# Patient Record
Sex: MALE | Race: WHITE | Employment: OTHER | URBAN - NONMETROPOLITAN AREA
[De-identification: names, ages, dates, MRNs, and addresses within clinical notes are randomized per-mention and may not be internally consistent; named-entity substitution may affect disease eponyms.]

---

## 2018-02-04 ENCOUNTER — HOSPITAL ENCOUNTER (EMERGENCY)
Age: 73
Discharge: HOME | End: 2018-02-04
Attending: EMERGENCY MEDICINE
Payer: COMMERCIAL

## 2018-02-04 VITALS
DIASTOLIC BLOOD PRESSURE: 86 MMHG | HEART RATE: 85 BPM | OXYGEN SATURATION: 95 % | TEMPERATURE: 97.9 F | SYSTOLIC BLOOD PRESSURE: 177 MMHG | RESPIRATION RATE: 18 BRPM

## 2018-02-04 DIAGNOSIS — G89.29 CHRONIC BILATERAL LOW BACK PAIN WITHOUT SCIATICA: Primary | ICD-10-CM

## 2018-02-04 DIAGNOSIS — M54.50 CHRONIC BILATERAL LOW BACK PAIN WITHOUT SCIATICA: Primary | ICD-10-CM

## 2018-02-04 PROCEDURE — 99282 EMERGENCY DEPT VISIT SF MDM: CPT

## 2018-02-04 RX ORDER — NAPROXEN 500 MG/1
500 TABLET ORAL 2 TIMES DAILY
Qty: 60 TABLET | Refills: 0 | Status: SHIPPED | OUTPATIENT
Start: 2018-02-04 | End: 2019-04-23

## 2018-02-04 ASSESSMENT — ENCOUNTER SYMPTOMS
SHORTNESS OF BREATH: 0
VOMITING: 0
ABDOMINAL PAIN: 0
FACIAL SWELLING: 0
BACK PAIN: 1
WHEEZING: 0
NAUSEA: 0
VOICE CHANGE: 0
DIARRHEA: 0
COUGH: 0

## 2018-02-04 ASSESSMENT — PAIN SCALES - GENERAL: PAINLEVEL_OUTOF10: 10

## 2018-02-04 ASSESSMENT — PAIN DESCRIPTION - LOCATION: LOCATION: BACK;LEG

## 2019-01-01 ENCOUNTER — OFFICE VISIT (OUTPATIENT)
Dept: UROLOGY | Age: 74
End: 2019-01-01
Payer: COMMERCIAL

## 2019-01-01 ENCOUNTER — HOSPITAL ENCOUNTER (OUTPATIENT)
Age: 74
Setting detail: OUTPATIENT SURGERY
Discharge: OTHER INSTITUTION | End: 2019-10-15
Attending: ANESTHESIOLOGY | Admitting: ANESTHESIOLOGY
Payer: COMMERCIAL

## 2019-01-01 ENCOUNTER — HOSPITAL ENCOUNTER (OUTPATIENT)
Age: 74
Setting detail: SPECIMEN
Discharge: HOME | End: 2019-10-25
Payer: COMMERCIAL

## 2019-01-01 ENCOUNTER — HOSPITAL ENCOUNTER (OUTPATIENT)
Dept: MRI IMAGING | Age: 74
Discharge: HOME | End: 2019-11-23
Payer: MEDICARE

## 2019-01-01 ENCOUNTER — HOSPITAL ENCOUNTER (OUTPATIENT)
Age: 74
Setting detail: SPECIMEN
Discharge: HOME | End: 2019-12-23
Payer: COMMERCIAL

## 2019-01-01 ENCOUNTER — HOSPITAL ENCOUNTER (OUTPATIENT)
Age: 74
Setting detail: SPECIMEN
Discharge: HOME | End: 2019-10-23
Payer: COMMERCIAL

## 2019-01-01 ENCOUNTER — APPOINTMENT (OUTPATIENT)
Dept: GENERAL RADIOLOGY | Age: 74
End: 2019-01-01
Attending: ANESTHESIOLOGY
Payer: COMMERCIAL

## 2019-01-01 VITALS
BODY MASS INDEX: 28.89 KG/M2 | SYSTOLIC BLOOD PRESSURE: 100 MMHG | WEIGHT: 218 LBS | HEIGHT: 73 IN | DIASTOLIC BLOOD PRESSURE: 52 MMHG

## 2019-01-01 VITALS
TEMPERATURE: 97 F | DIASTOLIC BLOOD PRESSURE: 78 MMHG | SYSTOLIC BLOOD PRESSURE: 102 MMHG | HEART RATE: 102 BPM | OXYGEN SATURATION: 98 %

## 2019-01-01 VITALS
HEIGHT: 73 IN | BODY MASS INDEX: 29.42 KG/M2 | SYSTOLIC BLOOD PRESSURE: 102 MMHG | WEIGHT: 222 LBS | DIASTOLIC BLOOD PRESSURE: 58 MMHG | TEMPERATURE: 98.6 F

## 2019-01-01 DIAGNOSIS — R29.898 BILATERAL LEG WEAKNESS: ICD-10-CM

## 2019-01-01 DIAGNOSIS — K59.00 CONSTIPATION, UNSPECIFIED CONSTIPATION TYPE: ICD-10-CM

## 2019-01-01 DIAGNOSIS — N40.1 BPH WITH OBSTRUCTION/LOWER URINARY TRACT SYMPTOMS: Primary | ICD-10-CM

## 2019-01-01 DIAGNOSIS — N31.9 NEUROGENIC BLADDER: ICD-10-CM

## 2019-01-01 DIAGNOSIS — N13.8 BPH WITH OBSTRUCTION/LOWER URINARY TRACT SYMPTOMS: Primary | ICD-10-CM

## 2019-01-01 DIAGNOSIS — R33.9 URINARY RETENTION: Primary | ICD-10-CM

## 2019-01-01 DIAGNOSIS — N13.8 BPH WITH OBSTRUCTION/LOWER URINARY TRACT SYMPTOMS: ICD-10-CM

## 2019-01-01 DIAGNOSIS — N40.1 BPH WITH OBSTRUCTION/LOWER URINARY TRACT SYMPTOMS: ICD-10-CM

## 2019-01-01 DIAGNOSIS — R41.0 CHRONIC CONFUSION: ICD-10-CM

## 2019-01-01 DIAGNOSIS — R33.9 URINARY RETENTION: ICD-10-CM

## 2019-01-01 LAB
ALBUMIN SERPL-MCNC: 2.6 G/DL (ref 3.5–5.2)
ALBUMIN/GLOBULIN RATIO: 0.7 (ref 1–2.5)
ALP BLD-CCNC: 71 U/L (ref 40–129)
ALT SERPL-CCNC: 5 U/L (ref 5–41)
ANION GAP SERPL CALCULATED.3IONS-SCNC: 11 MMOL/L (ref 9–17)
ANTI-NUCLEAR ANTIBODY (ANA): NEGATIVE
AST SERPL-CCNC: 11 U/L
BILIRUB SERPL-MCNC: 0.22 MG/DL (ref 0.3–1.2)
BUN BLDV-MCNC: 13 MG/DL (ref 8–23)
BUN/CREAT BLD: 13 (ref 9–20)
C DIFF AG + TOXIN: ABNORMAL
C DIFFICILE TOXINS, PCR: NORMAL
CALCIUM SERPL-MCNC: 8.7 MG/DL (ref 8.6–10.4)
CHLORIDE BLD-SCNC: 101 MMOL/L (ref 98–107)
CHOLESTEROL/HDL RATIO: 4
CHOLESTEROL: 127 MG/DL
CO2: 27 MMOL/L (ref 20–31)
CREAT SERPL-MCNC: 1.01 MG/DL (ref 0.7–1.2)
ESTIMATED AVERAGE GLUCOSE: 166 MG/DL
FOLATE: 10.2 NG/ML
GFR AFRICAN AMERICAN: >60 ML/MIN
GFR NON-AFRICAN AMERICAN: >60 ML/MIN
GFR SERPL CREATININE-BSD FRML MDRD: ABNORMAL ML/MIN/{1.73_M2}
GFR SERPL CREATININE-BSD FRML MDRD: ABNORMAL ML/MIN/{1.73_M2}
GLUCOSE BLD-MCNC: 81 MG/DL (ref 70–99)
HBA1C MFR BLD: 7.4 % (ref 4.8–5.9)
HCT VFR BLD CALC: 28.7 % (ref 40.7–50.3)
HDLC SERPL-MCNC: 32 MG/DL
HEMOGLOBIN: 9.1 G/DL (ref 13–17)
LDL CHOLESTEROL: 70 MG/DL (ref 0–130)
MCH RBC QN AUTO: 29.7 PG (ref 25.2–33.5)
MCHC RBC AUTO-ENTMCNC: 31.7 G/DL (ref 28.4–34.8)
MCV RBC AUTO: 93.8 FL (ref 82.6–102.9)
NRBC AUTOMATED: 0 PER 100 WBC
PDW BLD-RTO: 13.9 % (ref 11.8–14.4)
PLATELET # BLD: 415 K/UL (ref 138–453)
PMV BLD AUTO: 9.8 FL (ref 8.1–13.5)
POTASSIUM SERPL-SCNC: 3.6 MMOL/L (ref 3.7–5.3)
PROSTATE SPECIFIC ANTIGEN: 4.22 UG/L
RBC # BLD: 3.06 M/UL (ref 4.21–5.77)
SEDIMENTATION RATE, ERYTHROCYTE: 98 MM (ref 0–20)
SODIUM BLD-SCNC: 139 MMOL/L (ref 135–144)
SPECIMEN DESCRIPTION: ABNORMAL
SPECIMEN DESCRIPTION: NORMAL
TOTAL CK: 45 U/L (ref 39–308)
TOTAL PROTEIN: 6.1 G/DL (ref 6.4–8.3)
TRIGL SERPL-MCNC: 124 MG/DL
TSH SERPL DL<=0.05 MIU/L-ACNC: 1.45 MIU/L (ref 0.3–5)
VITAMIN B-12: 699 PG/ML (ref 232–1245)
VLDLC SERPL CALC-MCNC: ABNORMAL MG/DL (ref 1–30)
WBC # BLD: 6.2 K/UL (ref 3.5–11.3)

## 2019-01-01 PROCEDURE — 36415 COLL VENOUS BLD VENIPUNCTURE: CPT

## 2019-01-01 PROCEDURE — 87493 C DIFF AMPLIFIED PROBE: CPT

## 2019-01-01 PROCEDURE — 3600000002 HC SURGERY LEVEL 2 BASE: Performed by: ANESTHESIOLOGY

## 2019-01-01 PROCEDURE — 82607 VITAMIN B-12: CPT

## 2019-01-01 PROCEDURE — 2709999900 HC NON-CHARGEABLE SUPPLY: Performed by: ANESTHESIOLOGY

## 2019-01-01 PROCEDURE — 3600000012 HC SURGERY LEVEL 2 ADDTL 15MIN: Performed by: ANESTHESIOLOGY

## 2019-01-01 PROCEDURE — G0103 PSA SCREENING: HCPCS

## 2019-01-01 PROCEDURE — 83036 HEMOGLOBIN GLYCOSYLATED A1C: CPT

## 2019-01-01 PROCEDURE — 86038 ANTINUCLEAR ANTIBODIES: CPT

## 2019-01-01 PROCEDURE — 51798 US URINE CAPACITY MEASURE: CPT | Performed by: NURSE PRACTITIONER

## 2019-01-01 PROCEDURE — 85027 COMPLETE CBC AUTOMATED: CPT

## 2019-01-01 PROCEDURE — 6360000002 HC RX W HCPCS: Performed by: ANESTHESIOLOGY

## 2019-01-01 PROCEDURE — 82746 ASSAY OF FOLIC ACID SERUM: CPT

## 2019-01-01 PROCEDURE — 80053 COMPREHEN METABOLIC PANEL: CPT

## 2019-01-01 PROCEDURE — 2500000003 HC RX 250 WO HCPCS: Performed by: ANESTHESIOLOGY

## 2019-01-01 PROCEDURE — 3209999900 FLUORO FOR SURGICAL PROCEDURES

## 2019-01-01 PROCEDURE — 87449 NOS EACH ORGANISM AG IA: CPT

## 2019-01-01 PROCEDURE — 84443 ASSAY THYROID STIM HORMONE: CPT

## 2019-01-01 PROCEDURE — P9603 ONE-WAY ALLOW PRORATED MILES: HCPCS

## 2019-01-01 PROCEDURE — 72146 MRI CHEST SPINE W/O DYE: CPT

## 2019-01-01 PROCEDURE — 80061 LIPID PANEL: CPT

## 2019-01-01 PROCEDURE — 99308 SBSQ NF CARE LOW MDM 20: CPT | Performed by: NURSE PRACTITIONER

## 2019-01-01 PROCEDURE — 72141 MRI NECK SPINE W/O DYE: CPT

## 2019-01-01 PROCEDURE — P9604 ONE-WAY ALLOW PRORATED TRIP: HCPCS

## 2019-01-01 PROCEDURE — 87324 CLOSTRIDIUM AG IA: CPT

## 2019-01-01 PROCEDURE — 99204 OFFICE O/P NEW MOD 45 MIN: CPT | Performed by: NURSE PRACTITIONER

## 2019-01-01 PROCEDURE — 85651 RBC SED RATE NONAUTOMATED: CPT

## 2019-01-01 PROCEDURE — 82550 ASSAY OF CK (CPK): CPT

## 2019-01-01 RX ORDER — OXYCODONE HYDROCHLORIDE AND ACETAMINOPHEN 5; 325 MG/1; MG/1
1 TABLET ORAL
COMMUNITY
End: 2020-01-01 | Stop reason: CLARIF

## 2019-01-01 RX ORDER — BUPIVACAINE HYDROCHLORIDE 5 MG/ML
INJECTION, SOLUTION EPIDURAL; INTRACAUDAL PRN
Status: DISCONTINUED | OUTPATIENT
Start: 2019-01-01 | End: 2019-01-01 | Stop reason: ALTCHOICE

## 2019-01-01 RX ORDER — LISINOPRIL 20 MG/1
TABLET ORAL
COMMUNITY
End: 2019-01-01

## 2019-01-01 RX ORDER — SODIUM CHLORIDE 0.9 % (FLUSH) 0.9 %
10 SYRINGE (ML) INJECTION EVERY 12 HOURS SCHEDULED
Status: DISCONTINUED | OUTPATIENT
Start: 2019-01-01 | End: 2019-01-01 | Stop reason: HOSPADM

## 2019-01-01 RX ORDER — VANCOMYCIN HYDROCHLORIDE 5 G/100ML
5 INJECTION, POWDER, LYOPHILIZED, FOR SOLUTION INTRAVENOUS
COMMUNITY
Start: 2019-10-04 | End: 2020-01-01 | Stop reason: ALTCHOICE

## 2019-01-01 RX ORDER — LIDOCAINE HYDROCHLORIDE 10 MG/ML
INJECTION, SOLUTION EPIDURAL; INFILTRATION; INTRACAUDAL; PERINEURAL PRN
Status: DISCONTINUED | OUTPATIENT
Start: 2019-01-01 | End: 2019-01-01 | Stop reason: ALTCHOICE

## 2019-01-01 RX ORDER — ALBUTEROL SULFATE 90 UG/1
AEROSOL, METERED RESPIRATORY (INHALATION)
COMMUNITY
Start: 2019-04-24 | End: 2020-01-01 | Stop reason: CLARIF

## 2019-01-01 RX ORDER — POLYETHYLENE GLYCOL 3350 17 G/17G
17 POWDER, FOR SOLUTION ORAL
COMMUNITY
Start: 2019-09-07

## 2019-01-01 RX ORDER — SODIUM CHLORIDE 0.9 % (FLUSH) 0.9 %
10 SYRINGE (ML) INJECTION PRN
Status: DISCONTINUED | OUTPATIENT
Start: 2019-01-01 | End: 2019-01-01 | Stop reason: HOSPADM

## 2019-01-01 RX ORDER — ATORVASTATIN CALCIUM 20 MG/1
20 TABLET, FILM COATED ORAL
COMMUNITY
Start: 2019-04-25 | End: 2020-01-01 | Stop reason: CLARIF

## 2019-01-01 RX ORDER — LORAZEPAM 1 MG/1
1 TABLET ORAL EVERY 6 HOURS PRN
COMMUNITY
End: 2020-01-01 | Stop reason: CLARIF

## 2019-01-01 RX ORDER — SERTRALINE HYDROCHLORIDE 100 MG/1
200 TABLET, FILM COATED ORAL DAILY
COMMUNITY
Start: 2019-10-03

## 2019-01-01 RX ORDER — MAGNESIUM SULFATE 1 G/100ML
1 INJECTION INTRAVENOUS
COMMUNITY
Start: 2019-09-06 | End: 2020-01-01 | Stop reason: CLARIF

## 2019-01-01 RX ORDER — MELOXICAM 15 MG/1
TABLET ORAL
Refills: 1 | COMMUNITY
Start: 2019-08-23 | End: 2020-01-01 | Stop reason: CLARIF

## 2019-01-01 RX ORDER — POTASSIUM CHLORIDE 750 MG/1
40 CAPSULE, EXTENDED RELEASE ORAL
COMMUNITY
Start: 2019-09-06 | End: 2020-01-01 | Stop reason: CLARIF

## 2019-01-01 RX ORDER — SODIUM CHLORIDE, SODIUM LACTATE, POTASSIUM CHLORIDE, CALCIUM CHLORIDE 600; 310; 30; 20 MG/100ML; MG/100ML; MG/100ML; MG/100ML
INJECTION, SOLUTION INTRAVENOUS CONTINUOUS
Status: DISCONTINUED | OUTPATIENT
Start: 2019-01-01 | End: 2019-01-01 | Stop reason: HOSPADM

## 2019-01-01 RX ORDER — SODIUM PHOSPHATE, DIBASIC AND SODIUM PHOSPHATE, MONOBASIC 7; 19 G/133ML; G/133ML
1 ENEMA RECTAL
COMMUNITY

## 2019-01-01 RX ORDER — NICOTINE POLACRILEX 4 MG
15 LOZENGE BUCCAL PRN
COMMUNITY
Start: 2019-09-04

## 2019-01-01 RX ORDER — LACTULOSE 10 G/15ML
30 SOLUTION ORAL
COMMUNITY
Start: 2019-09-07 | End: 2020-01-01 | Stop reason: CLARIF

## 2019-01-01 RX ORDER — NICOTINE 21 MG/24HR
1 PATCH, TRANSDERMAL 24 HOURS TRANSDERMAL
COMMUNITY
Start: 2019-09-06 | End: 2020-01-01 | Stop reason: CLARIF

## 2019-01-01 RX ORDER — ONDANSETRON 2 MG/ML
4 INJECTION INTRAMUSCULAR; INTRAVENOUS
COMMUNITY
Start: 2019-09-04 | End: 2020-01-01 | Stop reason: CLARIF

## 2019-01-01 RX ORDER — INSULIN GLARGINE 100 [IU]/ML
INJECTION, SOLUTION SUBCUTANEOUS
COMMUNITY
Start: 2019-04-25 | End: 2019-01-01

## 2019-01-01 RX ORDER — METHYLPREDNISOLONE ACETATE 40 MG/ML
INJECTION, SUSPENSION INTRA-ARTICULAR; INTRALESIONAL; INTRAMUSCULAR; SOFT TISSUE PRN
Status: DISCONTINUED | OUTPATIENT
Start: 2019-01-01 | End: 2019-01-01 | Stop reason: ALTCHOICE

## 2019-01-01 ASSESSMENT — ENCOUNTER SYMPTOMS
VOMITING: 0
COUGH: 0
SHORTNESS OF BREATH: 0
ABDOMINAL PAIN: 0
EYE PAIN: 0
NAUSEA: 0
CONSTIPATION: 1
EYE REDNESS: 0
NAUSEA: 0
COLOR CHANGE: 0
EYE REDNESS: 0
BACK PAIN: 0
WHEEZING: 0
SHORTNESS OF BREATH: 0
VOMITING: 0
COLOR CHANGE: 0
EYE PAIN: 0
WHEEZING: 0
ABDOMINAL PAIN: 0
BACK PAIN: 0
CONSTIPATION: 1
COUGH: 0

## 2019-01-01 ASSESSMENT — PAIN - FUNCTIONAL ASSESSMENT: PAIN_FUNCTIONAL_ASSESSMENT: 0-10

## 2019-04-23 ENCOUNTER — HOSPITAL ENCOUNTER (EMERGENCY)
Age: 74
Discharge: ANOTHER HOSPITAL | End: 2019-04-24
Attending: EMERGENCY MEDICINE
Payer: COMMERCIAL

## 2019-04-23 DIAGNOSIS — K92.2 GASTROINTESTINAL HEMORRHAGE, UNSPECIFIED GASTROINTESTINAL HEMORRHAGE TYPE: ICD-10-CM

## 2019-04-23 DIAGNOSIS — R73.9 HYPERGLYCEMIA: ICD-10-CM

## 2019-04-23 DIAGNOSIS — I16.0 HYPERTENSIVE URGENCY: Primary | ICD-10-CM

## 2019-04-23 LAB
-: ABNORMAL
ALLEN TEST: ABNORMAL
AMORPHOUS: ABNORMAL
ANION GAP SERPL CALCULATED.3IONS-SCNC: 12 MMOL/L (ref 9–17)
BACTERIA: ABNORMAL
BETA-HYDROXYBUTYRATE: 0.15 MMOL/L (ref 0.02–0.27)
BILIRUBIN URINE: NEGATIVE
BUN BLDV-MCNC: 37 MG/DL (ref 8–23)
BUN/CREAT BLD: 30 (ref 9–20)
CALCIUM SERPL-MCNC: 8.5 MG/DL (ref 8.6–10.4)
CARBOXYHEMOGLOBIN: ABNORMAL % (ref 0–5)
CASTS UA: ABNORMAL /LPF
CHLORIDE BLD-SCNC: 96 MMOL/L (ref 98–107)
CHP ED QC CHECK: YES
CO2: 23 MMOL/L (ref 20–31)
COLOR: YELLOW
COMMENT UA: ABNORMAL
CREAT SERPL-MCNC: 1.22 MG/DL (ref 0.7–1.2)
CRYSTALS, UA: ABNORMAL /HPF
EPITHELIAL CELLS UA: ABNORMAL /HPF (ref 0–5)
FIO2: ABNORMAL
GFR AFRICAN AMERICAN: >60 ML/MIN
GFR NON-AFRICAN AMERICAN: 59 ML/MIN
GFR SERPL CREATININE-BSD FRML MDRD: ABNORMAL ML/MIN/{1.73_M2}
GFR SERPL CREATININE-BSD FRML MDRD: ABNORMAL ML/MIN/{1.73_M2}
GLUCOSE BLD-MCNC: 349 MG/DL (ref 74–100)
GLUCOSE BLD-MCNC: 379 MG/DL
GLUCOSE BLD-MCNC: 385 MG/DL (ref 70–99)
GLUCOSE URINE: ABNORMAL
HCO3 VENOUS: 22.6 MMOL/L (ref 24–30)
HCT VFR BLD CALC: 34.1 % (ref 40.7–50.3)
HEMOGLOBIN: 11.5 G/DL (ref 13–17)
KETONES, URINE: NEGATIVE
LACTIC ACID, WHOLE BLOOD: ABNORMAL MMOL/L (ref 0.7–2.1)
LACTIC ACID: 2.5 MMOL/L (ref 0.5–2.2)
LEUKOCYTE ESTERASE, URINE: NEGATIVE
MAGNESIUM: 1.9 MG/DL (ref 1.6–2.6)
MCH RBC QN AUTO: 31.5 PG (ref 25.2–33.5)
MCHC RBC AUTO-ENTMCNC: 33.7 G/DL (ref 28.4–34.8)
MCV RBC AUTO: 93.4 FL (ref 82.6–102.9)
METHEMOGLOBIN: ABNORMAL % (ref 0–1.9)
MODE: ABNORMAL
MUCUS: ABNORMAL
NEGATIVE BASE EXCESS, VEN: 2 MMOL/L (ref 0–2)
NITRITE, URINE: NEGATIVE
NOTIFICATION TIME: ABNORMAL
NOTIFICATION: ABNORMAL
NRBC AUTOMATED: 0 PER 100 WBC
O2 DEVICE/FLOW/%: ABNORMAL
O2 SAT, VEN: 81.9 % (ref 60–85)
OTHER OBSERVATIONS UA: ABNORMAL
OXYHEMOGLOBIN: ABNORMAL % (ref 95–98)
PATIENT TEMP: ABNORMAL
PCO2, VEN, TEMP ADJ: ABNORMAL MMHG (ref 39–55)
PCO2, VEN: 38.3 (ref 39–55)
PDW BLD-RTO: 13.1 % (ref 11.8–14.4)
PEEP/CPAP: ABNORMAL
PH UA: 5.5 (ref 5–9)
PH VENOUS: 7.39 (ref 7.32–7.42)
PH, VEN, TEMP ADJ: ABNORMAL (ref 7.32–7.42)
PLATELET # BLD: 218 K/UL (ref 138–453)
PMV BLD AUTO: 10.1 FL (ref 8.1–13.5)
PO2, VEN, TEMP ADJ: ABNORMAL MMHG (ref 30–50)
PO2, VEN: 46.1 (ref 30–50)
POSITIVE BASE EXCESS, VEN: ABNORMAL MMOL/L (ref 0–2)
POTASSIUM SERPL-SCNC: 4.8 MMOL/L (ref 3.7–5.3)
PROTEIN UA: ABNORMAL
PSV: ABNORMAL
PT. POSITION: ABNORMAL
RBC # BLD: 3.65 M/UL (ref 4.21–5.77)
RBC UA: ABNORMAL /HPF (ref 0–2)
RENAL EPITHELIAL, UA: ABNORMAL /HPF
RESPIRATORY RATE: ABNORMAL
SAMPLE SITE: ABNORMAL
SET RATE: ABNORMAL
SODIUM BLD-SCNC: 131 MMOL/L (ref 135–144)
SPECIFIC GRAVITY UA: 1.02 (ref 1.01–1.02)
TEXT FOR RESPIRATORY: ABNORMAL
TOTAL HB: ABNORMAL G/DL (ref 12–16)
TOTAL RATE: ABNORMAL
TRICHOMONAS: ABNORMAL
TURBIDITY: CLEAR
URINE HGB: ABNORMAL
UROBILINOGEN, URINE: NORMAL
VT: ABNORMAL
WBC # BLD: 7.9 K/UL (ref 3.5–11.3)
WBC UA: ABNORMAL /HPF (ref 0–5)
YEAST: ABNORMAL

## 2019-04-23 PROCEDURE — 96375 TX/PRO/DX INJ NEW DRUG ADDON: CPT

## 2019-04-23 PROCEDURE — 6360000002 HC RX W HCPCS: Performed by: EMERGENCY MEDICINE

## 2019-04-23 PROCEDURE — 99285 EMERGENCY DEPT VISIT HI MDM: CPT

## 2019-04-23 PROCEDURE — 82947 ASSAY GLUCOSE BLOOD QUANT: CPT

## 2019-04-23 PROCEDURE — 96361 HYDRATE IV INFUSION ADD-ON: CPT

## 2019-04-23 PROCEDURE — 36415 COLL VENOUS BLD VENIPUNCTURE: CPT

## 2019-04-23 PROCEDURE — 80048 BASIC METABOLIC PNL TOTAL CA: CPT

## 2019-04-23 PROCEDURE — 85027 COMPLETE CBC AUTOMATED: CPT

## 2019-04-23 PROCEDURE — 83735 ASSAY OF MAGNESIUM: CPT

## 2019-04-23 PROCEDURE — 81001 URINALYSIS AUTO W/SCOPE: CPT

## 2019-04-23 PROCEDURE — 2580000003 HC RX 258: Performed by: PHYSICIAN ASSISTANT

## 2019-04-23 PROCEDURE — 82010 KETONE BODYS QUAN: CPT

## 2019-04-23 PROCEDURE — 96374 THER/PROPH/DIAG INJ IV PUSH: CPT

## 2019-04-23 PROCEDURE — 83605 ASSAY OF LACTIC ACID: CPT

## 2019-04-23 PROCEDURE — 82805 BLOOD GASES W/O2 SATURATION: CPT

## 2019-04-23 PROCEDURE — 2500000003 HC RX 250 WO HCPCS: Performed by: PHYSICIAN ASSISTANT

## 2019-04-23 RX ORDER — LABETALOL HYDROCHLORIDE 5 MG/ML
10 INJECTION, SOLUTION INTRAVENOUS ONCE
Status: COMPLETED | OUTPATIENT
Start: 2019-04-23 | End: 2019-04-23

## 2019-04-23 RX ORDER — 0.9 % SODIUM CHLORIDE 0.9 %
1000 INTRAVENOUS SOLUTION INTRAVENOUS ONCE
Status: COMPLETED | OUTPATIENT
Start: 2019-04-23 | End: 2019-04-24

## 2019-04-23 RX ORDER — HYDRALAZINE HYDROCHLORIDE 20 MG/ML
5 INJECTION INTRAMUSCULAR; INTRAVENOUS EVERY 6 HOURS PRN
Status: DISCONTINUED | OUTPATIENT
Start: 2019-04-23 | End: 2019-04-24 | Stop reason: HOSPADM

## 2019-04-23 RX ADMIN — SODIUM CHLORIDE 1000 ML: 9 INJECTION, SOLUTION INTRAVENOUS at 21:52

## 2019-04-23 RX ADMIN — HYDRALAZINE HYDROCHLORIDE 5 MG: 20 INJECTION INTRAMUSCULAR; INTRAVENOUS at 23:38

## 2019-04-23 RX ADMIN — LABETALOL HYDROCHLORIDE 10 MG: 5 INJECTION, SOLUTION INTRAVENOUS at 23:04

## 2019-04-23 ASSESSMENT — ENCOUNTER SYMPTOMS
SORE THROAT: 0
DIARRHEA: 0
BLOOD IN STOOL: 0
CHEST TIGHTNESS: 0
RHINORRHEA: 0
EYE DISCHARGE: 0
BACK PAIN: 0
VOMITING: 0
EYE REDNESS: 0
SHORTNESS OF BREATH: 0
ABDOMINAL PAIN: 0
COUGH: 0
CONSTIPATION: 0
WHEEZING: 0
NAUSEA: 0

## 2019-04-24 VITALS
DIASTOLIC BLOOD PRESSURE: 82 MMHG | OXYGEN SATURATION: 96 % | HEART RATE: 90 BPM | TEMPERATURE: 98.6 F | RESPIRATION RATE: 15 BRPM | SYSTOLIC BLOOD PRESSURE: 194 MMHG

## 2019-04-24 PROCEDURE — 6370000000 HC RX 637 (ALT 250 FOR IP): Performed by: EMERGENCY MEDICINE

## 2019-04-24 RX ORDER — ACETAMINOPHEN 500 MG
1000 TABLET ORAL ONCE
Status: COMPLETED | OUTPATIENT
Start: 2019-04-24 | End: 2019-04-24

## 2019-04-24 RX ADMIN — ACETAMINOPHEN 1000 MG: 500 TABLET, FILM COATED ORAL at 00:22

## 2019-04-24 ASSESSMENT — PAIN SCALES - GENERAL: PAINLEVEL_OUTOF10: 9

## 2019-04-24 NOTE — ED NOTES
Called access, spoke to [place]. Need hospitalist at [facility] for transfer.  Waiting for call back     Layla Sanchez  04/23/19 4227

## 2019-04-24 NOTE — ED PROVIDER NOTES
[facility] ED  eMERGENCY dEPARTMENT eNCOUnter      Pt Name: Jonn Villarreal  MRN: [id number]  [place] 1945  Date of evaluation: 4/23/2019  Provider: Jacob Quijano [facility]     Chief Complaint   Patient presents with    Hyperglycemia     patient had episodes of hypoglycemia over the weekend since then his insulin was stopped except hs lantus shot.  today blood sugar has been elevated         HISTORY OF PRESENT ILLNESS   (Location/Symptom, Timing/Onset, Context/Setting,Quality, Duration, Modifying Factors, Severity)  Note limiting factors. Jonn Villarreal is a80 y.o. male who presents to the emergency department with spell secondary to hyperglycemia noted this evening. Family reports that patient has always had hyperglycemia and then an insulin-dependent diabetic. Reports that recently he's been having episodes of forgetfulness and so sometimes he takes his insulin and sometimes he does not. Reports of the weakening got as high as 600 and so his doctor was able to get it down however over the weekend had episodes of hypoglycemia so his doctor stopped his insulin. Family reports that today it was noted to be back up higher again so they came to the ER. They deny any recent fever chills denies any recent infection. Denies any new medication. They does report that they're told to come to the ER because they've had trouble managing the blood sugar. He denies any chest pain refers of breath denies any headache or dizziness. No other complaints at this time. HPI    Nursing Notes werereviewed. REVIEW OF SYSTEMS    (2-9 systems for level 4, 10 or more for level 5)     Review of Systems   Constitutional: Negative for chills, diaphoresis and fever. HENT: Negative for congestion, ear pain, rhinorrhea and sore throat. Eyes: Negative for discharge, redness and visual disturbance. Respiratory: Negative for cough, chest tightness, shortness of breath and wheezing. Cardiovascular: Negative for chest pain and palpitations. Gastrointestinal: Negative for abdominal pain, blood in stool, constipation, diarrhea, nausea and vomiting. Endocrine: Negative for polydipsia, polyphagia and polyuria. Genitourinary: Negative for decreased urine volume, difficulty urinating, dysuria, frequency and hematuria. Musculoskeletal: Negative for arthralgias, back pain and myalgias. Skin: Negative for pallor and rash. Allergic/Immunologic: Negative for food allergies and immunocompromised state. Neurological: Negative for dizziness, syncope, weakness and light-headedness. Hematological: Negative for adenopathy. Does not bruise/bleed easily. Psychiatric/Behavioral: Negative for behavioral problems and suicidal ideas. The patient is not nervous/anxious. Except as noted above the remainder of the review of systems was reviewed and negative. PAST MEDICAL HISTORY     Past Medical History:   Diagnosis Date    Diabetes mellitus ([facility] [address]     Hypertension          SURGICALHISTORY     History reviewed. No pertinent surgical history. CURRENT MEDICATIONS       Previous Medications    INSULIN ASPART PROTAMINE-INSULIN ASPART (NOVOLOG MIX 70/30 FLEXPEN) (70-30) 100 UNIT/ML INJECTION    Inject into the skin three times daily     INSULIN GLARGINE (LANTUS) 100 UNIT/ML INJECTION VIAL    Inject 60 Units into the skin nightly     LISINOPRIL (PRINIVIL;ZESTRIL) 20 MG TABLET    Take by mouth daily     METFORMIN (GLUCOPHAGE) 500 MG TABLET    Take 500 mg by mouth 2 times daily (with meals)    TRAMADOL (ULTRAM) 50 MG TABLET    Take 50 mg by mouth every 6 hours as needed for Pain    VERAPAMIL (CALAN) 40 MG TABLET    Take 40 mg by mouth 3 times daily Patient only taking at night       ALLERGIES     Patient has no known allergies. FAMILY HISTORY     History reviewed. No pertinent family history.        SOCIAL HISTORY       Social History     Socioeconomic History    Marital status:  Spouse name: None    Number of children: None    Years of education: None    Highest education level: None   Occupational History    None   Social Needs    Financial resource strain: None    Food insecurity:     Worry: None     Inability: None    Transportation needs:     Medical: None     Non-medical: None   Tobacco Use    Smoking status: Never Smoker    Smokeless tobacco: Never Used   Substance and Sexual Activity    Alcohol use: No    Drug use: No    Sexual activity: None   Lifestyle    Physical activity:     Days per week: None     Minutes per session: None    Stress: None   Relationships    Social connections:     Talks on phone: None     Gets together: None     Attends [employer] service: None     Active member of club or organization: None     Attends meetings of clubs or organizations: None     Relationship status: None    Intimate partner violence:     Fear of current or ex partner: None     Emotionally abused: None     Physically abused: None     Forced sexual activity: None   Other Topics Concern    None   Social History Narrative    None       SCREENINGS      @FLOW([id number])@      PHYSICAL EXAM    (up to 7 for level 4, 8 or more for level 5)     ED Triage Vitals   BP Temp Temp Source Pulse Resp SpO2 Height Weight   04/23/19 2146 04/23/19 2013 04/23/19 2013 04/23/19 2013 04/23/19 2013 04/23/19 2013 -- --   (!) 210/103 98.6 °F (37 °C) Tympanic 92 18 98 %         Physical Exam   Constitutional: He is oriented to person, place, and time. He appears well-developed and well-nourished. No distress. HENT:   Head: Normocephalic and atraumatic. Right Ear: External ear normal.   Left Ear: External ear normal.   Mouth/Throat: Oropharynx is clear and moist. No oropharyngeal exudate. Eyes: Pupils are equal, round, and reactive to light. Conjunctivae and EOM are normal. Right eye exhibits no discharge. Left eye exhibits no discharge. No scleral icterus. Neck: Normal range of motion.  Neck components within normal limits   BLOOD GAS, VENOUS - Abnormal; Notable for the following components:    pCO2, Tony 38.3 (*)     HCO3, Venous 22.6 (*)     All other components within normal limits   URINE RT REFLEX TO CULTURE - Abnormal; Notable for the following components:    Glucose, Ur 3+ (*)     Specific Gravity, UA 1.025 (*)     Urine Hgb 1+ (*)     Protein, UA 3+ (*)     All other components within normal limits   LACTIC ACID, PLASMA - Abnormal; Notable for the following components:    Lactic Acid 2.5 (*)     All other components within normal limits   GLUCOSE, WHOLE BLOOD - Abnormal; Notable for the following components:    POC Glucose 349 (*)     All other components within normal limits   MICROSCOPIC URINALYSIS - Abnormal; Notable for the following components:    Bacteria, UA 1+ (*)     Mucus, UA TRACE (*)     Amorphous, UA TRACE (*)     All other components within normal limits   POCT GLUCOSE - Normal   BETA-HYDROXYBUTYRATE   MAGNESIUM       All other labs were within normal range or not returned as of this dictation. EMERGENCY DEPARTMENT COURSE and DIFFERENTIAL DIAGNOSIS/MDM:   Vitals:    Vitals:    04/23/19 2152 04/23/19 2201 04/23/19 2218 04/23/19 2232   BP: (!) 202/98 (!) 225/94 (!) 200/92 (!) 223/102   Pulse:       Resp:       Temp:       TempSrc:       SpO2: 95% 97% 99% 99%         MDM  65-year-old male with history of diabetes is insulin-dependent who presents secondary to trouble with his blood sugar over the weekend. Here in the ER resident 300s. Given their he had episodes in the 600s just 48 hours ago I am concerned that he could have others have elected to light and bounces. He is noted to be hypertensive however he is asymptomatic from this. We'll gently give him some IV fluids. On reevaluation patient's resting currently is in no distress. Patient is noted to have a normal pH. However his lactate is slightly bun. Beta hydroxy. Is negative.   At this point he still is hypertensive I am going to give him some labetalol. 4/23/19  10:59 PM  Transferring care of this patient to my attending physician, , at this time for review of workup final disposition and treatment and care of this patient.      Procedures      (Please note that portions of this note were completed with a voice recognition program.  Efforts were made to edit the dictations but occasionally words are mis-transcribed.)           Cate Gaston PA-C  04/23/19 [id number]

## 2019-04-24 NOTE — ED PROVIDER NOTES
Sign out receiving  ADDENDUM  This patient was signed out to me at shift change  The pending studies are: reassesment  I have reviewed the ED record and personally evaluated this patient  BP (!) 223/102   Pulse 92   Temp 98.6 °F (37 °C) (Tympanic)   Resp 18   SpO2 99%   ED course: pt noted to have occult blood positive stool. He also has very high blood pressure and difficult to control sugars he will be xfered to [address]   1. Hypertensive urgency    2. Gastrointestinal hemorrhage, unspecified gastrointestinal hemorrhage type    3.  Hyperglycemia                    Gabino Georges MD  04/23/19 [id number]

## 2019-08-29 ENCOUNTER — HOSPITAL ENCOUNTER (EMERGENCY)
Age: 74
Discharge: HOME | End: 2019-08-29
Attending: EMERGENCY MEDICINE
Payer: COMMERCIAL

## 2019-08-29 ENCOUNTER — APPOINTMENT (OUTPATIENT)
Dept: CT IMAGING | Age: 74
End: 2019-08-29
Payer: COMMERCIAL

## 2019-08-29 VITALS
HEART RATE: 86 BPM | HEIGHT: 73 IN | SYSTOLIC BLOOD PRESSURE: 199 MMHG | RESPIRATION RATE: 18 BRPM | TEMPERATURE: 98.6 F | OXYGEN SATURATION: 99 % | DIASTOLIC BLOOD PRESSURE: 94 MMHG

## 2019-08-29 DIAGNOSIS — M54.50 ACUTE BILATERAL LOW BACK PAIN WITHOUT SCIATICA: Primary | ICD-10-CM

## 2019-08-29 LAB
-: ABNORMAL
ABSOLUTE EOS #: 0.03 K/UL (ref 0–0.44)
ABSOLUTE IMMATURE GRANULOCYTE: 0.03 K/UL (ref 0–0.3)
ABSOLUTE LYMPH #: 1.02 K/UL (ref 1.1–3.7)
ABSOLUTE MONO #: 0.66 K/UL (ref 0.1–1.2)
ALBUMIN SERPL-MCNC: 3.2 G/DL (ref 3.5–5.2)
ALBUMIN/GLOBULIN RATIO: 1.1 (ref 1–2.5)
ALP BLD-CCNC: 59 U/L (ref 40–129)
ALT SERPL-CCNC: 22 U/L (ref 5–41)
AMORPHOUS: ABNORMAL
ANION GAP SERPL CALCULATED.3IONS-SCNC: 14 MMOL/L (ref 9–17)
AST SERPL-CCNC: 19 U/L
BACTERIA: ABNORMAL
BASOPHILS # BLD: 0 % (ref 0–2)
BASOPHILS ABSOLUTE: <0.03 K/UL (ref 0–0.2)
BILIRUB SERPL-MCNC: 0.68 MG/DL (ref 0.3–1.2)
BILIRUBIN URINE: NEGATIVE
BUN BLDV-MCNC: 24 MG/DL (ref 8–23)
BUN/CREAT BLD: 21 (ref 9–20)
C-REACTIVE PROTEIN: 0.6 MG/L (ref 0–5)
CALCIUM SERPL-MCNC: 9.2 MG/DL (ref 8.6–10.4)
CASTS UA: ABNORMAL /LPF
CHLORIDE BLD-SCNC: 102 MMOL/L (ref 98–107)
CO2: 23 MMOL/L (ref 20–31)
COLOR: YELLOW
COMMENT UA: ABNORMAL
CREAT SERPL-MCNC: 1.13 MG/DL (ref 0.7–1.2)
CRYSTALS, UA: ABNORMAL /HPF
DIFFERENTIAL TYPE: ABNORMAL
EOSINOPHILS RELATIVE PERCENT: 0 % (ref 1–4)
EPITHELIAL CELLS UA: ABNORMAL /HPF (ref 0–5)
GFR AFRICAN AMERICAN: >60 ML/MIN
GFR NON-AFRICAN AMERICAN: >60 ML/MIN
GFR SERPL CREATININE-BSD FRML MDRD: ABNORMAL ML/MIN/{1.73_M2}
GFR SERPL CREATININE-BSD FRML MDRD: ABNORMAL ML/MIN/{1.73_M2}
GLUCOSE BLD-MCNC: 128 MG/DL (ref 70–99)
GLUCOSE URINE: NEGATIVE
HCT VFR BLD CALC: 35.9 % (ref 40.7–50.3)
HEMOGLOBIN: 12.6 G/DL (ref 13–17)
IMMATURE GRANULOCYTES: 0 %
KETONES, URINE: ABNORMAL
LEUKOCYTE ESTERASE, URINE: NEGATIVE
LYMPHOCYTES # BLD: 11 % (ref 24–43)
MCH RBC QN AUTO: 32.2 PG (ref 25.2–33.5)
MCHC RBC AUTO-ENTMCNC: 35.1 G/DL (ref 28.4–34.8)
MCV RBC AUTO: 91.8 FL (ref 82.6–102.9)
MONOCYTES # BLD: 7 % (ref 3–12)
MUCUS: ABNORMAL
NITRITE, URINE: NEGATIVE
NRBC AUTOMATED: 0 PER 100 WBC
OTHER OBSERVATIONS UA: ABNORMAL
PDW BLD-RTO: 12.7 % (ref 11.8–14.4)
PH UA: 7.5 (ref 5–9)
PLATELET # BLD: 273 K/UL (ref 138–453)
PLATELET ESTIMATE: ABNORMAL
PMV BLD AUTO: 9.3 FL (ref 8.1–13.5)
POTASSIUM SERPL-SCNC: 3.9 MMOL/L (ref 3.7–5.3)
PROTEIN UA: ABNORMAL
RBC # BLD: 3.91 M/UL (ref 4.21–5.77)
RBC # BLD: ABNORMAL 10*6/UL
RBC UA: ABNORMAL /HPF (ref 0–2)
RENAL EPITHELIAL, UA: ABNORMAL /HPF
SEDIMENTATION RATE, ERYTHROCYTE: 34 MM (ref 0–20)
SEG NEUTROPHILS: 82 % (ref 36–65)
SEGMENTED NEUTROPHILS ABSOLUTE COUNT: 7.17 K/UL (ref 1.5–8.1)
SODIUM BLD-SCNC: 139 MMOL/L (ref 135–144)
SPECIFIC GRAVITY UA: 1.02 (ref 1.01–1.02)
TOTAL PROTEIN: 6.2 G/DL (ref 6.4–8.3)
TRICHOMONAS: ABNORMAL
TURBIDITY: ABNORMAL
URINE HGB: ABNORMAL
UROBILINOGEN, URINE: NORMAL
WBC # BLD: 8.9 K/UL (ref 3.5–11.3)
WBC # BLD: ABNORMAL 10*3/UL
WBC UA: ABNORMAL /HPF (ref 0–5)
YEAST: ABNORMAL

## 2019-08-29 PROCEDURE — 74174 CTA ABD&PLVS W/CONTRAST: CPT

## 2019-08-29 PROCEDURE — 6360000004 HC RX CONTRAST MEDICATION: Performed by: EMERGENCY MEDICINE

## 2019-08-29 PROCEDURE — 99283 EMERGENCY DEPT VISIT LOW MDM: CPT

## 2019-08-29 PROCEDURE — 80053 COMPREHEN METABOLIC PANEL: CPT

## 2019-08-29 PROCEDURE — 85651 RBC SED RATE NONAUTOMATED: CPT

## 2019-08-29 PROCEDURE — 86140 C-REACTIVE PROTEIN: CPT

## 2019-08-29 PROCEDURE — 96374 THER/PROPH/DIAG INJ IV PUSH: CPT

## 2019-08-29 PROCEDURE — 85025 COMPLETE CBC W/AUTO DIFF WBC: CPT

## 2019-08-29 PROCEDURE — 6360000002 HC RX W HCPCS: Performed by: EMERGENCY MEDICINE

## 2019-08-29 PROCEDURE — 81001 URINALYSIS AUTO W/SCOPE: CPT

## 2019-08-29 PROCEDURE — 2580000003 HC RX 258: Performed by: EMERGENCY MEDICINE

## 2019-08-29 PROCEDURE — 36415 COLL VENOUS BLD VENIPUNCTURE: CPT

## 2019-08-29 RX ORDER — MORPHINE SULFATE 10 MG/ML
8 INJECTION, SOLUTION INTRAMUSCULAR; INTRAVENOUS ONCE
Status: COMPLETED | OUTPATIENT
Start: 2019-08-29 | End: 2019-08-29

## 2019-08-29 RX ORDER — 0.9 % SODIUM CHLORIDE 0.9 %
1000 INTRAVENOUS SOLUTION INTRAVENOUS ONCE
Status: COMPLETED | OUTPATIENT
Start: 2019-08-29 | End: 2019-08-29

## 2019-08-29 RX ORDER — HYDROCODONE BITARTRATE AND ACETAMINOPHEN 5; 325 MG/1; MG/1
1 TABLET ORAL EVERY 6 HOURS PRN
Qty: 12 TABLET | Refills: 0 | Status: SHIPPED | OUTPATIENT
Start: 2019-08-29 | End: 2019-09-01

## 2019-08-29 RX ADMIN — SODIUM CHLORIDE 1000 ML: 9 INJECTION, SOLUTION INTRAVENOUS at 15:28

## 2019-08-29 RX ADMIN — IOPAMIDOL 75 ML: 755 INJECTION, SOLUTION INTRAVENOUS at 16:23

## 2019-08-29 RX ADMIN — MORPHINE SULFATE 8 MG: 10 INJECTION INTRAVENOUS at 15:22

## 2019-08-29 ASSESSMENT — PAIN DESCRIPTION - FREQUENCY: FREQUENCY: CONTINUOUS

## 2019-08-29 ASSESSMENT — PAIN DESCRIPTION - PAIN TYPE
TYPE: ACUTE PAIN

## 2019-08-29 ASSESSMENT — PAIN DESCRIPTION - LOCATION
LOCATION: BACK

## 2019-08-29 ASSESSMENT — PAIN DESCRIPTION - ORIENTATION
ORIENTATION: LEFT;LOWER
ORIENTATION: LEFT;LOWER

## 2019-08-29 ASSESSMENT — PAIN SCALES - GENERAL
PAINLEVEL_OUTOF10: 7
PAINLEVEL_OUTOF10: 10
PAINLEVEL_OUTOF10: 7
PAINLEVEL_OUTOF10: 9

## 2019-08-29 NOTE — ED NOTES
Bed: 05  Expected date:   Expected time:   Means of arrival:   Comments:  Medic 1 back pain     Ethan Adan RN  08/29/19 7219

## 2019-09-04 ENCOUNTER — HOSPITAL ENCOUNTER (OUTPATIENT)
Age: 74
Setting detail: OBSERVATION
Discharge: HOME | End: 2019-09-05
Attending: INTERNAL MEDICINE | Admitting: INTERNAL MEDICINE
Payer: COMMERCIAL

## 2019-09-04 DIAGNOSIS — R26.2 INABILITY TO WALK: ICD-10-CM

## 2019-09-04 DIAGNOSIS — M54.9 INTRACTABLE BACK PAIN: Primary | ICD-10-CM

## 2019-09-04 LAB
-: ABNORMAL
ABSOLUTE EOS #: 0.22 K/UL (ref 0–0.44)
ABSOLUTE IMMATURE GRANULOCYTE: 0.03 K/UL (ref 0–0.3)
ABSOLUTE LYMPH #: 1 K/UL (ref 1.1–3.7)
ABSOLUTE MONO #: 0.8 K/UL (ref 0.1–1.2)
ALBUMIN SERPL-MCNC: 2.9 G/DL (ref 3.5–5.2)
ALBUMIN/GLOBULIN RATIO: 0.9 (ref 1–2.5)
ALP BLD-CCNC: 79 U/L (ref 40–129)
ALT SERPL-CCNC: 20 U/L (ref 5–41)
AMORPHOUS: ABNORMAL
ANION GAP SERPL CALCULATED.3IONS-SCNC: 12 MMOL/L (ref 9–17)
AST SERPL-CCNC: 16 U/L
BACTERIA: ABNORMAL
BASOPHILS # BLD: 0 % (ref 0–2)
BASOPHILS ABSOLUTE: <0.03 K/UL (ref 0–0.2)
BILIRUB SERPL-MCNC: 0.29 MG/DL (ref 0.3–1.2)
BILIRUBIN URINE: NEGATIVE
BUN BLDV-MCNC: 30 MG/DL (ref 8–23)
BUN/CREAT BLD: 25 (ref 9–20)
CALCIUM SERPL-MCNC: 8.8 MG/DL (ref 8.6–10.4)
CASTS UA: ABNORMAL /LPF
CHLORIDE BLD-SCNC: 102 MMOL/L (ref 98–107)
CO2: 28 MMOL/L (ref 20–31)
COLOR: YELLOW
COMMENT UA: ABNORMAL
CREAT SERPL-MCNC: 1.22 MG/DL (ref 0.7–1.2)
CRYSTALS, UA: ABNORMAL /HPF
DIFFERENTIAL TYPE: ABNORMAL
EOSINOPHILS RELATIVE PERCENT: 3 % (ref 1–4)
EPITHELIAL CELLS UA: ABNORMAL /HPF (ref 0–5)
GFR AFRICAN AMERICAN: >60 ML/MIN
GFR NON-AFRICAN AMERICAN: 58 ML/MIN
GFR SERPL CREATININE-BSD FRML MDRD: ABNORMAL ML/MIN/{1.73_M2}
GFR SERPL CREATININE-BSD FRML MDRD: ABNORMAL ML/MIN/{1.73_M2}
GLUCOSE BLD-MCNC: 242 MG/DL (ref 74–100)
GLUCOSE BLD-MCNC: 278 MG/DL (ref 70–99)
GLUCOSE URINE: ABNORMAL
HCT VFR BLD CALC: 33.8 % (ref 40.7–50.3)
HEMOGLOBIN: 11.1 G/DL (ref 13–17)
IMMATURE GRANULOCYTES: 0 %
KETONES, URINE: NEGATIVE
LEUKOCYTE ESTERASE, URINE: ABNORMAL
LIPASE: 27 U/L (ref 13–60)
LYMPHOCYTES # BLD: 11 % (ref 24–43)
MCH RBC QN AUTO: 31.2 PG (ref 25.2–33.5)
MCHC RBC AUTO-ENTMCNC: 32.8 G/DL (ref 28.4–34.8)
MCV RBC AUTO: 94.9 FL (ref 82.6–102.9)
MONOCYTES # BLD: 9 % (ref 3–12)
MUCUS: ABNORMAL
NITRITE, URINE: NEGATIVE
NRBC AUTOMATED: 0 PER 100 WBC
OTHER OBSERVATIONS UA: ABNORMAL
PDW BLD-RTO: 12.7 % (ref 11.8–14.4)
PH UA: 8 (ref 5–9)
PLATELET # BLD: 268 K/UL (ref 138–453)
PLATELET ESTIMATE: ABNORMAL
PMV BLD AUTO: 9.9 FL (ref 8.1–13.5)
POTASSIUM SERPL-SCNC: 3.9 MMOL/L (ref 3.7–5.3)
PROTEIN UA: ABNORMAL
RBC # BLD: 3.56 M/UL (ref 4.21–5.77)
RBC # BLD: ABNORMAL 10*6/UL
RBC UA: ABNORMAL /HPF (ref 0–2)
RENAL EPITHELIAL, UA: ABNORMAL /HPF
SEG NEUTROPHILS: 77 % (ref 36–65)
SEGMENTED NEUTROPHILS ABSOLUTE COUNT: 6.7 K/UL (ref 1.5–8.1)
SODIUM BLD-SCNC: 142 MMOL/L (ref 135–144)
SPECIFIC GRAVITY UA: 1.02 (ref 1.01–1.02)
TOTAL CK: 111 U/L (ref 39–308)
TOTAL PROTEIN: 6 G/DL (ref 6.4–8.3)
TRICHOMONAS: ABNORMAL
TURBIDITY: CLEAR
URINE HGB: ABNORMAL
UROBILINOGEN, URINE: NORMAL
WBC # BLD: 8.8 K/UL (ref 3.5–11.3)
WBC # BLD: ABNORMAL 10*3/UL
WBC UA: ABNORMAL /HPF (ref 0–5)
YEAST: ABNORMAL

## 2019-09-04 PROCEDURE — 2580000003 HC RX 258: Performed by: INTERNAL MEDICINE

## 2019-09-04 PROCEDURE — 6370000000 HC RX 637 (ALT 250 FOR IP): Performed by: INTERNAL MEDICINE

## 2019-09-04 PROCEDURE — 96374 THER/PROPH/DIAG INJ IV PUSH: CPT

## 2019-09-04 PROCEDURE — G0378 HOSPITAL OBSERVATION PER HR: HCPCS

## 2019-09-04 PROCEDURE — 81001 URINALYSIS AUTO W/SCOPE: CPT

## 2019-09-04 PROCEDURE — 85025 COMPLETE CBC W/AUTO DIFF WBC: CPT

## 2019-09-04 PROCEDURE — 36415 COLL VENOUS BLD VENIPUNCTURE: CPT

## 2019-09-04 PROCEDURE — 99284 EMERGENCY DEPT VISIT MOD MDM: CPT

## 2019-09-04 PROCEDURE — 96375 TX/PRO/DX INJ NEW DRUG ADDON: CPT

## 2019-09-04 PROCEDURE — 82947 ASSAY GLUCOSE BLOOD QUANT: CPT

## 2019-09-04 PROCEDURE — 83690 ASSAY OF LIPASE: CPT

## 2019-09-04 PROCEDURE — 6360000002 HC RX W HCPCS: Performed by: PHYSICIAN ASSISTANT

## 2019-09-04 PROCEDURE — 80053 COMPREHEN METABOLIC PANEL: CPT

## 2019-09-04 PROCEDURE — 82550 ASSAY OF CK (CPK): CPT

## 2019-09-04 RX ORDER — GABAPENTIN 100 MG/1
200 CAPSULE ORAL 3 TIMES DAILY
Status: DISCONTINUED | OUTPATIENT
Start: 2019-09-04 | End: 2019-09-06 | Stop reason: HOSPADM

## 2019-09-04 RX ORDER — ONDANSETRON 2 MG/ML
4 INJECTION INTRAMUSCULAR; INTRAVENOUS EVERY 6 HOURS PRN
Status: DISCONTINUED | OUTPATIENT
Start: 2019-09-04 | End: 2019-09-06 | Stop reason: HOSPADM

## 2019-09-04 RX ORDER — LISINOPRIL AND HYDROCHLOROTHIAZIDE 12.5; 1 MG/1; MG/1
1 TABLET ORAL DAILY
COMMUNITY
End: 2020-01-01

## 2019-09-04 RX ORDER — ACETAMINOPHEN 325 MG/1
650 TABLET ORAL EVERY 4 HOURS PRN
Status: DISCONTINUED | OUTPATIENT
Start: 2019-09-04 | End: 2019-09-06 | Stop reason: HOSPADM

## 2019-09-04 RX ORDER — DEXTROSE MONOHYDRATE 25 G/50ML
12.5 INJECTION, SOLUTION INTRAVENOUS PRN
Status: DISCONTINUED | OUTPATIENT
Start: 2019-09-04 | End: 2019-09-06 | Stop reason: HOSPADM

## 2019-09-04 RX ORDER — SODIUM CHLORIDE 0.9 % (FLUSH) 0.9 %
10 SYRINGE (ML) INJECTION PRN
Status: DISCONTINUED | OUTPATIENT
Start: 2019-09-04 | End: 2019-09-06 | Stop reason: HOSPADM

## 2019-09-04 RX ORDER — DOCUSATE SODIUM 100 MG/1
100 CAPSULE, LIQUID FILLED ORAL DAILY
COMMUNITY

## 2019-09-04 RX ORDER — LISINOPRIL AND HYDROCHLOROTHIAZIDE 12.5; 1 MG/1; MG/1
1 TABLET ORAL DAILY
Status: DISCONTINUED | OUTPATIENT
Start: 2019-09-05 | End: 2019-09-06 | Stop reason: HOSPADM

## 2019-09-04 RX ORDER — HYDROCODONE BITARTRATE AND ACETAMINOPHEN 10; 325 MG/1; MG/1
1 TABLET ORAL EVERY 6 HOURS PRN
Status: DISCONTINUED | OUTPATIENT
Start: 2019-09-04 | End: 2019-09-06 | Stop reason: HOSPADM

## 2019-09-04 RX ORDER — VERAPAMIL HYDROCHLORIDE 40 MG/1
40 TABLET ORAL 3 TIMES DAILY
Status: DISCONTINUED | OUTPATIENT
Start: 2019-09-04 | End: 2019-09-05

## 2019-09-04 RX ORDER — MORPHINE SULFATE 2 MG/ML
2 INJECTION, SOLUTION INTRAMUSCULAR; INTRAVENOUS ONCE
Status: COMPLETED | OUTPATIENT
Start: 2019-09-04 | End: 2019-09-04

## 2019-09-04 RX ORDER — ACETAMINOPHEN 500 MG
1000 TABLET ORAL 3 TIMES DAILY PRN
COMMUNITY

## 2019-09-04 RX ORDER — NICOTINE POLACRILEX 4 MG
15 LOZENGE BUCCAL PRN
Status: DISCONTINUED | OUTPATIENT
Start: 2019-09-04 | End: 2019-09-06 | Stop reason: HOSPADM

## 2019-09-04 RX ORDER — SODIUM CHLORIDE 0.9 % (FLUSH) 0.9 %
10 SYRINGE (ML) INJECTION EVERY 12 HOURS SCHEDULED
Status: DISCONTINUED | OUTPATIENT
Start: 2019-09-04 | End: 2019-09-06 | Stop reason: HOSPADM

## 2019-09-04 RX ORDER — DEXTROSE MONOHYDRATE 50 MG/ML
100 INJECTION, SOLUTION INTRAVENOUS PRN
Status: DISCONTINUED | OUTPATIENT
Start: 2019-09-04 | End: 2019-09-06 | Stop reason: HOSPADM

## 2019-09-04 RX ORDER — HYDROCODONE BITARTRATE AND ACETAMINOPHEN 5; 325 MG/1; MG/1
1 TABLET ORAL EVERY 8 HOURS PRN
COMMUNITY
End: 2019-09-18

## 2019-09-04 RX ORDER — INSULIN GLARGINE 100 [IU]/ML
30 INJECTION, SOLUTION SUBCUTANEOUS 2 TIMES DAILY
Status: DISCONTINUED | OUTPATIENT
Start: 2019-09-04 | End: 2019-09-06 | Stop reason: HOSPADM

## 2019-09-04 RX ORDER — PROMETHAZINE HYDROCHLORIDE 25 MG/ML
6.25 INJECTION, SOLUTION INTRAMUSCULAR; INTRAVENOUS ONCE
Status: COMPLETED | OUTPATIENT
Start: 2019-09-04 | End: 2019-09-04

## 2019-09-04 RX ADMIN — GABAPENTIN 200 MG: 100 CAPSULE ORAL at 21:47

## 2019-09-04 RX ADMIN — Medication 10 ML: at 21:50

## 2019-09-04 RX ADMIN — VERAPAMIL HYDROCHLORIDE 40 MG: 40 TABLET ORAL at 21:46

## 2019-09-04 RX ADMIN — MORPHINE SULFATE 2 MG: 2 INJECTION, SOLUTION INTRAMUSCULAR; INTRAVENOUS at 18:06

## 2019-09-04 RX ADMIN — HYDROCODONE BITARTRATE AND ACETAMINOPHEN 1 TABLET: 10; 325 TABLET ORAL at 21:47

## 2019-09-04 RX ADMIN — PROMETHAZINE HYDROCHLORIDE 6.25 MG: 25 INJECTION, SOLUTION INTRAMUSCULAR; INTRAVENOUS at 18:01

## 2019-09-04 RX ADMIN — INSULIN GLARGINE 30 UNITS: 100 INJECTION, SOLUTION SUBCUTANEOUS at 21:50

## 2019-09-04 ASSESSMENT — PAIN DESCRIPTION - DESCRIPTORS: DESCRIPTORS: ACHING

## 2019-09-04 ASSESSMENT — PAIN SCALES - GENERAL
PAINLEVEL_OUTOF10: 8
PAINLEVEL_OUTOF10: 6
PAINLEVEL_OUTOF10: 8
PAINLEVEL_OUTOF10: 10
PAINLEVEL_OUTOF10: 0

## 2019-09-04 ASSESSMENT — PAIN DESCRIPTION - LOCATION: LOCATION: BACK

## 2019-09-04 ASSESSMENT — PAIN DESCRIPTION - PAIN TYPE: TYPE: CHRONIC PAIN

## 2019-09-04 ASSESSMENT — PAIN DESCRIPTION - ORIENTATION: ORIENTATION: LOWER

## 2019-09-04 NOTE — ED PROVIDER NOTES
chest pain shortness of breath bowel or bladder incontinence or extremity weakness. Triage notes and Nursing notes were reviewed by myself. Any discrepancies are addressed above. PAST MEDICAL HISTORY     Past Medical History:   Diagnosis Date    Cerebral artery occlusion with cerebral infarction ([facility] [address]     Diabetes mellitus ([facility]     Hypertension        SURGICAL HISTORY     History reviewed. No pertinent surgical history. CURRENT MEDICATIONS       Previous Medications    HYDROCODONE-ACETAMINOPHEN (NORCO) 5-325 MG PER TABLET    Take 1 tablet by mouth every 8 hours as needed for Pain. INSULIN ASPART PROTAMINE-INSULIN ASPART (NOVOLOG MIX 70/30 FLEXPEN) (70-30) 100 UNIT/ML INJECTION    Inject into the skin three times daily     INSULIN GLARGINE (LANTUS) 100 UNIT/ML INJECTION VIAL    Inject 30 Units into the skin 2 times daily     LISINOPRIL (PRINIVIL;ZESTRIL) 20 MG TABLET    Take by mouth daily     METFORMIN (GLUCOPHAGE) 500 MG TABLET    Take 500 mg by mouth 2 times daily (with meals)    TRAMADOL (ULTRAM) 50 MG TABLET    Take 50 mg by mouth every 6 hours as needed for Pain    VERAPAMIL (CALAN) 40 MG TABLET    Take 40 mg by mouth 3 times daily Patient only taking at night       ALLERGIES     Patient has no known allergies. FAMILY HISTORY     History reviewed. No pertinent family history.      SOCIAL HISTORY       Social History     Socioeconomic History    Marital status:      Spouse name: None    Number of children: None    Years of education: None    Highest education level: None   Occupational History    None   Social Needs    Financial resource strain: None    Food insecurity:     Worry: None     Inability: None    Transportation needs:     Medical: None     Non-medical: None   Tobacco Use    Smoking status: Never Smoker    Smokeless tobacco: Never Used   Substance and Sexual Activity    Alcohol use: No    Drug use: No    Sexual activity: None   Lifestyle    Physical activity: dissection, stones, occult fracture       FINDINGS:       CTA ABDOMEN:       Vasculature: The abdominal aorta is normal in course and contour. Scattered   calcified and noncalcified plaque.  The origin of the celiac axis is normal.   The splenic and common hepatic arteries are unremarkable. The superior   mesenteric artery origin is patent. Calcified plaquing of bilateral renal   artery origins, left greater than right.       Organs: Fatty liver without focal lesion.  No ductal dilatation.  No   arterially enhancing lesions.  Mild gallbladder distention.  Please correlate   for symptoms of cholecystitis.  Spleen is normal aside from a few calcified   granulomas.  Pancreas and adrenal glands are normal.  Symmetric enhancement   of kidneys with renal cortical volume loss.  Exophytic right renal cyst   measuring 7.3 cm transversely requires no imaging follow-up due to simple   attenuation.       GI/Bowel: Scattered stool throughout the colon.  No dilated loops of bowel. Sigmoid diverticulosis without evidence of diverticulitis.       Retroperitoneum/Peritoneum:  No enlarged retroperitoneal lymph nodes.  No   free fluid.  No free air.           CTA PELVIS:       Vasculature: The common iliac arteries, the internal iliac arteries, and the   external iliac arteries show normal course and contour. Calcified and   noncalcified plaque.       Pelvis: Enlarged prostate.  Bladder is distended.  Seminal vesicles are   symmetric.       Soft tissues/Bones: Fat-containing umbilical hernia.  The orifice measures   2.7 cm.  No associated fat stranding.  Gluteal atrophy.  Dependent edema. Degenerative changes with discogenic endplate change at X4-C5.  Endplate   osteophyte formation.  Facets and spinous processes appear intact. Degenerative changes of bilateral hips.      No acute vascular disease.       Stool throughout the colon.       Diverticulosis without evidence of diverticulitis.       Mild gallbladder distention can be

## 2019-09-04 NOTE — ED NOTES
Patient assisted with complete linen change. Patient attempted to void in urinal and was unable to collect. He tolerated rolling side to side well. Call light remains within reach and family at bedside.       Benson Pickens RN  09/04/19 0384

## 2019-09-05 ENCOUNTER — APPOINTMENT (OUTPATIENT)
Dept: MRI IMAGING | Age: 74
End: 2019-09-05
Payer: COMMERCIAL

## 2019-09-05 ENCOUNTER — APPOINTMENT (OUTPATIENT)
Dept: GENERAL RADIOLOGY | Age: 74
End: 2019-09-05
Payer: COMMERCIAL

## 2019-09-05 VITALS
SYSTOLIC BLOOD PRESSURE: 148 MMHG | DIASTOLIC BLOOD PRESSURE: 55 MMHG | BODY MASS INDEX: 31.56 KG/M2 | TEMPERATURE: 97.9 F | HEIGHT: 73 IN | WEIGHT: 238.1 LBS | OXYGEN SATURATION: 97 % | HEART RATE: 88 BPM | RESPIRATION RATE: 18 BRPM

## 2019-09-05 PROBLEM — M54.16 LUMBAR RADICULOPATHY: Status: ACTIVE | Noted: 2019-09-05

## 2019-09-05 LAB
GLUCOSE BLD-MCNC: 175 MG/DL (ref 74–100)
GLUCOSE BLD-MCNC: 209 MG/DL (ref 74–100)
GLUCOSE BLD-MCNC: 227 MG/DL (ref 74–100)
GLUCOSE BLD-MCNC: 98 MG/DL (ref 74–100)

## 2019-09-05 PROCEDURE — G0378 HOSPITAL OBSERVATION PER HR: HCPCS

## 2019-09-05 PROCEDURE — 6370000000 HC RX 637 (ALT 250 FOR IP): Performed by: INTERNAL MEDICINE

## 2019-09-05 PROCEDURE — 97110 THERAPEUTIC EXERCISES: CPT

## 2019-09-05 PROCEDURE — 97530 THERAPEUTIC ACTIVITIES: CPT

## 2019-09-05 PROCEDURE — 82947 ASSAY GLUCOSE BLOOD QUANT: CPT

## 2019-09-05 PROCEDURE — 6360000002 HC RX W HCPCS: Performed by: PHYSICIAN ASSISTANT

## 2019-09-05 PROCEDURE — 97163 PT EVAL HIGH COMPLEX 45 MIN: CPT

## 2019-09-05 PROCEDURE — 6370000000 HC RX 637 (ALT 250 FOR IP): Performed by: PHYSICIAN ASSISTANT

## 2019-09-05 PROCEDURE — 2580000003 HC RX 258: Performed by: INTERNAL MEDICINE

## 2019-09-05 PROCEDURE — 72100 X-RAY EXAM L-S SPINE 2/3 VWS: CPT

## 2019-09-05 PROCEDURE — 96372 THER/PROPH/DIAG INJ SC/IM: CPT

## 2019-09-05 RX ORDER — MORPHINE SULFATE 2 MG/ML
1 INJECTION, SOLUTION INTRAMUSCULAR; INTRAVENOUS EVERY 4 HOURS PRN
Status: DISCONTINUED | OUTPATIENT
Start: 2019-09-05 | End: 2019-09-06 | Stop reason: HOSPADM

## 2019-09-05 RX ORDER — DEXTROSE MONOHYDRATE 25 G/50ML
12.5 INJECTION, SOLUTION INTRAVENOUS PRN
Status: DISCONTINUED | OUTPATIENT
Start: 2019-09-05 | End: 2019-09-06 | Stop reason: HOSPADM

## 2019-09-05 RX ORDER — VERAPAMIL HYDROCHLORIDE 240 MG/1
240 TABLET, FILM COATED, EXTENDED RELEASE ORAL NIGHTLY
Status: ON HOLD | COMMUNITY
End: 2020-01-01 | Stop reason: HOSPADM

## 2019-09-05 RX ORDER — NICOTINE POLACRILEX 4 MG
15 LOZENGE BUCCAL PRN
Status: DISCONTINUED | OUTPATIENT
Start: 2019-09-05 | End: 2019-09-06 | Stop reason: HOSPADM

## 2019-09-05 RX ORDER — VERAPAMIL HYDROCHLORIDE 240 MG/1
240 TABLET, FILM COATED, EXTENDED RELEASE ORAL NIGHTLY
Status: DISCONTINUED | OUTPATIENT
Start: 2019-09-05 | End: 2019-09-06 | Stop reason: HOSPADM

## 2019-09-05 RX ORDER — DEXTROSE MONOHYDRATE 50 MG/ML
100 INJECTION, SOLUTION INTRAVENOUS PRN
Status: DISCONTINUED | OUTPATIENT
Start: 2019-09-05 | End: 2019-09-06 | Stop reason: HOSPADM

## 2019-09-05 RX ADMIN — VERAPAMIL HYDROCHLORIDE 240 MG: 240 TABLET, FILM COATED, EXTENDED RELEASE ORAL at 21:05

## 2019-09-05 RX ADMIN — ENOXAPARIN SODIUM 30 MG: 30 INJECTION SUBCUTANEOUS at 21:05

## 2019-09-05 RX ADMIN — GABAPENTIN 200 MG: 100 CAPSULE ORAL at 21:05

## 2019-09-05 RX ADMIN — METFORMIN HYDROCHLORIDE 500 MG: 500 TABLET ORAL at 07:59

## 2019-09-05 RX ADMIN — GABAPENTIN 200 MG: 100 CAPSULE ORAL at 08:33

## 2019-09-05 RX ADMIN — HYDROCODONE BITARTRATE AND ACETAMINOPHEN 1 TABLET: 10; 325 TABLET ORAL at 07:59

## 2019-09-05 RX ADMIN — INSULIN LISPRO 20 UNITS: 100 INJECTION, SUSPENSION SUBCUTANEOUS at 08:36

## 2019-09-05 RX ADMIN — METFORMIN HYDROCHLORIDE 500 MG: 500 TABLET ORAL at 17:15

## 2019-09-05 RX ADMIN — INSULIN LISPRO 20 UNITS: 100 INJECTION, SUSPENSION SUBCUTANEOUS at 17:15

## 2019-09-05 RX ADMIN — INSULIN GLARGINE 30 UNITS: 100 INJECTION, SOLUTION SUBCUTANEOUS at 21:03

## 2019-09-05 RX ADMIN — ENOXAPARIN SODIUM 30 MG: 30 INJECTION SUBCUTANEOUS at 08:33

## 2019-09-05 RX ADMIN — VERAPAMIL HYDROCHLORIDE 40 MG: 40 TABLET ORAL at 08:33

## 2019-09-05 RX ADMIN — INSULIN LISPRO 1 UNITS: 100 INJECTION, SOLUTION INTRAVENOUS; SUBCUTANEOUS at 21:04

## 2019-09-05 RX ADMIN — Medication 10 ML: at 21:05

## 2019-09-05 RX ADMIN — Medication 10 ML: at 08:46

## 2019-09-05 RX ADMIN — INSULIN LISPRO 4 UNITS: 100 INJECTION, SOLUTION INTRAVENOUS; SUBCUTANEOUS at 08:45

## 2019-09-05 RX ADMIN — HYDROCODONE BITARTRATE AND ACETAMINOPHEN 1 TABLET: 10; 325 TABLET ORAL at 14:14

## 2019-09-05 RX ADMIN — INSULIN LISPRO 4 UNITS: 100 INJECTION, SOLUTION INTRAVENOUS; SUBCUTANEOUS at 11:50

## 2019-09-05 RX ADMIN — INSULIN GLARGINE 30 UNITS: 100 INJECTION, SOLUTION SUBCUTANEOUS at 08:35

## 2019-09-05 RX ADMIN — INSULIN LISPRO 20 UNITS: 100 INJECTION, SUSPENSION SUBCUTANEOUS at 11:51

## 2019-09-05 RX ADMIN — GABAPENTIN 200 MG: 100 CAPSULE ORAL at 14:14

## 2019-09-05 RX ADMIN — LISINOPRIL AND HYDROCHLOROTHIAZIDE 1 TABLET: 12.5; 1 TABLET ORAL at 08:33

## 2019-09-05 ASSESSMENT — PAIN DESCRIPTION - LOCATION: LOCATION: BACK

## 2019-09-05 ASSESSMENT — PAIN SCALES - GENERAL
PAINLEVEL_OUTOF10: 0
PAINLEVEL_OUTOF10: 9
PAINLEVEL_OUTOF10: 10
PAINLEVEL_OUTOF10: 5
PAINLEVEL_OUTOF10: 8
PAINLEVEL_OUTOF10: 0

## 2019-09-05 ASSESSMENT — PAIN DESCRIPTION - PAIN TYPE: TYPE: ACUTE PAIN

## 2019-09-05 ASSESSMENT — PAIN DESCRIPTION - ORIENTATION: ORIENTATION: LEFT;LOWER

## 2019-09-05 ASSESSMENT — PAIN DESCRIPTION - DESCRIPTORS: DESCRIPTORS: SHARP

## 2019-09-05 ASSESSMENT — PAIN DESCRIPTION - FREQUENCY: FREQUENCY: CONTINUOUS

## 2019-09-06 ENCOUNTER — APPOINTMENT (OUTPATIENT)
Dept: MRI IMAGING | Age: 74
DRG: 552 | End: 2019-09-06
Attending: FAMILY MEDICINE
Payer: COMMERCIAL

## 2019-09-06 ENCOUNTER — APPOINTMENT (OUTPATIENT)
Dept: CT IMAGING | Age: 74
DRG: 552 | End: 2019-09-06
Attending: FAMILY MEDICINE
Payer: COMMERCIAL

## 2019-09-06 ENCOUNTER — ANESTHESIA (OUTPATIENT)
Dept: MRI IMAGING | Age: 74
DRG: 552 | End: 2019-09-06
Payer: COMMERCIAL

## 2019-09-06 ENCOUNTER — HOSPITAL ENCOUNTER (INPATIENT)
Age: 74
LOS: 6 days | Discharge: NURSING, REHAB OR LONG-TERM CARE | DRG: 552 | End: 2019-09-12
Attending: FAMILY MEDICINE | Admitting: FAMILY MEDICINE
Payer: COMMERCIAL

## 2019-09-06 ENCOUNTER — ANESTHESIA EVENT (OUTPATIENT)
Dept: MRI IMAGING | Age: 74
DRG: 552 | End: 2019-09-06
Payer: COMMERCIAL

## 2019-09-06 VITALS — OXYGEN SATURATION: 83 % | DIASTOLIC BLOOD PRESSURE: 84 MMHG | SYSTOLIC BLOOD PRESSURE: 151 MMHG

## 2019-09-06 DIAGNOSIS — M54.9 INTRACTABLE BACK PAIN: Primary | ICD-10-CM

## 2019-09-06 PROBLEM — Z86.73 H/O: CVA (CEREBROVASCULAR ACCIDENT): Status: ACTIVE | Noted: 2019-09-06

## 2019-09-06 LAB
ABSOLUTE EOS #: 0.18 K/UL (ref 0–0.44)
ABSOLUTE IMMATURE GRANULOCYTE: <0.03 K/UL (ref 0–0.3)
ABSOLUTE LYMPH #: 1.19 K/UL (ref 1.1–3.7)
ABSOLUTE MONO #: 0.68 K/UL (ref 0.1–1.2)
ANION GAP SERPL CALCULATED.3IONS-SCNC: 11 MMOL/L (ref 9–17)
BASOPHILS # BLD: 1 % (ref 0–2)
BASOPHILS ABSOLUTE: 0.03 K/UL (ref 0–0.2)
BUN BLDV-MCNC: 37 MG/DL (ref 8–23)
BUN/CREAT BLD: ABNORMAL (ref 9–20)
C-REACTIVE PROTEIN: 9.8 MG/L (ref 0–5)
CALCIUM SERPL-MCNC: 8.6 MG/DL (ref 8.6–10.4)
CHLORIDE BLD-SCNC: 104 MMOL/L (ref 98–107)
CO2: 27 MMOL/L (ref 20–31)
CREAT SERPL-MCNC: 1.28 MG/DL (ref 0.7–1.2)
DIFFERENTIAL TYPE: ABNORMAL
EOSINOPHILS RELATIVE PERCENT: 3 % (ref 1–4)
GFR AFRICAN AMERICAN: >60 ML/MIN
GFR NON-AFRICAN AMERICAN: 55 ML/MIN
GFR SERPL CREATININE-BSD FRML MDRD: ABNORMAL ML/MIN/{1.73_M2}
GFR SERPL CREATININE-BSD FRML MDRD: ABNORMAL ML/MIN/{1.73_M2}
GLUCOSE BLD-MCNC: 101 MG/DL (ref 75–110)
GLUCOSE BLD-MCNC: 71 MG/DL (ref 75–110)
GLUCOSE BLD-MCNC: 84 MG/DL (ref 75–110)
GLUCOSE BLD-MCNC: 87 MG/DL (ref 75–110)
GLUCOSE BLD-MCNC: 89 MG/DL (ref 70–99)
HCT VFR BLD CALC: 31.8 % (ref 40.7–50.3)
HEMOGLOBIN: 10.4 G/DL (ref 13–17)
IMMATURE GRANULOCYTES: 0 %
LYMPHOCYTES # BLD: 18 % (ref 24–43)
MCH RBC QN AUTO: 31.7 PG (ref 25.2–33.5)
MCHC RBC AUTO-ENTMCNC: 32.7 G/DL (ref 28.4–34.8)
MCV RBC AUTO: 97 FL (ref 82.6–102.9)
MONOCYTES # BLD: 10 % (ref 3–12)
NRBC AUTOMATED: 0 PER 100 WBC
PDW BLD-RTO: 12.8 % (ref 11.8–14.4)
PLATELET # BLD: 298 K/UL (ref 138–453)
PLATELET ESTIMATE: ABNORMAL
PMV BLD AUTO: 10.6 FL (ref 8.1–13.5)
POTASSIUM SERPL-SCNC: 4.1 MMOL/L (ref 3.7–5.3)
PROSTATE SPECIFIC ANTIGEN: 3.49 UG/L
RBC # BLD: 3.28 M/UL (ref 4.21–5.77)
RBC # BLD: ABNORMAL 10*6/UL
SEDIMENTATION RATE, ERYTHROCYTE: 71 MM (ref 0–10)
SEG NEUTROPHILS: 68 % (ref 36–65)
SEGMENTED NEUTROPHILS ABSOLUTE COUNT: 4.45 K/UL (ref 1.5–8.1)
SODIUM BLD-SCNC: 142 MMOL/L (ref 135–144)
WBC # BLD: 6.6 K/UL (ref 3.5–11.3)
WBC # BLD: ABNORMAL 10*3/UL

## 2019-09-06 PROCEDURE — 3700000000 HC ANESTHESIA ATTENDED CARE

## 2019-09-06 PROCEDURE — 72148 MRI LUMBAR SPINE W/O DYE: CPT

## 2019-09-06 PROCEDURE — 99222 1ST HOSP IP/OBS MODERATE 55: CPT | Performed by: FAMILY MEDICINE

## 2019-09-06 PROCEDURE — 51798 US URINE CAPACITY MEASURE: CPT

## 2019-09-06 PROCEDURE — 1200000000 HC SEMI PRIVATE

## 2019-09-06 PROCEDURE — 3700000001 HC ADD 15 MINUTES (ANESTHESIA)

## 2019-09-06 PROCEDURE — 36415 COLL VENOUS BLD VENIPUNCTURE: CPT

## 2019-09-06 PROCEDURE — G0103 PSA SCREENING: HCPCS

## 2019-09-06 PROCEDURE — 85651 RBC SED RATE NONAUTOMATED: CPT

## 2019-09-06 PROCEDURE — 51701 INSERT BLADDER CATHETER: CPT

## 2019-09-06 PROCEDURE — 2580000003 HC RX 258: Performed by: PHYSICIAN ASSISTANT

## 2019-09-06 PROCEDURE — 85025 COMPLETE CBC W/AUTO DIFF WBC: CPT

## 2019-09-06 PROCEDURE — 6370000000 HC RX 637 (ALT 250 FOR IP): Performed by: NURSE PRACTITIONER

## 2019-09-06 PROCEDURE — 6360000002 HC RX W HCPCS: Performed by: NURSE ANESTHETIST, CERTIFIED REGISTERED

## 2019-09-06 PROCEDURE — 83036 HEMOGLOBIN GLYCOSYLATED A1C: CPT

## 2019-09-06 PROCEDURE — 6360000002 HC RX W HCPCS: Performed by: PHYSICIAN ASSISTANT

## 2019-09-06 PROCEDURE — 82947 ASSAY GLUCOSE BLOOD QUANT: CPT

## 2019-09-06 PROCEDURE — 80048 BASIC METABOLIC PNL TOTAL CA: CPT

## 2019-09-06 PROCEDURE — 2580000003 HC RX 258: Performed by: NURSE ANESTHETIST, CERTIFIED REGISTERED

## 2019-09-06 PROCEDURE — 6370000000 HC RX 637 (ALT 250 FOR IP): Performed by: PHYSICIAN ASSISTANT

## 2019-09-06 PROCEDURE — 99254 IP/OBS CNSLTJ NEW/EST MOD 60: CPT | Performed by: NEUROLOGICAL SURGERY

## 2019-09-06 PROCEDURE — 86140 C-REACTIVE PROTEIN: CPT

## 2019-09-06 RX ORDER — MORPHINE SULFATE 2 MG/ML
2 INJECTION, SOLUTION INTRAMUSCULAR; INTRAVENOUS
Status: DISCONTINUED | OUTPATIENT
Start: 2019-09-06 | End: 2019-09-10

## 2019-09-06 RX ORDER — MAGNESIUM SULFATE 1 G/100ML
1 INJECTION INTRAVENOUS PRN
Status: DISCONTINUED | OUTPATIENT
Start: 2019-09-06 | End: 2019-09-12 | Stop reason: HOSPADM

## 2019-09-06 RX ORDER — HYDROCODONE BITARTRATE AND ACETAMINOPHEN 5; 325 MG/1; MG/1
1 TABLET ORAL ONCE
Status: COMPLETED | OUTPATIENT
Start: 2019-09-06 | End: 2019-09-06

## 2019-09-06 RX ORDER — PROPOFOL 10 MG/ML
INJECTION, EMULSION INTRAVENOUS PRN
Status: DISCONTINUED | OUTPATIENT
Start: 2019-09-06 | End: 2019-09-06 | Stop reason: SDUPTHER

## 2019-09-06 RX ORDER — HYDROCODONE BITARTRATE AND ACETAMINOPHEN 5; 325 MG/1; MG/1
1 TABLET ORAL EVERY 8 HOURS PRN
Status: DISCONTINUED | OUTPATIENT
Start: 2019-09-06 | End: 2019-09-12 | Stop reason: HOSPADM

## 2019-09-06 RX ORDER — POTASSIUM CHLORIDE 7.45 MG/ML
10 INJECTION INTRAVENOUS PRN
Status: DISCONTINUED | OUTPATIENT
Start: 2019-09-06 | End: 2019-09-12 | Stop reason: HOSPADM

## 2019-09-06 RX ORDER — DEXTROSE MONOHYDRATE 50 MG/ML
100 INJECTION, SOLUTION INTRAVENOUS PRN
Status: DISCONTINUED | OUTPATIENT
Start: 2019-09-06 | End: 2019-09-12 | Stop reason: HOSPADM

## 2019-09-06 RX ORDER — SODIUM CHLORIDE 0.9 % (FLUSH) 0.9 %
10 SYRINGE (ML) INJECTION EVERY 12 HOURS SCHEDULED
Status: DISCONTINUED | OUTPATIENT
Start: 2019-09-06 | End: 2019-09-12 | Stop reason: HOSPADM

## 2019-09-06 RX ORDER — DEXTROSE MONOHYDRATE 25 G/50ML
12.5 INJECTION, SOLUTION INTRAVENOUS PRN
Status: DISCONTINUED | OUTPATIENT
Start: 2019-09-06 | End: 2019-09-12 | Stop reason: HOSPADM

## 2019-09-06 RX ORDER — INSULIN GLARGINE 100 [IU]/ML
30 INJECTION, SOLUTION SUBCUTANEOUS 2 TIMES DAILY
Status: DISCONTINUED | OUTPATIENT
Start: 2019-09-06 | End: 2019-09-12 | Stop reason: HOSPADM

## 2019-09-06 RX ORDER — LISINOPRIL AND HYDROCHLOROTHIAZIDE 12.5; 1 MG/1; MG/1
1 TABLET ORAL DAILY
Status: DISCONTINUED | OUTPATIENT
Start: 2019-09-06 | End: 2019-09-12 | Stop reason: HOSPADM

## 2019-09-06 RX ORDER — SODIUM CHLORIDE 0.9 % (FLUSH) 0.9 %
10 SYRINGE (ML) INJECTION PRN
Status: DISCONTINUED | OUTPATIENT
Start: 2019-09-06 | End: 2019-09-12 | Stop reason: HOSPADM

## 2019-09-06 RX ORDER — ACETAMINOPHEN 500 MG
1000 TABLET ORAL 3 TIMES DAILY PRN
Status: DISCONTINUED | OUTPATIENT
Start: 2019-09-06 | End: 2019-09-06

## 2019-09-06 RX ORDER — NICOTINE 21 MG/24HR
1 PATCH, TRANSDERMAL 24 HOURS TRANSDERMAL DAILY PRN
Status: DISCONTINUED | OUTPATIENT
Start: 2019-09-06 | End: 2019-09-12 | Stop reason: HOSPADM

## 2019-09-06 RX ORDER — LORAZEPAM 2 MG/ML
1 INJECTION INTRAMUSCULAR ONCE
Status: DISCONTINUED | OUTPATIENT
Start: 2019-09-06 | End: 2019-09-06

## 2019-09-06 RX ORDER — POTASSIUM CHLORIDE 20 MEQ/1
40 TABLET, EXTENDED RELEASE ORAL PRN
Status: DISCONTINUED | OUTPATIENT
Start: 2019-09-06 | End: 2019-09-12 | Stop reason: HOSPADM

## 2019-09-06 RX ORDER — DOCUSATE SODIUM 100 MG/1
100 CAPSULE, LIQUID FILLED ORAL PRN
Status: DISCONTINUED | OUTPATIENT
Start: 2019-09-06 | End: 2019-09-12 | Stop reason: HOSPADM

## 2019-09-06 RX ORDER — LORAZEPAM 2 MG/ML
2 INJECTION INTRAMUSCULAR ONCE
Status: DISCONTINUED | OUTPATIENT
Start: 2019-09-06 | End: 2019-09-12 | Stop reason: HOSPADM

## 2019-09-06 RX ORDER — SODIUM CHLORIDE, SODIUM LACTATE, POTASSIUM CHLORIDE, CALCIUM CHLORIDE 600; 310; 30; 20 MG/100ML; MG/100ML; MG/100ML; MG/100ML
INJECTION, SOLUTION INTRAVENOUS CONTINUOUS PRN
Status: DISCONTINUED | OUTPATIENT
Start: 2019-09-06 | End: 2019-09-06 | Stop reason: SDUPTHER

## 2019-09-06 RX ORDER — VERAPAMIL HYDROCHLORIDE 240 MG/1
240 TABLET, FILM COATED, EXTENDED RELEASE ORAL NIGHTLY
Status: DISCONTINUED | OUTPATIENT
Start: 2019-09-06 | End: 2019-09-12 | Stop reason: HOSPADM

## 2019-09-06 RX ORDER — ONDANSETRON 2 MG/ML
4 INJECTION INTRAMUSCULAR; INTRAVENOUS EVERY 6 HOURS PRN
Status: DISCONTINUED | OUTPATIENT
Start: 2019-09-06 | End: 2019-09-12 | Stop reason: HOSPADM

## 2019-09-06 RX ORDER — MORPHINE SULFATE 4 MG/ML
4 INJECTION, SOLUTION INTRAMUSCULAR; INTRAVENOUS
Status: DISCONTINUED | OUTPATIENT
Start: 2019-09-06 | End: 2019-09-10

## 2019-09-06 RX ORDER — ACETAMINOPHEN 325 MG/1
650 TABLET ORAL EVERY 4 HOURS PRN
Status: DISCONTINUED | OUTPATIENT
Start: 2019-09-06 | End: 2019-09-12 | Stop reason: HOSPADM

## 2019-09-06 RX ORDER — NICOTINE POLACRILEX 4 MG
15 LOZENGE BUCCAL PRN
Status: DISCONTINUED | OUTPATIENT
Start: 2019-09-06 | End: 2019-09-12 | Stop reason: HOSPADM

## 2019-09-06 RX ORDER — MIDAZOLAM HYDROCHLORIDE 1 MG/ML
INJECTION INTRAMUSCULAR; INTRAVENOUS PRN
Status: DISCONTINUED | OUTPATIENT
Start: 2019-09-06 | End: 2019-09-06 | Stop reason: SDUPTHER

## 2019-09-06 RX ADMIN — PROPOFOL 30 MG: 10 INJECTION, EMULSION INTRAVENOUS at 22:39

## 2019-09-06 RX ADMIN — HYDROCODONE BITARTRATE AND ACETAMINOPHEN 1 TABLET: 5; 325 TABLET ORAL at 17:38

## 2019-09-06 RX ADMIN — SODIUM CHLORIDE, PRESERVATIVE FREE 10 ML: 5 INJECTION INTRAVENOUS at 08:32

## 2019-09-06 RX ADMIN — SODIUM CHLORIDE, PRESERVATIVE FREE 10 ML: 5 INJECTION INTRAVENOUS at 20:49

## 2019-09-06 RX ADMIN — HYDROCODONE BITARTRATE AND ACETAMINOPHEN 1 TABLET: 5; 325 TABLET ORAL at 03:55

## 2019-09-06 RX ADMIN — PROPOFOL 50 MG: 10 INJECTION, EMULSION INTRAVENOUS at 22:49

## 2019-09-06 RX ADMIN — SODIUM CHLORIDE, POTASSIUM CHLORIDE, SODIUM LACTATE AND CALCIUM CHLORIDE: 600; 310; 30; 20 INJECTION, SOLUTION INTRAVENOUS at 21:53

## 2019-09-06 RX ADMIN — HYDROCODONE BITARTRATE AND ACETAMINOPHEN 1 TABLET: 5; 325 TABLET ORAL at 01:28

## 2019-09-06 RX ADMIN — VERAPAMIL HYDROCHLORIDE 240 MG: 240 TABLET, FILM COATED, EXTENDED RELEASE ORAL at 20:48

## 2019-09-06 RX ADMIN — ENOXAPARIN SODIUM 40 MG: 40 INJECTION SUBCUTANEOUS at 08:31

## 2019-09-06 RX ADMIN — MIDAZOLAM HYDROCHLORIDE 2 MG: 1 INJECTION, SOLUTION INTRAMUSCULAR; INTRAVENOUS at 22:11

## 2019-09-06 RX ADMIN — MIDAZOLAM HYDROCHLORIDE 2 MG: 1 INJECTION, SOLUTION INTRAMUSCULAR; INTRAVENOUS at 21:55

## 2019-09-06 RX ADMIN — LISINOPRIL AND HYDROCHLOROTHIAZIDE 1 TABLET: 12.5; 1 TABLET ORAL at 08:31

## 2019-09-06 ASSESSMENT — PAIN SCALES - GENERAL
PAINLEVEL_OUTOF10: 0
PAINLEVEL_OUTOF10: 4
PAINLEVEL_OUTOF10: 10
PAINLEVEL_OUTOF10: 8
PAINLEVEL_OUTOF10: 8
PAINLEVEL_OUTOF10: 7

## 2019-09-06 ASSESSMENT — PAIN DESCRIPTION - ONSET: ONSET: ON-GOING

## 2019-09-06 ASSESSMENT — PAIN DESCRIPTION - FREQUENCY: FREQUENCY: CONTINUOUS

## 2019-09-06 ASSESSMENT — PAIN DESCRIPTION - PAIN TYPE: TYPE: ACUTE PAIN

## 2019-09-06 ASSESSMENT — PAIN DESCRIPTION - ORIENTATION: ORIENTATION: LEFT

## 2019-09-06 ASSESSMENT — PAIN DESCRIPTION - LOCATION: LOCATION: BACK

## 2019-09-06 ASSESSMENT — PAIN DESCRIPTION - DESCRIPTORS: DESCRIPTORS: CONSTANT

## 2019-09-06 ASSESSMENT — PAIN DESCRIPTION - PROGRESSION: CLINICAL_PROGRESSION: NOT CHANGED

## 2019-09-06 NOTE — H&P
[facility]    HISTORY AND PHYSICAL EXAMINATION            Date:   9/6/2019  Patient name:  Amairani Valenciachchago  Date of admission:  9/6/2019 12:47 AM  MRN:   [id number]  Account:  [de-identified]  YOB: 1945  PCP:    Katty Steen MD  Room:   0080/4097-34  Code Status:    Full Code    Chief Complaint:     No chief complaint on file. Lower Back Pain. Bowel and Bladder Incontinence. Left Lower Extremity weakness. History Obtained From:     Patient, he is a poor historian     History of Present Illness: The patient is a 69 y.o. Non-/non  male who presents with No chief complaint on file. and he is admitted to the hospital for the management of  Back pain. Mr. Ashley Escobar is a 70 yo male presents with Back Pain, Bowel and Bladder incontinence, and Left lower extremity weakness. The patient notes that he has chronic back pain, told due to arthritis, and it got worse past 4-6 weeks, located in the lower back, midline, now 8/10 and increases to 10/10 when he stands. He lost control on bowel and bladder movements since 4 weeks ago, and had progressive weakness of his left leg since 4 weeks ago and he can not hold his weight. The patient went to [facility] and was transferred to here for MRI and Possible Neurosurgery. The patient notes having big concern on going into \"closed\" tube MRI and refuses to get in it unless he is sedated because he is \"scared\". The patient notes that he had a bowel surgery when he was a kid as he could not have bowel movement at that time, he also notes that he takes \"bood thinner pill\". Past Medical History:     Past Medical History:   Diagnosis Date    Cerebral artery occlusion with cerebral infarction ([facility] [address]     Diabetes mellitus ([facility] [address]     Hypertension         Past Surgical History:     No past surgical history on file.      Medications Prior to Admission:     Prior to Admission medications    Medication Sig Start Date End Date Taking? Authorizing Provider   verapamil (CALAN SR) 240 MG extended release tablet Take 240 mg by mouth nightly    Historical Provider, MD   HYDROcodone-acetaminophen (NORCO) 5-325 MG per tablet Take 1 tablet by mouth every 8 hours as needed for Pain. Historical Provider, MD   lisinopril-hydrochlorothiazide (PRINZIDE;ZESTORETIC) 10-12.5 MG per tablet Take 1 tablet by mouth daily    Historical Provider, MD   acetaminophen (TYLENOL) 500 MG tablet Take 1,000 mg by mouth 3 times daily as needed for Pain    Historical Provider, MD   docusate sodium (COLACE) 100 MG capsule Take 100 mg by mouth as needed for Constipation    Historical Provider, MD   GLIMEPIRIDE PO Take by mouth 2 times daily    Historical Provider, MD   insulin aspart protamine-insulin aspart (NOVOLOG MIX 70/30 FLEXPEN) (70-30) 100 UNIT/ML injection Inject into the skin three times daily     Historical Provider, MD   insulin glargine (LANTUS) 100 UNIT/ML injection vial Inject 30 Units into the skin 2 times daily     Historical Provider, MD   metFORMIN (GLUCOPHAGE) 500 MG tablet Take 500 mg by mouth 2 times daily (with meals)    Historical Provider, MD   traMADol (ULTRAM) 50 MG tablet Take 50 mg by mouth every 6 hours as needed for Pain    Historical Provider, MD        Allergies:     Patient has no known allergies. Social History:     Tobacco:    reports that he has never smoked. He has never used smokeless tobacco.  Alcohol:      reports that he does not drink alcohol. Drug Use:  reports that he does not use drugs. Family History:     No family history on file. Review of Systems:     Positive and Negative as described in HPI. CONSTITUTIONAL:  negative for fevers, chills, sweats, fatigue, weight loss  HEENT:  negative for vision, hearing changes, runny nose, throat pain  RESPIRATORY:  negative for shortness of breath, cough, congestion, wheezing.   CARDIOVASCULAR:  negative for nondistended, normal bowel sounds, no hepatomegaly or splenomegaly  Neurologic:Cranial Nerves II-XII are grossly intact, Muscle Strength in left UE and right LE are 5/5 and strength in Right UE and Left LE are 3/5. Skin: No gross lesions, rashes, bruising or bleeding on exposed skin area  Extremities:  peripheral pulses palpable, no pedal edema or calf pain with palpation  Psych: normal affect    Investigations:      Laboratory Testing:  Recent Results (from the past 24 hour(s))   Glucose, Whole Blood    Collection Time: 09/05/19 11:27 AM   Result Value Ref Range    POC Glucose 227 (H) 74 - 100 mg/dL   Glucose, Whole Blood    Collection Time: 09/05/19  4:16 PM   Result Value Ref Range    POC Glucose 98 74 - 100 mg/dL   Glucose, Whole Blood    Collection Time: 09/05/19  8:07 PM   Result Value Ref Range    POC Glucose 175 (H) 74 - 100 mg/dL   POC Glucose Fingerstick    Collection Time: 09/06/19  1:48 AM   Result Value Ref Range    POC Glucose 71 (L) 75 - 110 mg/dL   POC Glucose Fingerstick    Collection Time: 09/06/19  2:32 AM   Result Value Ref Range    POC Glucose 87 75 - 110 mg/dL   POC Glucose Fingerstick    Collection Time: 09/06/19  8:08 AM   Result Value Ref Range    POC Glucose 101 75 - 110 mg/dL       Imaging/Diagnostics:  Xr Lumbar Spine (2-3 Views)    Result Date: 9/5/2019  Multilevel degenerative disc disease and facet hypertrophy without acute abnormality. Degenerative change of the SI joints and hip joints. Mild-to-moderate amount of formed stool throughout the abdomen. Assessment :      Hospital Problems           Last Modified POA    Lumbar radiculopathy 9/5/2019 Yes           Differential Diagnosis:   - PMH of Diabetes and HTN.   - Cauda Equina Syndrome. - Amyotrophic Lateral Sclerosis - less likely   - Herniated Lumbar Disc.   - Cerebral Infarction.   - Spinal Cord Abscess/ Hematoma.    Plan:     Patient status Admit as inpatient in the  Med/Surge    - Consult Neurosurgery   - CT head w/o

## 2019-09-06 NOTE — PROGRESS NOTES
Occupational Therapy    Occupational Therapy Not Seen Note    DATE: 2019  Name: Hiral Dennis  : 1945  MRN: [id number]    Patient not available for Occupational Therapy due to:    Strict Bedrest, await imaging results    Next Scheduled Treatment: 19    Electronically signed by Gómez Rebolledo OT on 2019 at 11:07 AM

## 2019-09-06 NOTE — PROGRESS NOTES
Smoking Cessation - topics covered   []  Health Risks  []  Benefits of Quitting   []  Smoking Cessation  [x]  Patient has no history of tobacco use  []  Patient is former smoker. [x]  No need for tobacco cessation education. []  Booklet given  []  Patient verbalizes understanding. []  Patient denies need for tobacco cessation education. []  Unable to meet with patient today. Will follow up as able.   Joe Rush  7:43 AM

## 2019-09-06 NOTE — PROGRESS NOTES
Nutrition Assessment    Type and Reason for Visit: Initial, Positive Nutrition Screen    Nutrition Recommendations:  Encourage oral intakes    Nutrition Assessment: Predicted suboptimal energy intakes r/t muscular dysfunction, AEB low oral intakes and weight losses with back pain. Poor historian from a weight perspective, unable to provide useful data. Took PO well this morning. Less than optimally controlled diabetes, not appropriate for education . Malnutrition Assessment:  · Malnutrition Status: At risk for malnutrition  · Context: Acute illness or injury  · Findings of the 6 clinical characteristics of malnutrition (Minimum of 2 out of 6 clinical characteristics is required to make the diagnosis of moderate or severe Protein Calorie Malnutrition based on AND/ASPEN Guidelines):  1. Energy Intake-Less than or equal to 75% of estimated energy requirement, Greater than or equal to 5 days    2. Weight Loss-Unable to assess,    3. Fat Loss-No significant subcutaneous fat loss,    4. Muscle Loss-No significant muscle mass loss,    5. Fluid Accumulation-No significant fluid accumulation,    6.  Strength-Not measured    Nutrition Risk Level:  Moderate    Nutrient Needs:  · Estimated Daily Total Kcal: 4250-8566(89-38)  · Estimated Daily Protein (g): 100-109(1.2-1.3)  · Estimated Daily Total Fluid (ml/day): 2000    Nutrition Diagnosis:   · Problem: Predicted suboptimal energy intake  · Etiology: related to Muscular dysfunction     Signs and symptoms:  as evidenced by Patient report of(low oral intakes and weight losses with back pain)    Objective Information:  · Nutrition-Focused Physical Findings: no malnutrition indices  · Wound Type: None  · Current Nutrition Therapies:  · Oral Diet Orders: Carb Control 4 Carbs/Meal   · Oral Diet intake: %  · Oral Nutrition Supplement (ONS) Orders: None  · Anthropometric Measures:  · Ht: 6' 1\" (185.4 cm)   · Current Body Wt: 238 lb 1.6 oz (108 kg)  · Admission Body Wt:
Patient assessed and vitals obtained at this time. Pt is alert and oriented x4. Pt states he thinks he might have had a bowel movement but is unsure. Pt brief changed and cleaned up. Pt did have a bowel movement. Brief changed and patient handed the urinal before transport gets here. Pt denies the need for anything else at this time.
Pt asked for assistance to go to bathroom. Writer offered urinal but patient stated he thought he might have a bowel movement. Ar Carrero obtained and other nurses came to assist patient to bedside commode as pt refused bedpan. Upon attempting to stand at bedside patient laid back in bed stating the pain was too much to stand. Writer then offered bedpan and patient agreeable. Writer left room to retrieve a bedpan, returned to room to find patient reaching for urinal.  No void noted, pt then declined bedpan again. Writer encouraged patient to try but patient continued to refuse. Will continue to monitor and assist with toileting as needed.
SW met with pt to complete assessment. Pt is a 69year old  male admitted with intractable lower back pain. Pt lives with his spouse and some pets. Pt reports that he was not using any community services at home prior to his admission. Pt does report some past home health use and spouse had reported to nursing that this was [id number] in the past. Pt reports that prior to a week ago he was using a walker to ambulate with. Pt reports that he has been bed bound for about the past week. Pt reports that he has been using a urinal and has just been going where he is if he needs to go otherwise. Pt reports that he is not driving any longer and his wife will take him to Dr appointments. Pt is a full code and follows with Dr Roselia Goldman as PCP. Pt does not have advance directives and he is not interested in information on these at this time. Pt reports that he is uncertain about the cost of his medications and defers to his spouse for this information. Pt states that he is uncertain about what to do at discharge. Pt feels that he may need some rehab. SW and pt discussed his observation status and how this impacts payor status for rehab and that he would be responsible for paying this out of pocket. Pt again defers to spouse for decisions regarding this. Pt's spouse works third shift and pt states that she would be sleeping currently. SW will continue to work on discharge planning for pt. PA states that pt may require transfer to tertiary facility. SW will continue to follow.  Shaista Andino, [facility] 9/5/2019
in order to reduce fall risk   Patient Goals   Patient goals : \"to get rid of pain\"    Plan    Plan  Times per week: 7x/wk   Times per day: Twice a day  Plan weeks: 2x/daily except weekends 1x/daily   Current Treatment Recommendations: Strengthening, Gait Training, Patient/Caregiver Education & Training, Equipment Evaluation, Education, & procurement, Balance Training, Neuromuscular Re-education, Pain Management, Functional Mobility Training, Endurance Training, Home Exercise Program, Transfer Training, Safety Education & Training  Safety Devices  Type of devices: Patient at risk for falls, Left in bed, Call light within reach, All fall risk precautions in place     Therapy Time   Individual Concurrent Group Co-treatment   Time In 1247         Time Out 1305         Minutes [address]

## 2019-09-07 ENCOUNTER — APPOINTMENT (OUTPATIENT)
Dept: CT IMAGING | Age: 74
DRG: 552 | End: 2019-09-07
Attending: FAMILY MEDICINE
Payer: COMMERCIAL

## 2019-09-07 PROBLEM — R15.9 FECAL INCONTINENCE: Status: ACTIVE | Noted: 2019-09-07

## 2019-09-07 PROBLEM — R33.9 URINARY RETENTION: Status: ACTIVE | Noted: 2019-09-07

## 2019-09-07 LAB
ANION GAP SERPL CALCULATED.3IONS-SCNC: 12 MMOL/L (ref 9–17)
BUN BLDV-MCNC: 32 MG/DL (ref 8–23)
BUN/CREAT BLD: ABNORMAL (ref 9–20)
CALCIUM SERPL-MCNC: 8.7 MG/DL (ref 8.6–10.4)
CHLORIDE BLD-SCNC: 105 MMOL/L (ref 98–107)
CO2: 27 MMOL/L (ref 20–31)
CREAT SERPL-MCNC: 1.19 MG/DL (ref 0.7–1.2)
ESTIMATED AVERAGE GLUCOSE: 154 MG/DL
GFR AFRICAN AMERICAN: >60 ML/MIN
GFR NON-AFRICAN AMERICAN: >60 ML/MIN
GFR SERPL CREATININE-BSD FRML MDRD: ABNORMAL ML/MIN/{1.73_M2}
GFR SERPL CREATININE-BSD FRML MDRD: ABNORMAL ML/MIN/{1.73_M2}
GLUCOSE BLD-MCNC: 217 MG/DL (ref 75–110)
GLUCOSE BLD-MCNC: 237 MG/DL (ref 75–110)
GLUCOSE BLD-MCNC: 335 MG/DL (ref 75–110)
GLUCOSE BLD-MCNC: 99 MG/DL (ref 70–99)
HBA1C MFR BLD: 7 % (ref 4–6)
HCT VFR BLD CALC: 34 % (ref 40.7–50.3)
HEMOGLOBIN: 10.8 G/DL (ref 13–17)
INR BLD: 1
MCH RBC QN AUTO: 31.5 PG (ref 25.2–33.5)
MCHC RBC AUTO-ENTMCNC: 31.8 G/DL (ref 28.4–34.8)
MCV RBC AUTO: 99.1 FL (ref 82.6–102.9)
NRBC AUTOMATED: 0 PER 100 WBC
PDW BLD-RTO: 12.7 % (ref 11.8–14.4)
PLATELET # BLD: 268 K/UL (ref 138–453)
PMV BLD AUTO: 10.3 FL (ref 8.1–13.5)
POTASSIUM SERPL-SCNC: 4.4 MMOL/L (ref 3.7–5.3)
PROTHROMBIN TIME: 10.3 SEC (ref 9–12)
RBC # BLD: 3.43 M/UL (ref 4.21–5.77)
SODIUM BLD-SCNC: 144 MMOL/L (ref 135–144)
WBC # BLD: 7.3 K/UL (ref 3.5–11.3)

## 2019-09-07 PROCEDURE — 6370000000 HC RX 637 (ALT 250 FOR IP): Performed by: FAMILY MEDICINE

## 2019-09-07 PROCEDURE — 36415 COLL VENOUS BLD VENIPUNCTURE: CPT

## 2019-09-07 PROCEDURE — 85027 COMPLETE CBC AUTOMATED: CPT

## 2019-09-07 PROCEDURE — 70450 CT HEAD/BRAIN W/O DYE: CPT

## 2019-09-07 PROCEDURE — 85610 PROTHROMBIN TIME: CPT

## 2019-09-07 PROCEDURE — 6370000000 HC RX 637 (ALT 250 FOR IP): Performed by: PHYSICIAN ASSISTANT

## 2019-09-07 PROCEDURE — 99232 SBSQ HOSP IP/OBS MODERATE 35: CPT | Performed by: FAMILY MEDICINE

## 2019-09-07 PROCEDURE — 80048 BASIC METABOLIC PNL TOTAL CA: CPT

## 2019-09-07 PROCEDURE — 6360000002 HC RX W HCPCS: Performed by: PHYSICIAN ASSISTANT

## 2019-09-07 PROCEDURE — 2580000003 HC RX 258: Performed by: PHYSICIAN ASSISTANT

## 2019-09-07 PROCEDURE — 99231 SBSQ HOSP IP/OBS SF/LOW 25: CPT | Performed by: NEUROLOGICAL SURGERY

## 2019-09-07 PROCEDURE — 1200000000 HC SEMI PRIVATE

## 2019-09-07 PROCEDURE — 82947 ASSAY GLUCOSE BLOOD QUANT: CPT

## 2019-09-07 PROCEDURE — 6370000000 HC RX 637 (ALT 250 FOR IP): Performed by: STUDENT IN AN ORGANIZED HEALTH CARE EDUCATION/TRAINING PROGRAM

## 2019-09-07 PROCEDURE — 6360000002 HC RX W HCPCS: Performed by: NURSE PRACTITIONER

## 2019-09-07 RX ORDER — POLYETHYLENE GLYCOL 3350 17 G/17G
17 POWDER, FOR SOLUTION ORAL 2 TIMES DAILY
Status: DISCONTINUED | OUTPATIENT
Start: 2019-09-07 | End: 2019-09-12 | Stop reason: HOSPADM

## 2019-09-07 RX ORDER — TAMSULOSIN HYDROCHLORIDE 0.4 MG/1
0.4 CAPSULE ORAL DAILY
Status: DISCONTINUED | OUTPATIENT
Start: 2019-09-07 | End: 2019-09-12 | Stop reason: HOSPADM

## 2019-09-07 RX ORDER — LACTULOSE 10 G/15ML
30 SOLUTION ORAL 2 TIMES DAILY
Status: DISCONTINUED | OUTPATIENT
Start: 2019-09-07 | End: 2019-09-12 | Stop reason: HOSPADM

## 2019-09-07 RX ADMIN — HYDROCODONE BITARTRATE AND ACETAMINOPHEN 1 TABLET: 5; 325 TABLET ORAL at 19:19

## 2019-09-07 RX ADMIN — ONDANSETRON 4 MG: 2 INJECTION INTRAMUSCULAR; INTRAVENOUS at 14:50

## 2019-09-07 RX ADMIN — INSULIN GLARGINE 30 UNITS: 100 INJECTION, SOLUTION SUBCUTANEOUS at 23:20

## 2019-09-07 RX ADMIN — INSULIN GLARGINE 30 UNITS: 100 INJECTION, SOLUTION SUBCUTANEOUS at 11:07

## 2019-09-07 RX ADMIN — INSULIN LISPRO 2 UNITS: 100 INJECTION, SOLUTION INTRAVENOUS; SUBCUTANEOUS at 23:21

## 2019-09-07 RX ADMIN — VERAPAMIL HYDROCHLORIDE 240 MG: 240 TABLET, FILM COATED, EXTENDED RELEASE ORAL at 23:12

## 2019-09-07 RX ADMIN — LACTULOSE 30 G: 20 SOLUTION ORAL at 23:12

## 2019-09-07 RX ADMIN — MORPHINE SULFATE 4 MG: 4 INJECTION INTRAVENOUS at 14:53

## 2019-09-07 RX ADMIN — ENOXAPARIN SODIUM 40 MG: 40 INJECTION SUBCUTANEOUS at 10:58

## 2019-09-07 RX ADMIN — INSULIN LISPRO 8 UNITS: 100 INJECTION, SOLUTION INTRAVENOUS; SUBCUTANEOUS at 18:10

## 2019-09-07 RX ADMIN — POLYETHYLENE GLYCOL 3350 17 G: 17 POWDER, FOR SOLUTION ORAL at 23:12

## 2019-09-07 RX ADMIN — INSULIN LISPRO 4 UNITS: 100 INJECTION, SOLUTION INTRAVENOUS; SUBCUTANEOUS at 11:08

## 2019-09-07 RX ADMIN — SODIUM CHLORIDE, PRESERVATIVE FREE 10 ML: 5 INJECTION INTRAVENOUS at 10:58

## 2019-09-07 RX ADMIN — HYDROCODONE BITARTRATE AND ACETAMINOPHEN 1 TABLET: 5; 325 TABLET ORAL at 12:08

## 2019-09-07 RX ADMIN — SODIUM CHLORIDE, PRESERVATIVE FREE 10 ML: 5 INJECTION INTRAVENOUS at 23:27

## 2019-09-07 RX ADMIN — HYDROCODONE BITARTRATE AND ACETAMINOPHEN 1 TABLET: 5; 325 TABLET ORAL at 02:09

## 2019-09-07 RX ADMIN — TAMSULOSIN HYDROCHLORIDE 0.4 MG: 0.4 CAPSULE ORAL at 18:39

## 2019-09-07 ASSESSMENT — PAIN DESCRIPTION - FREQUENCY
FREQUENCY: INTERMITTENT
FREQUENCY: CONTINUOUS

## 2019-09-07 ASSESSMENT — PAIN DESCRIPTION - LOCATION
LOCATION: BACK;LEG
LOCATION: BACK

## 2019-09-07 ASSESSMENT — PAIN SCALES - GENERAL
PAINLEVEL_OUTOF10: 4
PAINLEVEL_OUTOF10: 7
PAINLEVEL_OUTOF10: 3
PAINLEVEL_OUTOF10: 7
PAINLEVEL_OUTOF10: 8
PAINLEVEL_OUTOF10: 7

## 2019-09-07 ASSESSMENT — PAIN DESCRIPTION - PAIN TYPE
TYPE: ACUTE PAIN
TYPE: ACUTE PAIN

## 2019-09-07 ASSESSMENT — PAIN DESCRIPTION - ORIENTATION: ORIENTATION: LEFT;LOWER

## 2019-09-07 ASSESSMENT — PAIN DESCRIPTION - DESCRIPTORS
DESCRIPTORS: ACHING;DISCOMFORT
DESCRIPTORS: ACHING;CONSTANT

## 2019-09-07 ASSESSMENT — PAIN DESCRIPTION - ONSET
ONSET: ON-GOING
ONSET: ON-GOING

## 2019-09-07 ASSESSMENT — PAIN DESCRIPTION - PROGRESSION
CLINICAL_PROGRESSION: NOT CHANGED
CLINICAL_PROGRESSION: NOT CHANGED

## 2019-09-07 ASSESSMENT — PAIN - FUNCTIONAL ASSESSMENT: PAIN_FUNCTIONAL_ASSESSMENT: PREVENTS OR INTERFERES WITH MANY ACTIVE NOT PASSIVE ACTIVITIES

## 2019-09-07 NOTE — CONSULTS
allergies. Social History:    Social History     Socioeconomic History    Marital status:      Spouse name: Not on file    Number of children: Not on file    Years of education: Not on file    Highest education level: Not on file   Occupational History    Not on file   Social Needs    Financial resource strain: Not on file    Food insecurity:     Worry: Not on file     Inability: Not on file    Transportation needs:     Medical: Not on file     Non-medical: Not on file   Tobacco Use    Smoking status: Never Smoker    Smokeless tobacco: Never Used   Substance and Sexual Activity    Alcohol use: No    Drug use: No    Sexual activity: Not on file   Lifestyle    Physical activity:     Days per week: Not on file     Minutes per session: Not on file    Stress: Not on file   Relationships    Social connections:     Talks on phone: Not on file     Gets together: Not on file     Attends Jewish service: Not on file     Active member of club or organization: Not on file     Attends meetings of clubs or organizations: Not on file     Relationship status: Not on file    Intimate partner violence:     Fear of current or ex partner: Not on file     Emotionally abused: Not on file     Physically abused: Not on file     Forced sexual activity: Not on file   Other Topics Concern    Not on file   Social History Narrative    Not on file     Family History:  History reviewed. No pertinent family history.     Previous Urologic Family history: none    REVIEW OF SYSTEMS:    Constitutional: negative  Eyes: negative  Respiratory: negative  Cardiovascular: negative  Gastrointestinal: negative  Genitourinary: see HPI  Musculoskeletal: negative  Skin: negative   Neurological: negative  Hematological/Lymphatic: negative  Psychological: negative    Physical Exam:    This a 69 y.o. male   Patient Vitals for the past 24 hrs:   BP Temp Temp src Pulse Resp SpO2   09/07/19 1045 (!) 153/49 99 °F (37.2 °C) Oral 90 18 96 %
rales   CARDIOVASCULAR:  RRR, no murmur   ABDOMEN:  Soft, no rigidity   NEUROLOGIC:  EYE OPENING     Spontaneous - 4 [x]       To voice - 3 []       To pain - 2 []       None - 1 []    VERBAL RESPONSE     Appropriate, oriented - 5 [x]       Dazed or confused - 4 []       Syllables, expletives - 3 []       Grunts - 2 []       None - 1 []    MOTOR RESPONSE     Spontaneous, command - 6 [x]       Localizes pain - 5 []       Withdraws pain - 4 []       Abnormal flexion - 3 []       Abnormal extension - 2 []       None - 1 []            Total GCS: 15    Mental Status:  A & O x3, awake             Cranial Nerves:    cranial nerves II-XII are grossly intact    Motor Exam:    Drift:  present - both lower extremity   Tone:  normal    5 out of 5 in the both upper extremities, 4 out of 5 in the right lower extremity, 2 out of 5 in the left lower extremity    Sensory:    Touch:    Patient developed increased sensation and glove and stock distribution secondary to diabetic neuropathy    Deep Tendon Reflexes:    Right Bicep:  trace  Left Bicep:  trace  Right Knee:  trace  Left Knee:  trace    Plantar Response:    Right:  equivocal  Left:  equivocal    Clonus:  absent  Chow's:  absent    Coordination/Dysmetria:    Finger to Nose:   Right:  normal  Left:  normal       Gait: Not assessed   SKIN:  no rash      LABS AND IMAGING:     Labs:  CBC with Differential:    Lab Results   Component Value Date    WBC 6.6 09/06/2019    RBC 3.28 09/06/2019    HGB 10.4 09/06/2019    HCT 31.8 09/06/2019     09/06/2019    MCV 97.0 09/06/2019    MCH 31.7 09/06/2019    MCHC 32.7 09/06/2019    RDW 12.8 09/06/2019    LYMPHOPCT 18 09/06/2019    MONOPCT 10 09/06/2019    BASOPCT 1 09/06/2019    MONOSABS 0.68 09/06/2019    LYMPHSABS 1.19 09/06/2019    EOSABS 0.18 09/06/2019    BASOSABS 0.03 09/06/2019    DIFFTYPE NOT REPORTED 09/06/2019     BMP:    Lab Results   Component Value Date     09/06/2019    K 4.1 09/06/2019     09/06/2019

## 2019-09-07 NOTE — PROGRESS NOTES
V[facility]    Progress Note    9/7/2019    2:53 PM    Name:   Nerissa Verma  MRN:     [id number]     Acct:      [de-identified]   Room:   [facility]/[address] [place] [address] Day:  1  Admit Date:  9/6/2019 12:47 AM    PCP:   Wendy Juárez MD  Code Status:  Full Code    Subjective:     C/C:  Back pain, LE weakness    Interval History Status: improved. Patient seen and examined at bedside, no acute events overnight. Feeling better overall today  Had MRI overnight, reviewed it myself  Output noted   Patient denies any chest pain, shortness of breath, chills, fevers, nausea or vomiting. Patient vitals, labs and all providers notes were reviewed,from overnight shift and morning updates were noted and discussed with the nurse    Brief History:      68-year-old gentleman who was transferred from outside facility with new onset left lower extremity weakness and possible urinary and fecal incontinence. Patient with known chronic lower back pain secondary to osteoarthritic changes developed new onset worsening of his symptoms 4 weeks ago with significant lower extremity weakness and inability to use his walker since then. Patient story regarding incontinence is not clear on questioning him he does report having chronic constipation with some incontinence and chronic urinary problems and small volumes with voiding, he still feel he had some worsening. Also he is feeling that his right lower extremity is slightly weaker than his baseline  The patient was transferred to be evaluated by neurosurgery team, as well he refused MRI at the outside facility     Review of Systems:     Constitutional:  negative for chills, fevers, sweats.  His fatigue improved   Respiratory:  negative for cough, dyspnea on exertion, hemoptysis, shortness of breath, wheezing  Cardiovascular:  negative for chest pain, chest pressure/discomfort, lower extremity edema, palpitations  Gastrointestinal:  negative for abdominal pain, constipation, diarrhea, nausea, vomiting. Still with fecal incontinence   Neurological:  negative for dizziness, headache    Medications: Allergies:  No Known Allergies    Current Meds:   Scheduled Meds:    polyethylene glycol  17 g Oral BID    lactulose  30 g Oral BID    insulin glargine  30 Units Subcutaneous BID    lisinopril-hydrochlorothiazide  1 tablet Oral Daily    verapamil  240 mg Oral Nightly    sodium chloride flush  10 mL Intravenous 2 times per day    enoxaparin  40 mg Subcutaneous Daily    insulin lispro  0-12 Units Subcutaneous TID WC    insulin lispro  0-6 Units Subcutaneous Nightly    LORazepam  2 mg Intravenous Once     Continuous Infusions:    dextrose       PRN Meds: docusate sodium, HYDROcodone-acetaminophen, sodium chloride flush, potassium chloride **OR** potassium alternative oral replacement **OR** potassium chloride, magnesium sulfate, magnesium hydroxide, ondansetron, nicotine, acetaminophen, glucose, dextrose, glucagon (rDNA), dextrose, morphine **OR** morphine    Data:     Past Medical History:   has a past medical history of Cerebral artery occlusion with cerebral infarction ([facility] [facility] [facility] and Hypertension. Social History:   reports that he has never smoked. He has never used smokeless tobacco. He reports that he does not drink alcohol or use drugs. Family History: History reviewed. No pertinent family history. Vitals:  BP (!) 153/49   Pulse 90   Temp 99 °F (37.2 °C) (Oral)   Resp 18   Ht 6' 1\" (1.854 m)   Wt 242 lb 12.8 oz (110.1 kg)   SpO2 96%   BMI 32.03 kg/m²   Temp (24hrs), Av.4 °F (36.9 °C), Min:97.7 °F (36.5 °C), Max:99 °F (37.2 °C)    Recent Labs     19  0232 19  0808 194 19  1107   POCGLU 87 101 84 217*       I/O (24Hr):     Intake/Output Summary (Last 24 hours) at 2019 1453  Last data filed at 2019 0700  Gross per 24 hour

## 2019-09-07 NOTE — PROGRESS NOTES
Neurosurgery Resident  Daily Progress Note    9/7/2019  8:50 AM    Chart reviewed. No acute events overnight. Reviewed MRI findings with patient and that there are mild diffuse degenerative changes to lumbar and lumbosacral spine though no cauda equina findings. Patient continues to endorse significant left lower extremity pain and back pain. Vitals:    09/06/19 0004 09/06/19 0800 09/06/19 2049   BP: (!) 144/96 130/82 (!) 157/71   Pulse: 90 75 84   Resp: 18 16 18   Temp: 98 °F (36.7 °C) 98.4 °F (36.9 °C) 97.7 °F (36.5 °C)   TempSrc: Oral Oral Oral   SpO2: 95% 96% 98%   Weight: 242 lb 12.8 oz (110.1 kg)     Height: 6' 1\" (1.854 m)         PE:   Gen: AA&Ox3, NAD, lying comfortably supine in bed eating breakfast  CVS: normal s2/s1 RRR  Resp: equal air entry bilaterally no w/r/r  Abd: soft, no rigidity, no guarding, ND/NT  Neuro:  Negative clonus, 2+ patellar and Achilles reflexes bilaterally, L2-S1 gross motor intact 5/5 bilaterally, sensation intact to light touch bilateral lower extremities though diffusely decreased to left lower extremity compared to contralateral      Lab Results   Component Value Date    WBC 7.3 09/07/2019    HGB 10.8 (L) 09/07/2019    HCT 34.0 (L) 09/07/2019     09/07/2019    ALT 20 09/04/2019    AST 16 09/04/2019     09/07/2019    K 4.4 09/07/2019     09/07/2019    CREATININE 1.19 09/07/2019    BUN 32 (H) 09/07/2019    CO2 27 09/07/2019    PSA 3.49 09/06/2019    INR 1.0 09/07/2019    LABA1C 7.0 (H) 09/06/2019       69 y.o. male with lumbar spondylosis and LLE radiculopathy. Patient care will be discussed with attending, will reevaluate patient along with attending.     - MRI reviewed and negative for severe compression findings, bowel and bladder incontinence not related to lumbar spine disease.   - CTLS recommendations: cleared. - Ok to begin prophylactic anticoagulation from neurosurgery standpoint.  However, we recommend careful evaluation of all other risk factors associated with anticoagulation therapy as applied to this patient's medical condition.   - No neurosurgical interventions planned for now. - Neuro checks per floor protocol.  - Recommend further work-up for other causes of incontinence. Please contact neurosurgery with any changes in patients neurologic status. Ronne Nyhan,   8:50 AM    Patient complaining of chronic neurologic symptoms primarily of difficulty emptying bladder. Has not seen neurology yet. Recommended further evaluation for possible BPH. During exam patient with decreased movement of the left leg primarily due to knee pain per the patient. 5 out of 5 distally. Decreased sensation of the lower extremities left greater than right which has been ongoing and managed by his outside physicians. Patient with known chronic back pain. Follow-up with orthopedics for knee pain which likely is the most significant factor in his current difficult with ambulation. No indication for neurosurgical intervention at this time.     Attending Supervising Physicians Attestation Statement  I was present with the resident physician during the history and exam. I discussed the findings and plans with the resident physician and agree as documented in his note     Electronically signed by Becki Grove MD on 9/8/19 at 3:13 PM

## 2019-09-07 NOTE — PROGRESS NOTES
Occupational Therapy    Occupational Therapy Not Seen Note    DATE: 2019  Name: West Car  : 1945  MRN: [id number]    Patient not available for Occupational Therapy due to:     MRI findings show mild diffuse degenerative changes to lumbar and lumbosacral spine though no cauda equina findings. Patient continues to endorse significant left lower extremity pain and back pain. Pt continues to be on Strict Bedrest, Rn to clarify activity orders.     Next Scheduled Treatment: 19    Electronically signed by Everlina Halsted, OT on 2019 at 2:32 PM

## 2019-09-07 NOTE — ANESTHESIA PRE PROCEDURE
gel 15 g  15 g Oral PRN Lorri D Delgrosso, APRN - CNP        dextrose 50 % IV solution  12.5 g Intravenous PRN Lorri D Delgrosso, APRN - CNP        glucagon (rDNA) injection 1 mg  1 mg Intramuscular PRN Lorri D Delgrosso, APRN - CNP        dextrose 5 % solution  100 mL/hr Intravenous PRN Lorri D Delgrosso, APRN - CNP        LORazepam (ATIVAN) injection 2 mg  2 mg Intravenous Once Leodan Knight MD        morphine (PF) injection 2 mg  2 mg Intravenous Q2H PRN Jimmy , APRN - CNP        Or    morphine injection 4 mg  4 mg Intravenous Q2H PRN Jimmy , APRN - CNP           Allergies:  No Known Allergies    Problem List:    Patient Active Problem List   Diagnosis Code    Intractable back pain M54.9    Hypertension I10    Diabetes mellitus ([facility] E11.9    Lumbar radiculopathy M54.16    H/O: CVA (cerebrovascular accident) Z80.78       Past Medical History:        Diagnosis Date    Cerebral artery occlusion with cerebral infarction [facility]     Diabetes mellitus [facility]     Hypertension        Past Surgical History:  No past surgical history on file. Social History:    Social History     Tobacco Use    Smoking status: Never Smoker    Smokeless tobacco: Never Used   Substance Use Topics    Alcohol use:  No                                Counseling given: Not Answered      Vital Signs (Current):   Vitals:    09/06/19 0004 09/06/19 0800 09/06/19 2049   BP: (!) 144/96 130/82 (!) 157/71   Pulse: 90 75 84   Resp: 18 16 18   Temp: 98 °F (36.7 °C) 98.4 °F (36.9 °C) 97.7 °F (36.5 °C)   TempSrc: Oral Oral Oral   SpO2: 95% 96% 98%   Weight: 242 lb 12.8 oz (110.1 kg)     Height: 6' 1\" (1.854 m)                                                BP Readings from Last 3 Encounters:   09/06/19 (!) 157/71   09/06/19 (!) 151/84   09/05/19 (!) 148/55       NPO Status:                                                                                 BMI:   Wt Readings from Last 3 Encounters: Anesthesia Plan      general and MAC     ASA 4 - emergent       Induction: intravenous. MIPS: Postoperative opioids intended and Prophylactic antiemetics administered. Anesthetic plan and risks discussed with patient. Plan discussed with CRNA.     Attending anesthesiologist reviewed and agrees with 2050 Jess Garcia MD   9/7/2019

## 2019-09-08 LAB
GLUCOSE BLD-MCNC: 159 MG/DL (ref 75–110)
GLUCOSE BLD-MCNC: 221 MG/DL (ref 75–110)
GLUCOSE BLD-MCNC: 223 MG/DL (ref 75–110)
GLUCOSE BLD-MCNC: 269 MG/DL (ref 75–110)

## 2019-09-08 PROCEDURE — 6370000000 HC RX 637 (ALT 250 FOR IP): Performed by: STUDENT IN AN ORGANIZED HEALTH CARE EDUCATION/TRAINING PROGRAM

## 2019-09-08 PROCEDURE — 99232 SBSQ HOSP IP/OBS MODERATE 35: CPT | Performed by: FAMILY MEDICINE

## 2019-09-08 PROCEDURE — 2580000003 HC RX 258: Performed by: PHYSICIAN ASSISTANT

## 2019-09-08 PROCEDURE — 6370000000 HC RX 637 (ALT 250 FOR IP): Performed by: PHYSICIAN ASSISTANT

## 2019-09-08 PROCEDURE — 51798 US URINE CAPACITY MEASURE: CPT

## 2019-09-08 PROCEDURE — 6360000002 HC RX W HCPCS: Performed by: PHYSICIAN ASSISTANT

## 2019-09-08 PROCEDURE — 82947 ASSAY GLUCOSE BLOOD QUANT: CPT

## 2019-09-08 PROCEDURE — 6370000000 HC RX 637 (ALT 250 FOR IP): Performed by: FAMILY MEDICINE

## 2019-09-08 PROCEDURE — 1200000000 HC SEMI PRIVATE

## 2019-09-08 PROCEDURE — 6360000002 HC RX W HCPCS: Performed by: NURSE PRACTITIONER

## 2019-09-08 RX ADMIN — POLYETHYLENE GLYCOL 3350 17 G: 17 POWDER, FOR SOLUTION ORAL at 21:13

## 2019-09-08 RX ADMIN — SODIUM CHLORIDE, PRESERVATIVE FREE 10 ML: 5 INJECTION INTRAVENOUS at 09:45

## 2019-09-08 RX ADMIN — LACTULOSE 30 G: 20 SOLUTION ORAL at 21:13

## 2019-09-08 RX ADMIN — VERAPAMIL HYDROCHLORIDE 240 MG: 240 TABLET, FILM COATED, EXTENDED RELEASE ORAL at 21:13

## 2019-09-08 RX ADMIN — INSULIN GLARGINE 30 UNITS: 100 INJECTION, SOLUTION SUBCUTANEOUS at 09:43

## 2019-09-08 RX ADMIN — INSULIN LISPRO 4 UNITS: 100 INJECTION, SOLUTION INTRAVENOUS; SUBCUTANEOUS at 12:12

## 2019-09-08 RX ADMIN — MORPHINE SULFATE 4 MG: 4 INJECTION INTRAVENOUS at 15:06

## 2019-09-08 RX ADMIN — INSULIN LISPRO 4 UNITS: 100 INJECTION, SOLUTION INTRAVENOUS; SUBCUTANEOUS at 09:43

## 2019-09-08 RX ADMIN — INSULIN GLARGINE 30 UNITS: 100 INJECTION, SOLUTION SUBCUTANEOUS at 21:13

## 2019-09-08 RX ADMIN — ENOXAPARIN SODIUM 40 MG: 40 INJECTION SUBCUTANEOUS at 09:43

## 2019-09-08 RX ADMIN — ONDANSETRON 4 MG: 2 INJECTION INTRAMUSCULAR; INTRAVENOUS at 15:06

## 2019-09-08 RX ADMIN — INSULIN LISPRO 3 UNITS: 100 INJECTION, SOLUTION INTRAVENOUS; SUBCUTANEOUS at 21:19

## 2019-09-08 RX ADMIN — HYDROCODONE BITARTRATE AND ACETAMINOPHEN 1 TABLET: 5; 325 TABLET ORAL at 04:10

## 2019-09-08 RX ADMIN — HYDROCODONE BITARTRATE AND ACETAMINOPHEN 1 TABLET: 5; 325 TABLET ORAL at 12:12

## 2019-09-08 RX ADMIN — TAMSULOSIN HYDROCHLORIDE 0.4 MG: 0.4 CAPSULE ORAL at 09:43

## 2019-09-08 RX ADMIN — INSULIN LISPRO 2 UNITS: 100 INJECTION, SOLUTION INTRAVENOUS; SUBCUTANEOUS at 18:28

## 2019-09-08 RX ADMIN — LISINOPRIL AND HYDROCHLOROTHIAZIDE 1 TABLET: 12.5; 1 TABLET ORAL at 09:43

## 2019-09-08 ASSESSMENT — PAIN DESCRIPTION - ONSET: ONSET: ON-GOING

## 2019-09-08 ASSESSMENT — PAIN SCALES - GENERAL
PAINLEVEL_OUTOF10: 10
PAINLEVEL_OUTOF10: 4
PAINLEVEL_OUTOF10: 7
PAINLEVEL_OUTOF10: 0
PAINLEVEL_OUTOF10: 9
PAINLEVEL_OUTOF10: 3
PAINLEVEL_OUTOF10: 3

## 2019-09-08 ASSESSMENT — PAIN DESCRIPTION - PAIN TYPE
TYPE: ACUTE PAIN
TYPE: CHRONIC PAIN

## 2019-09-08 ASSESSMENT — PAIN DESCRIPTION - LOCATION
LOCATION: BACK;LEG
LOCATION: LEG

## 2019-09-08 ASSESSMENT — PAIN DESCRIPTION - ORIENTATION: ORIENTATION: LEFT;LOWER

## 2019-09-08 NOTE — PROGRESS NOTES
Urology Progress Note    Subjective: Brady Anne is a 69 y.o. male. His/Her current Diet is: Dietary Nutrition Supplements: Diabetic Oral Supplement  DIET CARB CONTROL;. The patient is * No surgery found * from     No acute events overnight. No chest pain, shortness of breath, nausea, vomiting, fevers, chills  Tolerating Manriquez catheter  + small BM  Not ambulating    Patient Vitals for the past 24 hrs:   BP Temp Temp src Pulse Resp SpO2   09/07/19 1045 (!) 153/49 99 °F (37.2 °C) Oral 90 18 96 %       Intake/Output Summary (Last 24 hours) at 9/8/2019 0826  Last data filed at 9/7/2019 1813  Gross per 24 hour   Intake 500 ml   Output 750 ml   Net -250 ml       Recent Labs     09/06/19  0945 09/07/19 0624   WBC 6.6 7.3   HGB 10.4* 10.8*   HCT 31.8* 34.0*   MCV 97.0 99.1    268     Recent Labs     09/06/19 0945 09/07/19 0624    144   K 4.1 4.4    105   CO2 27 27   BUN 37* 32*   CREATININE 1.28* 1.19       No results for input(s): COLORU, PHUR, LABCAST, WBCUA, RBCUA, MUCUS, TRICHOMONAS, YEAST, BACTERIA, CLARITYU, SPECGRAV, LEUKOCYTESUR, UROBILINOGEN, BILIRUBINUR, BLOODU in the last 72 hours. Invalid input(s): NITRATE, GLUCOSEUKETONESUAMORPHOUS    Physical Exam:     NAD, AOx3  RRR. Peripheral pulses palpable  Respirations nonlabored, symmetric chest rise bilaterally  Abdomen: soft, nontender, nondistended  Lower extremities: No edema of calf tenderness bilaterally  Manriquez draining yellow urine    Interval Imaging Findings:    Imaging was independently reviewed and checked with radiologist report      Impression:         The patient is a 69 y.o. male  admitted with back pain, weakness, shooting pains down his legs.      Problem List  - Urinary Retention, Manriquez placed for unknown amount    Plan:     - Flomax  - Call for void trial prior to discharge, early.  Will get PVRs to ensure adequate emptying  - Cr 1.19  - Urinalysis shows no evidence of UTI, but triple phosphate stones were seen  -

## 2019-09-08 NOTE — PROGRESS NOTES
Manriquez removed as directed by Dr. Jackalyn Aase during rounds. DTV around 2100. Will obtain PVR after he voids if able, and bladder scan if not.  Will continue to monitor

## 2019-09-08 NOTE — PROGRESS NOTES
Vimal Hoff [facility]    Progress Note    9/8/2019    3:32 PM    Name:   Fortunato Dubin  MRN:     [id number]     Acct:      [de-identified]   Room:   [address] [place] [address] Day:  2  Admit Date:  9/6/2019 12:47 AM    PCP:   Chan Deluca MD  Code Status:  Full Code    Subjective:     C/C:  Back pain, LE weakness    Interval History Status: improved. Patient seen and examined at bedside, no acute events overnight. Feeling better overall today  Had 2 BM yesterday but refused laxatives  Output noted    No intervention per NS  Patient denies any chest pain, shortness of breath, chills, fevers, nausea or vomiting. Patient vitals, labs and all providers notes were reviewed,from overnight shift and morning updates were noted and discussed with the nurse    Brief History:      65-year-old gentleman who was transferred from outside facility with new onset left lower extremity weakness and possible urinary and fecal incontinence. Patient with known chronic lower back pain secondary to osteoarthritic changes developed new onset worsening of his symptoms 4 weeks ago with significant lower extremity weakness and inability to use his walker since then. Patient story regarding incontinence is not clear on questioning him he does report having chronic constipation with some incontinence and chronic urinary problems and small volumes with voiding, he still feel he had some worsening. Also he is feeling that his right lower extremity is slightly weaker than his baseline  The patient was transferred to be evaluated by neurosurgery team, as well he refused MRI at the outside facility     Review of Systems:     Constitutional:  negative for chills, fevers, sweats.  His fatigue improved   Respiratory:  negative for cough, dyspnea on exertion, hemoptysis, shortness of breath, wheezing  Cardiovascular:  negative for chest pain, chest pressure/discomfort, lower extremity edema, cooperative and no distress  Mental Status:  oriented to person, place and time and normal affect  Lungs:  clear to auscultation bilaterally, normal effort  Heart:  regular rate and rhythm, no murmur  Abdomen:  soft, nontender, nondistended, normal bowel sounds, no masses, hepatomegaly, splenomegaly  Extremities:  no edema, redness, tenderness in the calves  Skin:  no gross lesions, rashes, induration    Assessment:        Hospital Problems           Last Modified POA    * (Principal) Lumbar radiculopathy 9/6/2019 Yes    Intractable back pain 9/6/2019 Yes    Diabetes mellitus ([address] 9/6/2019 Yes    Hypertension 9/6/2019 Yes    H/O: CVA (cerebrovascular accident) 9/6/2019 Yes    Urinary retention 9/7/2019 Yes    Fecal incontinence 9/7/2019 Yes          Plan:        Lower back pain and lower extremity weakness:  Patient MRI  lumbar spine reviewed seems to be also arthritic disease has L4-L5 and L5-S1 disc bulge, multilevel neuroforaminal narrowing most pronounced at L4-L5 and L5- S1 some compression on L5 nerve root, chronic   Appreciate neurosurgery recs  Recommendations, no intervention  Neuro checks    Upper extremity weakness: Likely secondary to ongoing chronic process but  CT head  With no stroke     Urinary retention:  As mentioned in my note yesterday likely chronic, a check PSA that was normal  Ok to DC  Manriquez catheter ,  Bladder scan and straight cath as needed   Flomax  Might need urodynamic studies as OP per urology    Fecal incontinence:  As mentioned picture of chronic constipation, will do aggressive bowel regimen and reevaluate    Acute kidney injury: Currently resolved hydration, continue to monitor creatinine with  Daily BMP  Avoid nephrotoxic agents    Essential hypertension  Blood pressure control, goal is normotension, resumed home meds    DM2:  Glycemic control with sliding scale and hypoglycemia protocol     To work today with PT/OT     DVT prophylaxis      Annette Kincaid MD  9/8/2019  3:32 PM

## 2019-09-09 LAB
GLUCOSE BLD-MCNC: 127 MG/DL (ref 75–110)
GLUCOSE BLD-MCNC: 157 MG/DL (ref 75–110)
GLUCOSE BLD-MCNC: 160 MG/DL (ref 75–110)
GLUCOSE BLD-MCNC: 178 MG/DL (ref 75–110)

## 2019-09-09 PROCEDURE — 6370000000 HC RX 637 (ALT 250 FOR IP): Performed by: FAMILY MEDICINE

## 2019-09-09 PROCEDURE — 6360000002 HC RX W HCPCS: Performed by: PHYSICIAN ASSISTANT

## 2019-09-09 PROCEDURE — 1200000000 HC SEMI PRIVATE

## 2019-09-09 PROCEDURE — 99232 SBSQ HOSP IP/OBS MODERATE 35: CPT | Performed by: INTERNAL MEDICINE

## 2019-09-09 PROCEDURE — 97166 OT EVAL MOD COMPLEX 45 MIN: CPT

## 2019-09-09 PROCEDURE — 6370000000 HC RX 637 (ALT 250 FOR IP): Performed by: STUDENT IN AN ORGANIZED HEALTH CARE EDUCATION/TRAINING PROGRAM

## 2019-09-09 PROCEDURE — 51798 US URINE CAPACITY MEASURE: CPT

## 2019-09-09 PROCEDURE — 6370000000 HC RX 637 (ALT 250 FOR IP): Performed by: PHYSICIAN ASSISTANT

## 2019-09-09 PROCEDURE — 97162 PT EVAL MOD COMPLEX 30 MIN: CPT

## 2019-09-09 PROCEDURE — 82947 ASSAY GLUCOSE BLOOD QUANT: CPT

## 2019-09-09 PROCEDURE — 2580000003 HC RX 258: Performed by: PHYSICIAN ASSISTANT

## 2019-09-09 PROCEDURE — 97530 THERAPEUTIC ACTIVITIES: CPT

## 2019-09-09 RX ADMIN — SODIUM CHLORIDE, PRESERVATIVE FREE 10 ML: 5 INJECTION INTRAVENOUS at 09:19

## 2019-09-09 RX ADMIN — HYDROCODONE BITARTRATE AND ACETAMINOPHEN 1 TABLET: 5; 325 TABLET ORAL at 21:43

## 2019-09-09 RX ADMIN — INSULIN GLARGINE 30 UNITS: 100 INJECTION, SOLUTION SUBCUTANEOUS at 08:59

## 2019-09-09 RX ADMIN — LACTULOSE 30 G: 20 SOLUTION ORAL at 20:55

## 2019-09-09 RX ADMIN — VERAPAMIL HYDROCHLORIDE 240 MG: 240 TABLET, FILM COATED, EXTENDED RELEASE ORAL at 20:55

## 2019-09-09 RX ADMIN — SODIUM CHLORIDE, PRESERVATIVE FREE 10 ML: 5 INJECTION INTRAVENOUS at 21:00

## 2019-09-09 RX ADMIN — ENOXAPARIN SODIUM 40 MG: 40 INJECTION SUBCUTANEOUS at 09:00

## 2019-09-09 RX ADMIN — LACTULOSE 30 G: 20 SOLUTION ORAL at 09:00

## 2019-09-09 RX ADMIN — LISINOPRIL AND HYDROCHLOROTHIAZIDE 1 TABLET: 12.5; 1 TABLET ORAL at 11:46

## 2019-09-09 RX ADMIN — TAMSULOSIN HYDROCHLORIDE 0.4 MG: 0.4 CAPSULE ORAL at 08:59

## 2019-09-09 RX ADMIN — POLYETHYLENE GLYCOL 3350 17 G: 17 POWDER, FOR SOLUTION ORAL at 20:55

## 2019-09-09 RX ADMIN — INSULIN LISPRO 2 UNITS: 100 INJECTION, SOLUTION INTRAVENOUS; SUBCUTANEOUS at 11:46

## 2019-09-09 RX ADMIN — POLYETHYLENE GLYCOL 3350 17 G: 17 POWDER, FOR SOLUTION ORAL at 09:00

## 2019-09-09 RX ADMIN — INSULIN GLARGINE 30 UNITS: 100 INJECTION, SOLUTION SUBCUTANEOUS at 20:59

## 2019-09-09 RX ADMIN — INSULIN LISPRO 2 UNITS: 100 INJECTION, SOLUTION INTRAVENOUS; SUBCUTANEOUS at 09:00

## 2019-09-09 RX ADMIN — HYDROCODONE BITARTRATE AND ACETAMINOPHEN 1 TABLET: 5; 325 TABLET ORAL at 09:26

## 2019-09-09 RX ADMIN — INSULIN LISPRO 2 UNITS: 100 INJECTION, SOLUTION INTRAVENOUS; SUBCUTANEOUS at 16:32

## 2019-09-09 ASSESSMENT — PAIN SCALES - GENERAL
PAINLEVEL_OUTOF10: 5
PAINLEVEL_OUTOF10: 4
PAINLEVEL_OUTOF10: 5
PAINLEVEL_OUTOF10: 7

## 2019-09-09 ASSESSMENT — PAIN DESCRIPTION - PROGRESSION: CLINICAL_PROGRESSION: NOT CHANGED

## 2019-09-09 ASSESSMENT — PAIN DESCRIPTION - PAIN TYPE: TYPE: ACUTE PAIN

## 2019-09-09 ASSESSMENT — PAIN DESCRIPTION - FREQUENCY: FREQUENCY: CONTINUOUS

## 2019-09-09 ASSESSMENT — PAIN DESCRIPTION - LOCATION: LOCATION: BACK

## 2019-09-09 ASSESSMENT — PAIN DESCRIPTION - ONSET: ONSET: ON-GOING

## 2019-09-09 ASSESSMENT — PAIN - FUNCTIONAL ASSESSMENT: PAIN_FUNCTIONAL_ASSESSMENT: PREVENTS OR INTERFERES WITH MANY ACTIVE NOT PASSIVE ACTIVITIES

## 2019-09-09 ASSESSMENT — PAIN DESCRIPTION - DESCRIPTORS: DESCRIPTORS: ACHING;CONSTANT;DISCOMFORT

## 2019-09-09 NOTE — PROGRESS NOTES
Occupational Therapy   Occupational Therapy Initial Assessment  Date: 2019   Patient Name: Archie Escobar  MRN: [id number]     : 1945    Date of Service: 2019    Discharge Recommendations:    Further therapy recommended at discharge. Assessment   Performance deficits / Impairments: Decreased functional mobility ; Decreased strength;Decreased endurance;Decreased ADL status; Decreased high-level IADLs  Treatment Diagnosis: lumbar radiculopathy   Prognosis: Good  Decision Making: Medium Complexity  Patient Education: pt ed on POC, purpose of eval, importance of movement, hand placement during bed mobility, and benefits of therapy. fair return   REQUIRES OT FOLLOW UP: Yes  Activity Tolerance  Activity Tolerance: Patient limited by pain  Safety Devices  Safety Devices in place: Yes  Type of devices: Call light within reach; Left in bed  Restraints  Initially in place: No         Patient Diagnosis(es): There were no encounter diagnoses. has a past medical history of Cerebral artery occlusion with cerebral infarction ([facility] [address] [address] Diabetes mellitus ([address] [address] [address] and Hypertension. has no past surgical history on file. Treatment Diagnosis: lumbar radiculopathy       Restrictions  Restrictions/Precautions  Restrictions/Precautions: Fall Risk  Required Braces or Orthoses?: No  Position Activity Restriction  Other position/activity restrictions: CTLS clear, LLE weakness per epic    Subjective   General  Chart Reviewed: No  Patient assessed for rehabilitation services?: Yes  Family / Caregiver Present: No  Diagnosis: lumbar radiculopathy   General Comment  Comments: RN ok'd for therapy this afternoon.  Pt agreeable to participate in session and cooperative throughout   Patient Currently in Pain: Denies(at rest )    Oxygen Therapy  O2 Device: None (Room air)    Social/Functional History  Social/Functional History  Lives With: Spouse  Type of Home: House  Home Layout: One level, Laundry in basement  Home Access: Stairs

## 2019-09-09 NOTE — PROGRESS NOTES
Physical Therapy    Facility/Department: [facility] ONC/MED SURG  Initial Assessment    NAME: Dragan Brewer  : 1945  MRN: [id number]    Date of Service: 2019    Discharge Recommendations: Further therapy recommended at discharge. PT Equipment Recommendations  Equipment Needed: (CTA)    Assessment   Body structures, Functions, Activity limitations: Decreased functional mobility ; Decreased endurance;Decreased balance;Decreased ADL status; Decreased strength  Assessment: The pt required Max A to perform supine to sit and returned to supine impulsively. Unable to attempt or assess transfers and ambulation this date. Will continue to assess and progress mobility as appropriate. Prognosis: Good  Decision Making: Medium Complexity  PT Education: Plan of Care;PT Role;Goals;Transfer Training  REQUIRES PT FOLLOW UP: Yes  Activity Tolerance  Activity Tolerance: Patient limited by pain       Patient Diagnosis(es): There were no encounter diagnoses. has a past medical history of Cerebral artery occlusion with cerebral infarction ([facility] [address] Diabetes mellitus ([facility] [address] and Hypertension. has no past surgical history on file. Restrictions  Restrictions/Precautions  Restrictions/Precautions: Fall Risk  Required Braces or Orthoses?: No  Position Activity Restriction  Other position/activity restrictions: CTLS clear, LLE weakness per epic  Vision/Hearing  Vision: Impaired  Vision Exceptions: Wears glasses at all times  Hearing: Within functional limits     Subjective  General  Patient assessed for rehabilitation services?: Yes  Response To Previous Treatment: Not applicable  Family / Caregiver Present: No  Follows Commands: Within Functional Limits  Subjective  Subjective: RN and pt agreeable to PT. PT sidelying in bed upon arrival, denies pain, c/o \"bloating\".   Pain Screening  Patient Currently in Pain: Denies  Vital Signs  Patient Currently in Pain: Denies       Orientation  Orientation  Overall Orientation Status: impulsively returned to supine from sitting EOB  Ambulation  Ambulation?: No  Stairs/Curb  Stairs?: No     Balance  Posture: Fair  Sitting - Static: Good  Sitting - Dynamic: Good  Comments: The pt sat EOB indepenedently for ~1 minute and returned to supine without warning. Unable to assess standing.         Plan   Plan  Times per week: 5x/wk  Current Treatment Recommendations: Strengthening, Balance Training, Transfer Training, Functional Mobility Training, Endurance Training, Patient/Caregiver Education & Training, Safety Education & Training, Gait Training, Stair training, ROM  Safety Devices  Type of devices: Left in bed, Gait belt, Call light within reach, Nurse notified  Restraints  Initially in place: No    AM-PAC Score  AM-PAC Inpatient Mobility Raw Score : 10 (09/09/19 1508)  AM-PAC Inpatient T-Scale Score : 32.29 (09/09/19 1508)  Mobility Inpatient CMS 0-100% Score: 76.75 (09/09/19 1508)  Mobility Inpatient CMS G-Code Modifier : CL (09/09/19 1508)          Goals  Short term goals  Time Frame for Short term goals: 14 visits  Short term goal 1: Mod I for bed mobility  Short term goal 2: Perform sit to stand transfer with CGA  Short term goal 3: Ambulate 100ft with least restrictive AD and CGA  Short term goal 4: Demo Fair dynamic standing balance to decrease risk of falls  Short term goal 5: Participate in 30 minutes of therapy to demo increased activity tolerance       Therapy Time   Individual Concurrent Group Co-treatment   Time In 1405         Time Out 1419         Minutes 14         Timed Code Treatment Minutes: 8 Minutes       Willard Failing, PT

## 2019-09-09 NOTE — PROGRESS NOTES
Urology Progress Note    Subjective: Ana Garcia is a 69 y.o. male. His/Her current Diet is: Dietary Nutrition Supplements: Diabetic Oral Supplement  DIET CARB CONTROL;. The patient is * No surgery found * from     No acute events overnight. Manriquez out earlier. Voided once for 200 cc with no PVR recorded. No chest pain, shortness of breath, nausea, vomiting, fevers, chills     Not ambulating    Patient Vitals for the past 24 hrs:   BP Temp Temp src Pulse Resp SpO2 Weight   09/08/19 2113 (!) 159/64 97.7 °F (36.5 °C) Oral 78 18 95 % --   09/08/19 0945 -- -- -- -- -- -- 240 lb (108.9 kg)   09/08/19 0840 (!) 115/43 98.4 °F (36.9 °C) Oral 75 -- 98 % --       Intake/Output Summary (Last 24 hours) at 9/9/2019 0646  Last data filed at 9/8/2019 2350  Gross per 24 hour   Intake 600 ml   Output 700 ml   Net -100 ml       Recent Labs     09/06/19  0945 09/07/19  0624   WBC 6.6 7.3   HGB 10.4* 10.8*   HCT 31.8* 34.0*   MCV 97.0 99.1    268     Recent Labs     09/06/19  0945 09/07/19  0624    144   K 4.1 4.4    105   CO2 27 27   BUN 37* 32*   CREATININE 1.28* 1.19       No results for input(s): COLORU, PHUR, LABCAST, WBCUA, RBCUA, MUCUS, TRICHOMONAS, YEAST, BACTERIA, CLARITYU, SPECGRAV, LEUKOCYTESUR, UROBILINOGEN, BILIRUBINUR, BLOODU in the last 72 hours. Invalid input(s): NITRATE, GLUCOSEUKETONESUAMORPHOUS    Physical Exam:     NAD, AOx3  RRR.  Peripheral pulses palpable  Respirations nonlabored, symmetric chest rise bilaterally  Abdomen: soft, nontender, nondistended  Lower extremities: No edema of calf tenderness bilaterally  No Manriquez    Interval Imaging Findings:    Imaging was independently reviewed and checked with radiologist report      Impression:         The patient is a 69 y.o. male  admitted with back pain, weakness, shooting pains down his legs.      Problem List  - Urinary Retention, Manriquez placed for unknown amount    Plan:     - Flomax  -   PVRs to ensure adequate emptying  - Cr

## 2019-09-09 NOTE — PROGRESS NOTES
Instructed on double voiding. Voices understanding. Encouraged to sit at bedside or to stand to void. Reports inability to stand.

## 2019-09-09 NOTE — DISCHARGE INSTR - COC
conversation    IV Access:  - None    Nursing Mobility/ADLs:  Walking   Assisted  Transfer  Assisted  Bathing  Independent  Dressing  Independent  Toileting  Assisted  Feeding  Independent  Med Admin  Assisted  Med Delivery   none    Wound Care Documentation and Therapy:  Wound 02/07/17 Abrasion(s) Knee Left (Active)   Number of days: 943        Elimination:  Continence:   · Bowel: No  · Bladder: No  Urinary Catheter: None   Colostomy/Ileostomy/Ileal Conduit: No       Date of Last BM: ***    Intake/Output Summary (Last 24 hours) at 9/9/2019 1053  Last data filed at 9/8/2019 2350  Gross per 24 hour   Intake 600 ml   Output 700 ml   Net -100 ml     I/O last 3 completed shifts: In: 600 [P.O.:600]  Out: 700 [Urine:700]    Safety Concerns: At Risk for Falls    Impairments/Disabilities:      None    Nutrition Therapy:  Current Nutrition Therapy:   - Oral Diet:  Carb Control 4 carbs/meal (1800kcals/day)    Routes of Feeding: Oral  Liquids: No Restrictions  Daily Fluid Restriction: no  Last Modified Barium Swallow with Video (Video Swallowing Test): not done    Treatments at the Time of Hospital Discharge:   Respiratory Treatments: ***  Oxygen Therapy:  is not on home oxygen therapy.   Ventilator:    - No ventilator support    Rehab Therapies: Physical Therapy and Occupational Therapy  Weight Bearing Status/Restrictions: No weight bearing restirctions  Other Medical Equipment (for information only, NOT a DME order):  walker  Other Treatments: ***    Patient's personal belongings (please select all that are sent with patient):  Sent with all belongings    RN SIGNATURE:  Federico Louise RN    CASE MANAGEMENT/SOCIAL WORK SECTION    Inpatient Status Date: ***    Readmission Risk Assessment Score:  Readmission Risk              Risk of Unplanned Readmission:        14           Discharging to Facility/ Agency   · Name:   Jaleesa Michelle, [address], [place], [address] [place] [address]                                     Phone: [phone]

## 2019-09-10 LAB
GLUCOSE BLD-MCNC: 143 MG/DL (ref 75–110)
GLUCOSE BLD-MCNC: 164 MG/DL (ref 75–110)
GLUCOSE BLD-MCNC: 193 MG/DL (ref 75–110)
GLUCOSE BLD-MCNC: 307 MG/DL (ref 75–110)

## 2019-09-10 PROCEDURE — 1200000000 HC SEMI PRIVATE

## 2019-09-10 PROCEDURE — 6370000000 HC RX 637 (ALT 250 FOR IP): Performed by: PHYSICIAN ASSISTANT

## 2019-09-10 PROCEDURE — 6360000002 HC RX W HCPCS: Performed by: PHYSICIAN ASSISTANT

## 2019-09-10 PROCEDURE — 51798 US URINE CAPACITY MEASURE: CPT

## 2019-09-10 PROCEDURE — 97530 THERAPEUTIC ACTIVITIES: CPT

## 2019-09-10 PROCEDURE — 99232 SBSQ HOSP IP/OBS MODERATE 35: CPT | Performed by: INTERNAL MEDICINE

## 2019-09-10 PROCEDURE — 2580000003 HC RX 258: Performed by: PHYSICIAN ASSISTANT

## 2019-09-10 PROCEDURE — 6370000000 HC RX 637 (ALT 250 FOR IP): Performed by: STUDENT IN AN ORGANIZED HEALTH CARE EDUCATION/TRAINING PROGRAM

## 2019-09-10 PROCEDURE — 82947 ASSAY GLUCOSE BLOOD QUANT: CPT

## 2019-09-10 PROCEDURE — 6370000000 HC RX 637 (ALT 250 FOR IP): Performed by: FAMILY MEDICINE

## 2019-09-10 RX ADMIN — INSULIN LISPRO 2 UNITS: 100 INJECTION, SOLUTION INTRAVENOUS; SUBCUTANEOUS at 08:23

## 2019-09-10 RX ADMIN — SODIUM CHLORIDE, PRESERVATIVE FREE 10 ML: 5 INJECTION INTRAVENOUS at 08:21

## 2019-09-10 RX ADMIN — VERAPAMIL HYDROCHLORIDE 240 MG: 240 TABLET, FILM COATED, EXTENDED RELEASE ORAL at 20:23

## 2019-09-10 RX ADMIN — SODIUM CHLORIDE, PRESERVATIVE FREE 10 ML: 5 INJECTION INTRAVENOUS at 20:28

## 2019-09-10 RX ADMIN — HYDROCODONE BITARTRATE AND ACETAMINOPHEN 1 TABLET: 5; 325 TABLET ORAL at 17:19

## 2019-09-10 RX ADMIN — INSULIN LISPRO 4 UNITS: 100 INJECTION, SOLUTION INTRAVENOUS; SUBCUTANEOUS at 20:23

## 2019-09-10 RX ADMIN — INSULIN GLARGINE 30 UNITS: 100 INJECTION, SOLUTION SUBCUTANEOUS at 08:22

## 2019-09-10 RX ADMIN — ENOXAPARIN SODIUM 40 MG: 40 INJECTION SUBCUTANEOUS at 08:21

## 2019-09-10 RX ADMIN — INSULIN LISPRO 2 UNITS: 100 INJECTION, SOLUTION INTRAVENOUS; SUBCUTANEOUS at 12:20

## 2019-09-10 RX ADMIN — Medication 10 ML: at 20:29

## 2019-09-10 RX ADMIN — POLYETHYLENE GLYCOL 3350 17 G: 17 POWDER, FOR SOLUTION ORAL at 20:22

## 2019-09-10 RX ADMIN — TAMSULOSIN HYDROCHLORIDE 0.4 MG: 0.4 CAPSULE ORAL at 08:21

## 2019-09-10 RX ADMIN — LISINOPRIL AND HYDROCHLOROTHIAZIDE 1 TABLET: 12.5; 1 TABLET ORAL at 08:21

## 2019-09-10 RX ADMIN — LACTULOSE 30 G: 20 SOLUTION ORAL at 20:22

## 2019-09-10 RX ADMIN — LACTULOSE 30 G: 20 SOLUTION ORAL at 08:21

## 2019-09-10 RX ADMIN — INSULIN LISPRO 2 UNITS: 100 INJECTION, SOLUTION INTRAVENOUS; SUBCUTANEOUS at 17:18

## 2019-09-10 RX ADMIN — INSULIN GLARGINE 30 UNITS: 100 INJECTION, SOLUTION SUBCUTANEOUS at 20:24

## 2019-09-10 ASSESSMENT — PAIN SCALES - GENERAL: PAINLEVEL_OUTOF10: 7

## 2019-09-10 NOTE — CARE COORDINATION
Care Transition  [address] at [facility] and she will start precert this am. Will pull yesterdays PT/OT notes. Called Charmaine at \[facility]\"" Group finally submitted to insurance [id number].

## 2019-09-10 NOTE — PROGRESS NOTES
Vimal Hoff 19    Progress Note    9/10/2019    9:08 AM    Name:   Arsen Florentino  MRN:     [id number]     Acct:      [de-identified]   Room:   [address] [place] [address] Day:  4  Admit Date:  9/6/2019 12:47 AM    PCP:   Becca Allen MD  Code Status:  Full Code    Subjective:     C/C: Back pain, LE weakness    Interval History Status: not changed. No acute issues overnight  Symptoms stable  Working with PT/OT  Awaiting placement       Brief History:     72-year-old gentleman who was transferred from outside facility with new onset left lower extremity weakness and possible urinary and fecal incontinence. Patient with known chronic lower back pain secondary to osteoarthritic changes developed new onset worsening of his symptoms 4 weeks ago with significant lower extremity weakness and inability to use his walker since then. Patient story regarding incontinence is not clear on questioning him he does report having chronic constipation with some incontinence and chronic urinary problems and small volumes with voiding, he still feel he had some worsening. Also he is feeling that his right lower extremity is slightly weaker than his baseline. The patient was transferred to be evaluated by neurosurgery team, as well he refused MRI at the outside facility. Review of Systems:     Constitutional:  negative for chills, fevers, sweats  Respiratory:  negative for cough, dyspnea on exertion, shortness of breath, wheezing  Cardiovascular:  negative for chest pain, chest pressure/discomfort, lower extremity edema  Gastrointestinal:  negative for abdominal pain, constipation, diarrhea, nausea, vomiting  Neurological:  negative for dizziness, headache    Medications:      Allergies:  No Known Allergies    Current Meds:   Scheduled Meds:    polyethylene glycol  17 g Oral BID    lactulose  30 g Oral BID    tamsulosin  0.4 mg Oral Daily    insulin glargine  30 induration    Assessment:        Hospital Problems           Last Modified POA    * (Principal) Lumbar radiculopathy 9/6/2019 Yes    Intractable back pain 9/6/2019 Yes    Hypertension 9/6/2019 Yes    Diabetes mellitus ([address] 9/6/2019 Yes    H/O: CVA (cerebrovascular accident) 9/6/2019 Yes    Urinary retention 9/7/2019 Yes    Fecal incontinence 9/7/2019 Yes          Plan:        - Neurosurgery consulted - no acute intervention  - Urology following - continue Flomax, PVRs  - MRI reviewed - multilevel lumbar spondylosis results in mild L2-L3 and L3-L4 spinal canal stenosis.  Multilevel mild-to-moderate neural foraminal narrowing is most pronounced at L4-L5 and L5-S1 on the right  - Labs and medications reviewed   - Continue lantus 30 BID, last A1c 7.0 (9/6)   - Continue correction scale   - Pain control  - PT/OT evaluation  - DC planning - working on [facility]        Jose Norman MD  9/10/2019  9:08 AM

## 2019-09-11 LAB
GLUCOSE BLD-MCNC: 114 MG/DL (ref 75–110)
GLUCOSE BLD-MCNC: 165 MG/DL (ref 75–110)
GLUCOSE BLD-MCNC: 251 MG/DL (ref 75–110)

## 2019-09-11 PROCEDURE — 51798 US URINE CAPACITY MEASURE: CPT

## 2019-09-11 PROCEDURE — 6370000000 HC RX 637 (ALT 250 FOR IP): Performed by: STUDENT IN AN ORGANIZED HEALTH CARE EDUCATION/TRAINING PROGRAM

## 2019-09-11 PROCEDURE — 6360000002 HC RX W HCPCS: Performed by: PHYSICIAN ASSISTANT

## 2019-09-11 PROCEDURE — 97530 THERAPEUTIC ACTIVITIES: CPT

## 2019-09-11 PROCEDURE — 99231 SBSQ HOSP IP/OBS SF/LOW 25: CPT | Performed by: INTERNAL MEDICINE

## 2019-09-11 PROCEDURE — 1200000000 HC SEMI PRIVATE

## 2019-09-11 PROCEDURE — 2580000003 HC RX 258: Performed by: PHYSICIAN ASSISTANT

## 2019-09-11 PROCEDURE — 6370000000 HC RX 637 (ALT 250 FOR IP): Performed by: PHYSICIAN ASSISTANT

## 2019-09-11 PROCEDURE — 6370000000 HC RX 637 (ALT 250 FOR IP): Performed by: FAMILY MEDICINE

## 2019-09-11 PROCEDURE — 82947 ASSAY GLUCOSE BLOOD QUANT: CPT

## 2019-09-11 RX ADMIN — INSULIN GLARGINE 30 UNITS: 100 INJECTION, SOLUTION SUBCUTANEOUS at 22:16

## 2019-09-11 RX ADMIN — SODIUM CHLORIDE, PRESERVATIVE FREE 10 ML: 5 INJECTION INTRAVENOUS at 09:48

## 2019-09-11 RX ADMIN — LACTULOSE 30 G: 20 SOLUTION ORAL at 08:22

## 2019-09-11 RX ADMIN — INSULIN LISPRO 2 UNITS: 100 INJECTION, SOLUTION INTRAVENOUS; SUBCUTANEOUS at 12:45

## 2019-09-11 RX ADMIN — ACETAMINOPHEN 650 MG: 325 TABLET ORAL at 10:26

## 2019-09-11 RX ADMIN — HYDROCODONE BITARTRATE AND ACETAMINOPHEN 1 TABLET: 5; 325 TABLET ORAL at 04:21

## 2019-09-11 RX ADMIN — HYDROCODONE BITARTRATE AND ACETAMINOPHEN 1 TABLET: 5; 325 TABLET ORAL at 12:45

## 2019-09-11 RX ADMIN — VERAPAMIL HYDROCHLORIDE 240 MG: 240 TABLET, FILM COATED, EXTENDED RELEASE ORAL at 21:45

## 2019-09-11 RX ADMIN — TAMSULOSIN HYDROCHLORIDE 0.4 MG: 0.4 CAPSULE ORAL at 08:22

## 2019-09-11 RX ADMIN — INSULIN LISPRO 3 UNITS: 100 INJECTION, SOLUTION INTRAVENOUS; SUBCUTANEOUS at 22:15

## 2019-09-11 RX ADMIN — INSULIN LISPRO 2 UNITS: 100 INJECTION, SOLUTION INTRAVENOUS; SUBCUTANEOUS at 17:48

## 2019-09-11 RX ADMIN — LISINOPRIL AND HYDROCHLOROTHIAZIDE 1 TABLET: 12.5; 1 TABLET ORAL at 08:23

## 2019-09-11 RX ADMIN — HYDROCODONE BITARTRATE AND ACETAMINOPHEN 1 TABLET: 5; 325 TABLET ORAL at 21:33

## 2019-09-11 RX ADMIN — INSULIN GLARGINE 30 UNITS: 100 INJECTION, SOLUTION SUBCUTANEOUS at 08:24

## 2019-09-11 RX ADMIN — ENOXAPARIN SODIUM 40 MG: 40 INJECTION SUBCUTANEOUS at 08:27

## 2019-09-11 RX ADMIN — SODIUM CHLORIDE, PRESERVATIVE FREE 10 ML: 5 INJECTION INTRAVENOUS at 21:45

## 2019-09-11 ASSESSMENT — PAIN DESCRIPTION - ONSET
ONSET: ON-GOING

## 2019-09-11 ASSESSMENT — PAIN DESCRIPTION - LOCATION
LOCATION: BACK

## 2019-09-11 ASSESSMENT — PAIN DESCRIPTION - FREQUENCY
FREQUENCY: CONTINUOUS

## 2019-09-11 ASSESSMENT — PAIN DESCRIPTION - PROGRESSION
CLINICAL_PROGRESSION: NOT CHANGED
CLINICAL_PROGRESSION: GRADUALLY WORSENING

## 2019-09-11 ASSESSMENT — PAIN DESCRIPTION - ORIENTATION
ORIENTATION: LOWER

## 2019-09-11 ASSESSMENT — PAIN - FUNCTIONAL ASSESSMENT: PAIN_FUNCTIONAL_ASSESSMENT: PREVENTS OR INTERFERES WITH MANY ACTIVE NOT PASSIVE ACTIVITIES

## 2019-09-11 ASSESSMENT — PAIN SCALES - GENERAL
PAINLEVEL_OUTOF10: 3
PAINLEVEL_OUTOF10: 3
PAINLEVEL_OUTOF10: 10
PAINLEVEL_OUTOF10: 6
PAINLEVEL_OUTOF10: 8
PAINLEVEL_OUTOF10: 7
PAINLEVEL_OUTOF10: 5
PAINLEVEL_OUTOF10: 7

## 2019-09-11 ASSESSMENT — PAIN DESCRIPTION - DESCRIPTORS
DESCRIPTORS: ACHING;CONSTANT;DISCOMFORT
DESCRIPTORS: ACHING;CONSTANT
DESCRIPTORS: ACHING;CONSTANT

## 2019-09-11 ASSESSMENT — PAIN DESCRIPTION - PAIN TYPE
TYPE: ACUTE PAIN
TYPE: ACUTE PAIN
TYPE: ACUTE PAIN;CHRONIC PAIN
TYPE: ACUTE PAIN
TYPE: ACUTE PAIN

## 2019-09-11 NOTE — PROGRESS NOTES
Vimal Hoff 19    Progress Note    9/11/2019    8:44 AM    Name:   Ami Castro  MRN:     [id number]     Acct:      [de-identified]   Room:   [address] [place] [address] Day:  5  Admit Date:  9/6/2019 12:47 AM    PCP:   Umer Mckinney MD  Code Status:  Full Code    Subjective:     C/C: Back pain, LE weakness    Interval History Status: not changed. No acute issues overnight  No current complaints  Working on placement     Brief History:     37-year-old gentleman who was transferred from outside facility with new onset left lower extremity weakness and possible urinary and fecal incontinence. Patient with known chronic lower back pain secondary to osteoarthritic changes developed new onset worsening of his symptoms 4 weeks ago with significant lower extremity weakness and inability to use his walker since then. Patient story regarding incontinence is not clear on questioning him he does report having chronic constipation with some incontinence and chronic urinary problems and small volumes with voiding, he still feel he had some worsening. Also he is feeling that his right lower extremity is slightly weaker than his baseline. The patient was transferred to be evaluated by neurosurgery team, as well he refused MRI at the outside facility. Review of Systems:     Constitutional:  negative for chills, fevers, sweats  Respiratory:  negative for cough, dyspnea on exertion, shortness of breath, wheezing  Cardiovascular:  negative for chest pain, chest pressure/discomfort, lower extremity edema  Gastrointestinal:  negative for abdominal pain, constipation, diarrhea, nausea, vomiting  Neurological:  negative for dizziness, headache    Medications:      Allergies:  No Known Allergies    Current Meds:   Scheduled Meds:    polyethylene glycol  17 g Oral BID    lactulose  30 g Oral BID    tamsulosin  0.4 mg Oral Daily    insulin glargine  30 Units Subcutaneous BID    lisinopril-hydrochlorothiazide  1 tablet Oral Daily    verapamil  240 mg Oral Nightly    sodium chloride flush  10 mL Intravenous 2 times per day    enoxaparin  40 mg Subcutaneous Daily    insulin lispro  0-12 Units Subcutaneous TID WC    insulin lispro  0-6 Units Subcutaneous Nightly    LORazepam  2 mg Intravenous Once     Continuous Infusions:    dextrose       PRN Meds: docusate sodium, HYDROcodone-acetaminophen, sodium chloride flush, potassium chloride **OR** potassium alternative oral replacement **OR** potassium chloride, magnesium sulfate, magnesium hydroxide, ondansetron, nicotine, acetaminophen, glucose, dextrose, glucagon (rDNA), dextrose    Data:     Past Medical History:   has a past medical history of Cerebral artery occlusion with cerebral infarction ([facility] [address] Diabetes mellitus ([facility] [address] and Hypertension. Social History:   reports that he has never smoked. He has never used smokeless tobacco. He reports that he does not drink alcohol or use drugs. Family History: History reviewed. No pertinent family history. Vitals:  BP (!) 145/50   Pulse 75   Temp 98 °F (36.7 °C) (Oral)   Resp 14   Ht 6' 1\" (1.854 m)   Wt 240 lb (108.9 kg)   SpO2 95%   BMI 31.66 kg/m²   Temp (24hrs), Av.3 °F (36.8 °C), Min:98 °F (36.7 °C), Max:98.4 °F (36.9 °C)    Recent Labs     09/10/19  1159 09/10/19  1634 09/10/19  2021 09/11/19  0733   POCGLU 164* 193* 307* 114*       I/O (24Hr): Intake/Output Summary (Last 24 hours) at 2019 0844  Last data filed at 2019 0215  Gross per 24 hour   Intake --   Output 935 ml   Net -935 ml       Labs:  Hematology:  No results for input(s): WBC, RBC, HGB, HCT, MCV, MCH, MCHC, RDW, PLT, MPV, SEDRATE, CRP, INR, DDIMER, VK4NHDPE, LABABSO in the last 72 hours.     Invalid input(s): PT  Chemistry:  No results for input(s): NA, K, CL, CO2, GLUCOSE, BUN, CREATININE, MG, ANIONGAP, LABGLOM, GFRAA, CALCIUM, CAION, PHOS, PSA, PROBNP, TROPHS, CKTOTAL, CKMB, CKMBINDEX, MYOGLOBIN, DIGOXIN, LACTACIDWB in the last 72 hours. Recent Labs     09/09/19  2058 09/10/19  0758 09/10/19  1159 09/10/19  1634 09/10/19  2021 09/11/19  0733   POCGLU 127* 143* 164* 193* 307* 114*     ABG:  Lab Results   Component Value Date    FIO2 NOT REPORTED 04/23/2019     No results found for: SPECIAL  No results found for: CULTURE    Radiology:  Xr Lumbar Spine (2-3 Views)    Result Date: 9/5/2019  Multilevel degenerative disc disease and facet hypertrophy without acute abnormality. Degenerative change of the SI joints and hip joints. Mild-to-moderate amount of formed stool throughout the abdomen. Ct Head Wo Contrast    Result Date: 9/7/2019  No acute intracranial abnormality. White matter hypoattenuation described is typical of microvascular ischemic disease or as sequela of dysmyelinating/demyelinating processes. Mri Lumbar Spine Wo Contrast    Result Date: 9/6/2019  Multilevel lumbar spondylosis results in mild L2-L3 and L3-L4 spinal canal stenosis. Multilevel mild-to-moderate neural foraminal narrowing is most pronounced at L4-L5 and L5-S1 on the right. No severe lumbar spinal canal stenosis at any level. L4-L5 and L5-S1 right foraminal/extraforaminal protrusions/osteophytes abut the exiting right L4 and L5 nerve roots. Correlate for radicular symptoms in the right L4 and L5 distributions. Patchy edema throughout the bilateral paraspinous musculature is nonspecific and could relate to acute or chronic injury and/or denervation edema.        Physical Examination:        General appearance:  alert, cooperative and no distress  Mental Status:  oriented to person, place and time and normal affect  Lungs:  clear to auscultation bilaterally, normal effort  Heart:  regular rate and rhythm  Abdomen:  soft, nontender, nondistended, normal bowel sounds  Extremities:  no edema, redness, tenderness in the calves  Skin:  no gross lesions, rashes, induration    Assessment:        Hospital Problems

## 2019-09-11 NOTE — PLAN OF CARE
Problem: Falls - Risk of:  Goal: Will remain free from falls  Description  Will remain free from falls  9/10/2019 1756 by Elo Yee RN  Outcome: Ongoing  9/10/2019 0548 by Gayle Templeton RN  Outcome: Met This Shift  Goal: Absence of physical injury  Description  Absence of physical injury  9/10/2019 1756 by Elo Yee RN  Outcome: Ongoing  9/10/2019 0548 by Gayle Templeton RN  Outcome: Met This Shift     Problem: Pain:  Goal: Pain level will decrease  Description  Pain level will decrease  9/10/2019 1756 by Elo Yee RN  Outcome: Ongoing  9/10/2019 0548 by Gayle Templeton RN  Outcome: Ongoing  Goal: Control of acute pain  Description  Control of acute pain  9/10/2019 1756 by Elo Yee RN  Outcome: Ongoing  9/10/2019 0548 by Gayle Templeton RN  Outcome: Ongoing  Goal: Control of chronic pain  Description  Control of chronic pain  9/10/2019 1756 by Elo Yee RN  Outcome: Ongoing  9/10/2019 0548 by Gayle Templeton RN  Outcome: Ongoing     Problem: Infection:  Goal: Will remain free from infection  Description  Will remain free from infection  9/10/2019 1756 by Elo Yee RN  Outcome: Ongoing  9/10/2019 0548 by Gayle Templeton RN  Outcome: Ongoing     Problem: Safety:  Goal: Free from accidental physical injury  Description  Free from accidental physical injury  9/10/2019 1756 by Elo Yee RN  Outcome: Ongoing  9/10/2019 0548 by Gayle Templeton RN  Outcome: Met This Shift     Problem: Daily Care:  Goal: Daily care needs are met  Description  Daily care needs are met  9/10/2019 1756 by Elo Yee RN  Outcome: Ongoing  9/10/2019 0548 by Gayle Templeton RN  Outcome: Met This Shift     Problem: Discharge Planning:  Goal: Patients continuum of care needs are met  Description  Patients continuum of care needs are met  Outcome: Ongoing     Problem: Nutrition  Goal: Optimal nutrition therapy  9/10/2019 1756 by Elo Yee RN  Outcome: Ongoing  9/10/2019 1126 by Adrian Quintero RD,
Problem: Falls - Risk of:  Goal: Will remain free from falls  Description  Will remain free from falls  9/11/2019 0511 by Suellen Tolbert RN  Outcome: Met This Shift  9/10/2019 1756 by Yudith Melvin RN  Outcome: Ongoing  Goal: Absence of physical injury  Description  Absence of physical injury  9/11/2019 0511 by Suellen Tolbert RN  Outcome: Met This Shift  9/10/2019 1756 by Yudith Melvin RN  Outcome: Ongoing     Problem: Safety:  Goal: Free from accidental physical injury  Description  Free from accidental physical injury  9/11/2019 0511 by Suellen Tolbert RN  Outcome: Met This Shift  9/10/2019 1756 by Yudith Melvin RN  Outcome: Ongoing     Problem: Daily Care:  Goal: Daily care needs are met  Description  Daily care needs are met  9/11/2019 0511 by Suellen Tolbert RN  Outcome: Met This Shift  9/10/2019 1756 by Yudith Melvin RN  Outcome: Ongoing     Problem: Pain:  Goal: Pain level will decrease  Description  Pain level will decrease  9/11/2019 0511 by Suellen Tolbert RN  Outcome: Ongoing  9/10/2019 1756 by Yudith Melvin RN  Outcome: Ongoing  Goal: Control of acute pain  Description  Control of acute pain  9/11/2019 0511 by Suellen Tolbert RN  Outcome: Ongoing  9/10/2019 1756 by Yudith Mlevin RN  Outcome: Ongoing  Goal: Control of chronic pain  Description  Control of chronic pain  9/11/2019 0511 by Suellen Tolbert RN  Outcome: Ongoing  9/10/2019 1756 by Yudith Melvin RN  Outcome: Ongoing     Problem: Infection:  Goal: Will remain free from infection  Description  Will remain free from infection  9/11/2019 0511 by Suellen Tolbert RN  Outcome: Ongoing  9/10/2019 1756 by Yudith Melvin RN  Outcome: Ongoing     Problem: Discharge Planning:  Goal: Patients continuum of care needs are met  Description  Patients continuum of care needs are met  9/10/2019 1756 by Yudith Melvin RN  Outcome: Ongoing     Problem: Nutrition  Goal: Optimal nutrition therapy  9/10/2019 1756 by Yudith Melvin RN  Outcome: Ongoing
Problem: Falls - Risk of:  Goal: Will remain free from falls  Description  Will remain free from falls  9/11/2019 1416 by Lily Arthur RN  Outcome: Ongoing  9/11/2019 0511 by Nicolasa Boyle RN  Outcome: Met This Shift  Goal: Absence of physical injury  Description  Absence of physical injury  9/11/2019 1416 by Lily Arthur RN  Outcome: Ongoing  9/11/2019 0511 by Nicolasa Boyle RN  Outcome: Met This Shift     Problem: Pain:  Goal: Pain level will decrease  Description  Pain level will decrease  9/11/2019 1416 by Lily Arthur RN  Outcome: Ongoing  9/11/2019 0511 by Nicolasa Boyle RN  Outcome: Ongoing  Goal: Control of acute pain  Description  Control of acute pain  9/11/2019 1416 by Lily Arthur RN  Outcome: Ongoing  9/11/2019 0511 by Nicolasa Boyle RN  Outcome: Ongoing  Goal: Control of chronic pain  Description  Control of chronic pain  9/11/2019 1416 by Lily Arthur RN  Outcome: Ongoing  9/11/2019 0511 by Nicolasa Boyle RN  Outcome: Ongoing     Problem: Infection:  Goal: Will remain free from infection  Description  Will remain free from infection  9/11/2019 1416 by Lily Arthur RN  Outcome: Ongoing  9/11/2019 0511 by Nicolasa Boyle RN  Outcome: Ongoing     Problem: Safety:  Goal: Free from accidental physical injury  Description  Free from accidental physical injury  9/11/2019 1416 by Lily Arthur RN  Outcome: Ongoing  9/11/2019 0511 by Nicolasa Boyle RN  Outcome: Met This Shift     Problem: Daily Care:  Goal: Daily care needs are met  Description  Daily care needs are met  9/11/2019 1416 by Lily Arthur RN  Outcome: Ongoing  9/11/2019 0511 by Nicolasa Boyle RN  Outcome: Met This Shift     Problem: Discharge Planning:  Goal: Patients continuum of care needs are met  Description  Patients continuum of care needs are met  Outcome: Ongoing     Problem: Nutrition  Goal: Optimal nutrition therapy  Outcome: Ongoing     Problem: Skin Integrity:  Goal: Will show no infection signs and
Problem: Falls - Risk of:  Goal: Will remain free from falls  Description  Will remain free from falls  Outcome: Ongoing  Goal: Absence of physical injury  Description  Absence of physical injury  Outcome: Ongoing     Problem: Pain:  Goal: Pain level will decrease  Description  Pain level will decrease  Outcome: Ongoing  Goal: Control of acute pain  Description  Control of acute pain  Outcome: Ongoing  Goal: Control of chronic pain  Description  Control of chronic pain  Outcome: Ongoing     Problem: Infection:  Goal: Will remain free from infection  Description  Will remain free from infection  Outcome: Ongoing     Problem: Safety:  Goal: Free from accidental physical injury  Description  Free from accidental physical injury  Outcome: Ongoing  Goal: Free from intentional harm  Description  Free from intentional harm  Outcome: Ongoing     Problem: Daily Care:  Goal: Daily care needs are met  Description  Daily care needs are met  Outcome: Ongoing     Problem: Discharge Planning:  Goal: Patients continuum of care needs are met  Description  Patients continuum of care needs are met  Outcome: Ongoing     Problem: Nutrition  Goal: Optimal nutrition therapy  Outcome: Ongoing
Nevaeh Turner RN  Outcome: Ongoing  9/6/2019 0402 by Nead Gooden RN  Outcome: Ongoing

## 2019-09-12 VITALS
OXYGEN SATURATION: 98 % | WEIGHT: 240 LBS | HEART RATE: 81 BPM | DIASTOLIC BLOOD PRESSURE: 46 MMHG | TEMPERATURE: 98.2 F | RESPIRATION RATE: 16 BRPM | BODY MASS INDEX: 31.81 KG/M2 | SYSTOLIC BLOOD PRESSURE: 131 MMHG | HEIGHT: 73 IN

## 2019-09-12 LAB
GLUCOSE BLD-MCNC: 131 MG/DL (ref 75–110)
GLUCOSE BLD-MCNC: 177 MG/DL (ref 75–110)

## 2019-09-12 PROCEDURE — 6370000000 HC RX 637 (ALT 250 FOR IP): Performed by: STUDENT IN AN ORGANIZED HEALTH CARE EDUCATION/TRAINING PROGRAM

## 2019-09-12 PROCEDURE — 6370000000 HC RX 637 (ALT 250 FOR IP): Performed by: PHYSICIAN ASSISTANT

## 2019-09-12 PROCEDURE — 99239 HOSP IP/OBS DSCHRG MGMT >30: CPT | Performed by: INTERNAL MEDICINE

## 2019-09-12 PROCEDURE — 6370000000 HC RX 637 (ALT 250 FOR IP): Performed by: FAMILY MEDICINE

## 2019-09-12 PROCEDURE — 2580000003 HC RX 258: Performed by: PHYSICIAN ASSISTANT

## 2019-09-12 PROCEDURE — 82947 ASSAY GLUCOSE BLOOD QUANT: CPT

## 2019-09-12 PROCEDURE — 97110 THERAPEUTIC EXERCISES: CPT

## 2019-09-12 PROCEDURE — 6360000002 HC RX W HCPCS: Performed by: PHYSICIAN ASSISTANT

## 2019-09-12 PROCEDURE — 97530 THERAPEUTIC ACTIVITIES: CPT

## 2019-09-12 PROCEDURE — 51798 US URINE CAPACITY MEASURE: CPT

## 2019-09-12 RX ORDER — TAMSULOSIN HYDROCHLORIDE 0.4 MG/1
0.4 CAPSULE ORAL DAILY
Qty: 30 CAPSULE | Refills: 3 | Status: SHIPPED | OUTPATIENT
Start: 2019-09-12

## 2019-09-12 RX ORDER — HYDROCODONE BITARTRATE AND ACETAMINOPHEN 5; 325 MG/1; MG/1
1 TABLET ORAL EVERY 8 HOURS PRN
Qty: 15 TABLET | Refills: 0 | Status: SHIPPED | OUTPATIENT
Start: 2019-09-12 | End: 2019-09-19

## 2019-09-12 RX ADMIN — TAMSULOSIN HYDROCHLORIDE 0.4 MG: 0.4 CAPSULE ORAL at 10:43

## 2019-09-12 RX ADMIN — LISINOPRIL AND HYDROCHLOROTHIAZIDE 1 TABLET: 12.5; 1 TABLET ORAL at 10:43

## 2019-09-12 RX ADMIN — SODIUM CHLORIDE, PRESERVATIVE FREE 10 ML: 5 INJECTION INTRAVENOUS at 10:44

## 2019-09-12 RX ADMIN — HYDROCODONE BITARTRATE AND ACETAMINOPHEN 1 TABLET: 5; 325 TABLET ORAL at 06:12

## 2019-09-12 RX ADMIN — ENOXAPARIN SODIUM 40 MG: 40 INJECTION SUBCUTANEOUS at 10:42

## 2019-09-12 RX ADMIN — INSULIN GLARGINE 30 UNITS: 100 INJECTION, SOLUTION SUBCUTANEOUS at 10:42

## 2019-09-12 RX ADMIN — POLYETHYLENE GLYCOL 3350 17 G: 17 POWDER, FOR SOLUTION ORAL at 10:43

## 2019-09-12 RX ADMIN — LACTULOSE 30 G: 20 SOLUTION ORAL at 10:42

## 2019-09-12 RX ADMIN — INSULIN LISPRO 2 UNITS: 100 INJECTION, SOLUTION INTRAVENOUS; SUBCUTANEOUS at 14:20

## 2019-09-12 ASSESSMENT — PAIN SCALES - GENERAL: PAINLEVEL_OUTOF10: 10

## 2019-09-12 NOTE — PROGRESS NOTES
Report called to [facility], all questoins addressed.

## 2019-09-12 NOTE — PROGRESS NOTES
[address]: Minimal assistance  Quality of Gait: flexed posture  Gait Deviations: Shuffles;Decreased step height;Decreased step length; Slow Marisol  Distance: 12ft  Comments: Distance limited d/t back pain, pt demos impulsive/unsafe behabior  Stairs/Curb  Stairs?: No     Balance  Posture: Fair  Sitting - Static: Fair;+  Sitting - Dynamic: Fair;-  Standing - Static: Fair;-  Standing - Dynamic: Fair;-  Comments: pt sat EOB ~5mins SBA, pt leaning on L elbow most of time, pt notes that leaning on L side helps with back pain  Exercises  Supine Exercises:  Ankle Pumps, Gluteal sets, Heel Slides, Hip ABD/ADD, Hip IR/ER, Quad Sets, SAQ,  Reps x 10                  Goals  Short term goals  Time Frame for Short term goals: 14 visits  Short term goal 1: Mod I for bed mobility  Short term goal 2: Perform sit to stand transfer with CGA  Short term goal 3: Ambulate 100ft with least restrictive AD and CGA  Short term goal 4: Demo Fair dynamic standing balance to decrease risk of falls  Short term goal 5: Participate in 30 minutes of therapy to demo increased activity tolerance    Plan    Plan  Times per week: 5x/wk  Current Treatment Recommendations: Strengthening, Balance Training, Transfer Training, Functional Mobility Training, Endurance Training, Patient/Caregiver Education & Training, Safety Education & Training, Gait Training, Stair training, ROM  Safety Devices  Type of devices: Call light within reach, Nurse notified, Gait belt, Patient at risk for falls, Left in bed, All fall risk precautions in place  Restraints  Initially in place: No     Therapy Time   Individual Concurrent Group Co-treatment   Time In 0939         Time Out 1004         Minutes 25                 Charo Mendoza, PTA

## 2019-09-12 NOTE — PROGRESS NOTES
Hospital Problems           Last Modified POA    * (Principal) Lumbar radiculopathy 9/6/2019 Yes    Intractable back pain 9/6/2019 Yes    Hypertension 9/6/2019 Yes    Diabetes mellitus ([address] 9/6/2019 Yes    H/O: CVA (cerebrovascular accident) 9/6/2019 Yes    Urinary retention 9/7/2019 Yes    Fecal incontinence 9/7/2019 Yes          Plan:        - Neurosurgery consulted - no acute intervention  - Urology following - continue Flomax, PVRs  - MRI reviewed - multilevel lumbar spondylosis results in mild L2-L3 and L3-L4 spinal canal stenosis.  Multilevel mild-to-moderate neural foraminal narrowing is most pronounced at L4-L5 and L5-S1 on the right  - Labs and medications reviewed   - Continue lantus 30 BID, last A1c 7.0 (9/6)   - Continue correction scale   - Pain control  - PT/OT evaluation  - DC planning - working on placement - awaiting precert   - DC today        Adiel Perdomo MD  9/12/2019  9:14 AM

## 2019-09-19 ENCOUNTER — HOSPITAL ENCOUNTER (OUTPATIENT)
Age: 74
Setting detail: SPECIMEN
Discharge: HOME | End: 2019-09-19
Payer: MEDICARE

## 2019-09-19 LAB
-: ABNORMAL
AMORPHOUS: ABNORMAL
BACTERIA: ABNORMAL
BILIRUBIN URINE: NEGATIVE
CASTS UA: ABNORMAL /LPF
CASTS UA: ABNORMAL /LPF
COLOR: YELLOW
COMMENT UA: ABNORMAL
CRYSTALS, UA: ABNORMAL /HPF
EPITHELIAL CELLS UA: ABNORMAL /HPF (ref 0–5)
GLUCOSE URINE: NEGATIVE
KETONES, URINE: NEGATIVE
LEUKOCYTE ESTERASE, URINE: ABNORMAL
MUCUS: ABNORMAL
NITRITE, URINE: NEGATIVE
OTHER OBSERVATIONS UA: ABNORMAL
PH UA: 8 (ref 5–9)
PROTEIN UA: ABNORMAL
RBC UA: ABNORMAL /HPF (ref 0–2)
RENAL EPITHELIAL, UA: ABNORMAL /HPF
SPECIFIC GRAVITY UA: 1.02 (ref 1.01–1.02)
TRICHOMONAS: ABNORMAL
TURBIDITY: ABNORMAL
URINE HGB: NEGATIVE
UROBILINOGEN, URINE: NORMAL
WBC UA: ABNORMAL /HPF (ref 0–5)
YEAST: ABNORMAL

## 2019-09-19 PROCEDURE — 81001 URINALYSIS AUTO W/SCOPE: CPT

## 2019-09-19 PROCEDURE — 86403 PARTICLE AGGLUT ANTBDY SCRN: CPT

## 2019-09-19 PROCEDURE — 87086 URINE CULTURE/COLONY COUNT: CPT

## 2019-09-19 PROCEDURE — 87186 SC STD MICRODIL/AGAR DIL: CPT

## 2019-09-20 ENCOUNTER — APPOINTMENT (OUTPATIENT)
Dept: CT IMAGING | Age: 74
End: 2019-09-20
Payer: COMMERCIAL

## 2019-09-20 ENCOUNTER — HOSPITAL ENCOUNTER (EMERGENCY)
Age: 74
Discharge: HOME | End: 2019-09-20
Attending: FAMILY MEDICINE
Payer: COMMERCIAL

## 2019-09-20 VITALS
DIASTOLIC BLOOD PRESSURE: 64 MMHG | HEART RATE: 86 BPM | TEMPERATURE: 97.5 F | OXYGEN SATURATION: 99 % | RESPIRATION RATE: 20 BRPM | SYSTOLIC BLOOD PRESSURE: 124 MMHG

## 2019-09-20 DIAGNOSIS — R41.82 ALTERED MENTAL STATUS, UNSPECIFIED ALTERED MENTAL STATUS TYPE: Primary | ICD-10-CM

## 2019-09-20 LAB
-: NORMAL
ALBUMIN SERPL-MCNC: 3 G/DL (ref 3.5–5.2)
ALBUMIN/GLOBULIN RATIO: 0.9 (ref 1–2.5)
ALP BLD-CCNC: 74 U/L (ref 40–129)
ALT SERPL-CCNC: 18 U/L (ref 5–41)
AMORPHOUS: NORMAL
ANION GAP SERPL CALCULATED.3IONS-SCNC: 14 MMOL/L (ref 9–17)
AST SERPL-CCNC: 14 U/L
BACTERIA: NORMAL
BILIRUB SERPL-MCNC: 0.63 MG/DL (ref 0.3–1.2)
BILIRUBIN URINE: NEGATIVE
BUN BLDV-MCNC: 36 MG/DL (ref 8–23)
BUN/CREAT BLD: 27 (ref 9–20)
CALCIUM SERPL-MCNC: 9.1 MG/DL (ref 8.6–10.4)
CASTS UA: NORMAL /LPF
CHLORIDE BLD-SCNC: 99 MMOL/L (ref 98–107)
CO2: 28 MMOL/L (ref 20–31)
COLOR: YELLOW
COMMENT UA: ABNORMAL
CREAT SERPL-MCNC: 1.33 MG/DL (ref 0.7–1.2)
CRYSTALS, UA: NORMAL /HPF
EPITHELIAL CELLS UA: NORMAL /HPF (ref 0–5)
GFR AFRICAN AMERICAN: >60 ML/MIN
GFR NON-AFRICAN AMERICAN: 53 ML/MIN
GFR SERPL CREATININE-BSD FRML MDRD: ABNORMAL ML/MIN/{1.73_M2}
GFR SERPL CREATININE-BSD FRML MDRD: ABNORMAL ML/MIN/{1.73_M2}
GLUCOSE BLD-MCNC: 131 MG/DL (ref 70–99)
GLUCOSE BLD-MCNC: 98 MG/DL (ref 74–100)
GLUCOSE URINE: NEGATIVE
HCT VFR BLD CALC: 33.7 % (ref 40.7–50.3)
HEMOGLOBIN: 10.9 G/DL (ref 13–17)
KETONES, URINE: NEGATIVE
LEUKOCYTE ESTERASE, URINE: ABNORMAL
MCH RBC QN AUTO: 31.6 PG (ref 25.2–33.5)
MCHC RBC AUTO-ENTMCNC: 32.3 G/DL (ref 28.4–34.8)
MCV RBC AUTO: 97.7 FL (ref 82.6–102.9)
MUCUS: NORMAL
NITRITE, URINE: NEGATIVE
NRBC AUTOMATED: 0 PER 100 WBC
OTHER OBSERVATIONS UA: NORMAL
PDW BLD-RTO: 12.8 % (ref 11.8–14.4)
PH UA: 7 (ref 5–9)
PLATELET # BLD: 296 K/UL (ref 138–453)
PMV BLD AUTO: 9.8 FL (ref 8.1–13.5)
POTASSIUM SERPL-SCNC: 3.9 MMOL/L (ref 3.7–5.3)
PROTEIN UA: ABNORMAL
RBC # BLD: 3.45 M/UL (ref 4.21–5.77)
RBC UA: NORMAL /HPF (ref 0–2)
RENAL EPITHELIAL, UA: NORMAL /HPF
SODIUM BLD-SCNC: 141 MMOL/L (ref 135–144)
SPECIFIC GRAVITY UA: 1.02 (ref 1.01–1.02)
TOTAL PROTEIN: 6.3 G/DL (ref 6.4–8.3)
TRICHOMONAS: NORMAL
TURBIDITY: CLEAR
URINE HGB: ABNORMAL
UROBILINOGEN, URINE: NORMAL
WBC # BLD: 9 K/UL (ref 3.5–11.3)
WBC UA: NORMAL /HPF (ref 0–5)
YEAST: NORMAL

## 2019-09-20 PROCEDURE — 82947 ASSAY GLUCOSE BLOOD QUANT: CPT

## 2019-09-20 PROCEDURE — 36415 COLL VENOUS BLD VENIPUNCTURE: CPT

## 2019-09-20 PROCEDURE — 85027 COMPLETE CBC AUTOMATED: CPT

## 2019-09-20 PROCEDURE — 80053 COMPREHEN METABOLIC PANEL: CPT

## 2019-09-20 PROCEDURE — 2580000003 HC RX 258: Performed by: FAMILY MEDICINE

## 2019-09-20 PROCEDURE — 99285 EMERGENCY DEPT VISIT HI MDM: CPT

## 2019-09-20 PROCEDURE — 70450 CT HEAD/BRAIN W/O DYE: CPT

## 2019-09-20 PROCEDURE — 81001 URINALYSIS AUTO W/SCOPE: CPT

## 2019-09-20 RX ORDER — BISACODYL 10 MG
10 SUPPOSITORY, RECTAL RECTAL DAILY PRN
COMMUNITY

## 2019-09-20 RX ORDER — GLIMEPIRIDE 4 MG/1
2 TABLET ORAL 2 TIMES DAILY
COMMUNITY
End: 2020-01-01 | Stop reason: DRUGHIGH

## 2019-09-20 RX ORDER — 0.9 % SODIUM CHLORIDE 0.9 %
1000 INTRAVENOUS SOLUTION INTRAVENOUS ONCE
Status: COMPLETED | OUTPATIENT
Start: 2019-09-20 | End: 2019-09-20

## 2019-09-20 RX ORDER — GABAPENTIN 300 MG/1
300 CAPSULE ORAL 3 TIMES DAILY
COMMUNITY

## 2019-09-20 RX ADMIN — SODIUM CHLORIDE 1000 ML: 9 INJECTION, SOLUTION INTRAVENOUS at 10:51

## 2019-09-20 ASSESSMENT — PAIN DESCRIPTION - PAIN TYPE: TYPE: CHRONIC PAIN

## 2019-09-20 ASSESSMENT — PAIN SCALES - GENERAL: PAINLEVEL_OUTOF10: 3

## 2019-09-20 ASSESSMENT — ENCOUNTER SYMPTOMS
EYES NEGATIVE: 1
BACK PAIN: 1
GASTROINTESTINAL NEGATIVE: 1
ALLERGIC/IMMUNOLOGIC NEGATIVE: 1

## 2019-09-20 NOTE — ED PROVIDER NOTES
Psychiatric/Behavioral:        Confusion         PAST MEDICAL HISTORY     Past Medical History:   Diagnosis Date    BPH (benign prostatic hyperplasia)     Cerebral artery occlusion with cerebral infarction ([address]     Depression     Diabetes mellitus (HCC)     Dorsalgia     Hypertension     Incontinence of feces     Lumbar radiculopathy     TIA (transient ischemic attack)     Urinary retention          SURGICALHISTORY     History reviewed. No pertinent surgical history. CURRENT MEDICATIONS       Previous Medications    ACETAMINOPHEN (TYLENOL) 500 MG TABLET    Take 1,000 mg by mouth 3 times daily as needed for Pain    BISACODYL (DULCOLAX) 10 MG SUPPOSITORY    Place 10 mg rectally daily as needed for Constipation    DOCUSATE SODIUM (COLACE) 100 MG CAPSULE    Take 100 mg by mouth daily     GABAPENTIN (NEURONTIN) 300 MG CAPSULE    Take 300 mg by mouth 3 times daily. GLIMEPIRIDE (AMARYL) 4 MG TABLET    Take 4 mg by mouth 2 times daily    INSULIN GLARGINE (LANTUS) 100 UNIT/ML INJECTION VIAL    Inject 30 Units into the skin 2 times daily     LISINOPRIL-HYDROCHLOROTHIAZIDE (PRINZIDE;ZESTORETIC) 10-12.5 MG PER TABLET    Take 1 tablet by mouth daily    METFORMIN (GLUCOPHAGE) 500 MG TABLET    Take 500 mg by mouth 2 times daily (with meals)    OXYCODONE-ACETAMINOPHEN (PERCOCET) 5-325 MG PER TABLET    Take 1 tablet by mouth every 6 hours as needed for Pain. SERTRALINE (ZOLOFT) 50 MG TABLET    Take 50 mg by mouth daily    TAMSULOSIN (FLOMAX) 0.4 MG CAPSULE    Take 1 capsule by mouth daily    VERAPAMIL (CALAN SR) 240 MG EXTENDED RELEASE TABLET    Take 240 mg by mouth nightly         ALLERGIES   Patient has no known allergies. FAMILY HISTORY     History reviewed. No pertinent family history.        SOCIAL HISTORY       Social History     Socioeconomic History    Marital status:      Spouse name: None    Number of children: None    Years of education: None    Highest education level: None cardiologist.      RADIOLOGY:   plain film images such as CT, Ultrasound and MRI are read by the radiologist. Plain radiographic images are visualized and preliminarily interpreted by the emergency physician with the below findings:    Interpretation per the Radiologist below, if available at the time of this note:    CT Head WO Contrast   Final Result   No acute intracranial abnormality. LABS:  Labs Reviewed   CBC - Abnormal; Notable for the following components:       Result Value    RBC 3.45 (*)     Hemoglobin 10.9 (*)     Hematocrit 33.7 (*)     All other components within normal limits   COMPREHENSIVE METABOLIC PANEL W/ REFLEX TO MG FOR LOW K - Abnormal; Notable for the following components:    Glucose 131 (*)     BUN 36 (*)     CREATININE 1.33 (*)     Bun/Cre Ratio 27 (*)     Total Protein 6.3 (*)     Alb 3.0 (*)     Albumin/Globulin Ratio 0.9 (*)     GFR Non- 53 (*)     All other components within normal limits   URINALYSIS - Abnormal; Notable for the following components:    Urine Hgb TRACE (*)     Protein, UA 4+ (*)     Leukocyte Esterase, Urine SMALL (*)     All other components within normal limits   GLUCOSE, WHOLE BLOOD   MICROSCOPIC URINALYSIS       All other labs were within normal range or not returned as of this dictation. EMERGENCY DEPARTMENT COURSE and DIFFERENTIAL DIAGNOSIS/MDM:   Vitals:    Vitals:    09/20/19 0946 09/20/19 1001 09/20/19 1017 09/20/19 1046   BP: 111/66 (!) 95/59 (!) 105/54 (!) 142/67   Pulse:       Resp:       Temp:       TempSrc:       SpO2: 98% 99%           MDM     CRITICAL CARE TIME   Total CriticalCare time was 0 minutes, excluding separately reportable procedures. PROCEDURES:  Unlessotherwise noted below, none     Procedures    FINAL IMPRESSION      1.  Altered mental status, unspecified altered mental status type          DISPOSITION/PLAN   DISPOSITION Decision To Discharge 09/20/2019 12:18:20 PM      PATIENT REFERRED TO:  No follow-up

## 2019-09-22 LAB
CULTURE: ABNORMAL
Lab: ABNORMAL
SPECIMEN DESCRIPTION: ABNORMAL

## 2019-09-26 ENCOUNTER — HOSPITAL ENCOUNTER (OUTPATIENT)
Age: 74
Setting detail: SPECIMEN
Discharge: HOME | End: 2019-09-26
Payer: MEDICARE

## 2019-09-26 LAB
ALBUMIN SERPL-MCNC: 3 G/DL (ref 3.5–5.2)
ALBUMIN/GLOBULIN RATIO: 0.9 (ref 1–2.5)
ALP BLD-CCNC: 69 U/L (ref 40–129)
ALT SERPL-CCNC: 16 U/L (ref 5–41)
AMYLASE: 87 U/L (ref 28–100)
ANION GAP SERPL CALCULATED.3IONS-SCNC: 16 MMOL/L (ref 9–17)
AST SERPL-CCNC: 18 U/L
BILIRUB SERPL-MCNC: 0.47 MG/DL (ref 0.3–1.2)
BUN BLDV-MCNC: 22 MG/DL (ref 8–23)
BUN/CREAT BLD: 20 (ref 9–20)
CALCIUM SERPL-MCNC: 9.4 MG/DL (ref 8.6–10.4)
CHLORIDE BLD-SCNC: 105 MMOL/L (ref 98–107)
CO2: 26 MMOL/L (ref 20–31)
CREAT SERPL-MCNC: 1.1 MG/DL (ref 0.7–1.2)
GFR AFRICAN AMERICAN: >60 ML/MIN
GFR NON-AFRICAN AMERICAN: >60 ML/MIN
GFR SERPL CREATININE-BSD FRML MDRD: ABNORMAL ML/MIN/{1.73_M2}
GFR SERPL CREATININE-BSD FRML MDRD: ABNORMAL ML/MIN/{1.73_M2}
GLUCOSE BLD-MCNC: 57 MG/DL (ref 70–99)
HCT VFR BLD CALC: 33.4 % (ref 40.7–50.3)
HEMOGLOBIN: 10.9 G/DL (ref 13–17)
LIPASE: 48 U/L (ref 13–60)
MCH RBC QN AUTO: 31.1 PG (ref 25.2–33.5)
MCHC RBC AUTO-ENTMCNC: 32.6 G/DL (ref 28.4–34.8)
MCV RBC AUTO: 95.4 FL (ref 82.6–102.9)
NRBC AUTOMATED: 0 PER 100 WBC
PDW BLD-RTO: 12.5 % (ref 11.8–14.4)
PLATELET # BLD: 283 K/UL (ref 138–453)
PMV BLD AUTO: 10.1 FL (ref 8.1–13.5)
POTASSIUM SERPL-SCNC: 4.5 MMOL/L (ref 3.7–5.3)
RBC # BLD: 3.5 M/UL (ref 4.21–5.77)
SODIUM BLD-SCNC: 147 MMOL/L (ref 135–144)
TOTAL PROTEIN: 6.5 G/DL (ref 6.4–8.3)
WBC # BLD: 9.7 K/UL (ref 3.5–11.3)

## 2019-09-26 PROCEDURE — 36415 COLL VENOUS BLD VENIPUNCTURE: CPT

## 2019-09-26 PROCEDURE — 85027 COMPLETE CBC AUTOMATED: CPT

## 2019-09-26 PROCEDURE — 82150 ASSAY OF AMYLASE: CPT

## 2019-09-26 PROCEDURE — P9604 ONE-WAY ALLOW PRORATED TRIP: HCPCS

## 2019-09-26 PROCEDURE — 83690 ASSAY OF LIPASE: CPT

## 2019-09-26 PROCEDURE — 80053 COMPREHEN METABOLIC PANEL: CPT

## 2019-09-30 ENCOUNTER — OUTSIDE SERVICES (OUTPATIENT)
Dept: FAMILY MEDICINE CLINIC | Age: 74
End: 2019-09-30

## 2019-09-30 DIAGNOSIS — M54.9 INTRACTABLE BACK PAIN: Primary | ICD-10-CM

## 2019-09-30 DIAGNOSIS — I10 ESSENTIAL HYPERTENSION: ICD-10-CM

## 2019-09-30 DIAGNOSIS — Z79.4 TYPE 2 DIABETES MELLITUS WITH COMPLICATION, WITH LONG-TERM CURRENT USE OF INSULIN (HCC): ICD-10-CM

## 2019-09-30 DIAGNOSIS — E11.8 TYPE 2 DIABETES MELLITUS WITH COMPLICATION, WITH LONG-TERM CURRENT USE OF INSULIN (HCC): ICD-10-CM

## 2019-09-30 DIAGNOSIS — M54.16 LUMBAR RADICULOPATHY: ICD-10-CM

## 2019-10-01 ENCOUNTER — OUTSIDE SERVICES (OUTPATIENT)
Dept: FAMILY MEDICINE CLINIC | Age: 74
End: 2019-10-01

## 2019-10-01 DIAGNOSIS — M54.9 INTRACTABLE BACK PAIN: Primary | ICD-10-CM

## 2019-10-01 DIAGNOSIS — E11.8 TYPE 2 DIABETES MELLITUS WITH COMPLICATION, WITH LONG-TERM CURRENT USE OF INSULIN (HCC): ICD-10-CM

## 2019-10-01 DIAGNOSIS — I10 ESSENTIAL HYPERTENSION: ICD-10-CM

## 2019-10-01 DIAGNOSIS — Z79.4 TYPE 2 DIABETES MELLITUS WITH COMPLICATION, WITH LONG-TERM CURRENT USE OF INSULIN (HCC): ICD-10-CM

## 2019-10-01 DIAGNOSIS — M54.16 LUMBAR RADICULOPATHY: ICD-10-CM

## 2019-10-02 ENCOUNTER — HOSPITAL ENCOUNTER (OUTPATIENT)
Age: 74
Setting detail: SPECIMEN
Discharge: HOME | End: 2019-10-02
Payer: COMMERCIAL

## 2019-10-02 PROCEDURE — 87449 NOS EACH ORGANISM AG IA: CPT

## 2019-10-02 PROCEDURE — 87324 CLOSTRIDIUM AG IA: CPT

## 2019-10-02 PROCEDURE — 87493 C DIFF AMPLIFIED PROBE: CPT

## 2019-10-03 ENCOUNTER — HOSPITAL ENCOUNTER (OUTPATIENT)
Age: 74
Setting detail: SPECIMEN
Discharge: HOME | End: 2019-10-03
Payer: COMMERCIAL

## 2019-10-03 LAB
-: ABNORMAL
AMORPHOUS: ABNORMAL
BACTERIA: ABNORMAL
BILIRUBIN URINE: NEGATIVE
CASTS UA: ABNORMAL /LPF
COLOR: YELLOW
COMMENT UA: ABNORMAL
CRYSTALS, UA: ABNORMAL /HPF
EPITHELIAL CELLS UA: ABNORMAL /HPF (ref 0–5)
GLUCOSE URINE: ABNORMAL
KETONES, URINE: NEGATIVE
LEUKOCYTE ESTERASE, URINE: NEGATIVE
MUCUS: ABNORMAL
NITRITE, URINE: NEGATIVE
OTHER OBSERVATIONS UA: ABNORMAL
PH UA: 5 (ref 5–9)
PROTEIN UA: ABNORMAL
RBC UA: ABNORMAL /HPF (ref 0–2)
RENAL EPITHELIAL, UA: ABNORMAL /HPF
SPECIFIC GRAVITY UA: >1.03 (ref 1.01–1.02)
TRICHOMONAS: ABNORMAL
TURBIDITY: CLEAR
URINE HGB: NEGATIVE
UROBILINOGEN, URINE: NORMAL
WBC UA: ABNORMAL /HPF (ref 0–5)
YEAST: ABNORMAL

## 2019-10-03 PROCEDURE — 81001 URINALYSIS AUTO W/SCOPE: CPT

## 2019-10-04 LAB
C DIFF AG + TOXIN: ABNORMAL
SPECIMEN DESCRIPTION: ABNORMAL

## 2019-10-05 LAB
C DIFFICILE TOXINS, PCR: NORMAL
SPECIMEN DESCRIPTION: NORMAL

## 2019-10-07 ENCOUNTER — HOSPITAL ENCOUNTER (OUTPATIENT)
Age: 74
Setting detail: SPECIMEN
Discharge: HOME | End: 2019-10-07
Payer: COMMERCIAL

## 2019-10-07 LAB
-: ABNORMAL
AMORPHOUS: ABNORMAL
BACTERIA: ABNORMAL
BILIRUBIN URINE: NEGATIVE
CASTS UA: ABNORMAL /LPF
COLOR: YELLOW
COMMENT UA: ABNORMAL
CRYSTALS, UA: ABNORMAL /HPF
EPITHELIAL CELLS UA: ABNORMAL /HPF (ref 0–5)
GLUCOSE URINE: ABNORMAL
KETONES, URINE: NEGATIVE
LEUKOCYTE ESTERASE, URINE: NEGATIVE
MUCUS: ABNORMAL
NITRITE, URINE: NEGATIVE
OTHER OBSERVATIONS UA: ABNORMAL
PH UA: 5 (ref 5–9)
PROTEIN UA: ABNORMAL
RBC UA: ABNORMAL /HPF (ref 0–2)
RENAL EPITHELIAL, UA: ABNORMAL /HPF
SPECIFIC GRAVITY UA: >1.03 (ref 1.01–1.02)
TRICHOMONAS: ABNORMAL
TURBIDITY: CLEAR
URINE HGB: ABNORMAL
UROBILINOGEN, URINE: NORMAL
WBC UA: ABNORMAL /HPF (ref 0–5)
YEAST: ABNORMAL

## 2019-10-07 PROCEDURE — 81001 URINALYSIS AUTO W/SCOPE: CPT

## 2019-10-15 PROBLEM — M47.817 SPONDYLOSIS OF LUMBOSACRAL REGION WITHOUT MYELOPATHY OR RADICULOPATHY: Chronic | Status: ACTIVE | Noted: 2019-01-01

## 2019-10-16 PROBLEM — N13.8 BPH WITH OBSTRUCTION/LOWER URINARY TRACT SYMPTOMS: Status: ACTIVE | Noted: 2019-01-01

## 2019-10-16 PROBLEM — N31.9 NEUROGENIC BLADDER: Status: ACTIVE | Noted: 2019-01-01

## 2019-10-16 PROBLEM — K59.00 CONSTIPATION: Status: ACTIVE | Noted: 2019-01-01

## 2019-10-16 PROBLEM — N40.1 BPH WITH OBSTRUCTION/LOWER URINARY TRACT SYMPTOMS: Status: ACTIVE | Noted: 2019-01-01

## 2020-01-01 ENCOUNTER — TELEPHONE (OUTPATIENT)
Dept: UROLOGY | Age: 75
End: 2020-01-01

## 2020-01-01 ENCOUNTER — VIRTUAL VISIT (OUTPATIENT)
Dept: UROLOGY | Age: 75
End: 2020-01-01
Payer: MEDICARE

## 2020-01-01 ENCOUNTER — HOSPITAL ENCOUNTER (OUTPATIENT)
Dept: INFUSION THERAPY | Age: 75
End: 2020-01-01

## 2020-01-01 ENCOUNTER — HOSPITAL ENCOUNTER (OUTPATIENT)
Dept: GENERAL RADIOLOGY | Age: 75
Discharge: HOME | End: 2020-06-24
Payer: MEDICARE

## 2020-01-01 ENCOUNTER — OFFICE VISIT (OUTPATIENT)
Dept: NEUROLOGY | Age: 75
End: 2020-01-01
Payer: MEDICARE

## 2020-01-01 ENCOUNTER — HOSPITAL ENCOUNTER (OUTPATIENT)
Age: 75
Setting detail: SPECIMEN
Discharge: HOME | End: 2020-03-30
Payer: COMMERCIAL

## 2020-01-01 ENCOUNTER — HOSPITAL ENCOUNTER (OUTPATIENT)
Age: 75
Setting detail: SPECIMEN
Discharge: HOME | End: 2020-02-11
Payer: COMMERCIAL

## 2020-01-01 ENCOUNTER — OFFICE VISIT (OUTPATIENT)
Dept: UROLOGY | Age: 75
End: 2020-01-01
Payer: MEDICARE

## 2020-01-01 ENCOUNTER — HOSPITAL ENCOUNTER (OUTPATIENT)
Dept: NON INVASIVE DIAGNOSTICS | Age: 75
Discharge: HOME | DRG: 682 | End: 2020-09-25
Payer: MEDICARE

## 2020-01-01 ENCOUNTER — HOSPITAL ENCOUNTER (OUTPATIENT)
Age: 75
Setting detail: SPECIMEN
Discharge: HOME | End: 2020-09-02
Payer: MEDICARE

## 2020-01-01 ENCOUNTER — HOSPITAL ENCOUNTER (OUTPATIENT)
Age: 75
Setting detail: SPECIMEN
Discharge: HOME | End: 2020-05-05
Payer: MEDICARE

## 2020-01-01 ENCOUNTER — HOSPITAL ENCOUNTER (OUTPATIENT)
Dept: CT IMAGING | Age: 75
Discharge: HOME | End: 2020-06-13
Payer: MEDICARE

## 2020-01-01 ENCOUNTER — APPOINTMENT (OUTPATIENT)
Dept: GENERAL RADIOLOGY | Age: 75
DRG: 682 | End: 2020-01-01
Payer: MEDICARE

## 2020-01-01 ENCOUNTER — APPOINTMENT (OUTPATIENT)
Dept: CT IMAGING | Age: 75
DRG: 682 | End: 2020-01-01
Payer: MEDICARE

## 2020-01-01 ENCOUNTER — HOSPITAL ENCOUNTER (OUTPATIENT)
Dept: MRI IMAGING | Age: 75
Discharge: HOME | End: 2020-06-24
Payer: MEDICARE

## 2020-01-01 ENCOUNTER — APPOINTMENT (OUTPATIENT)
Dept: ULTRASOUND IMAGING | Age: 75
DRG: 682 | End: 2020-01-01
Payer: MEDICARE

## 2020-01-01 ENCOUNTER — HOSPITAL ENCOUNTER (INPATIENT)
Age: 75
LOS: 7 days | Discharge: NURSING, REHAB OR LONG-TERM CARE | DRG: 682 | End: 2020-10-01
Attending: EMERGENCY MEDICINE | Admitting: INTERNAL MEDICINE
Payer: MEDICARE

## 2020-01-01 ENCOUNTER — HOSPITAL ENCOUNTER (OUTPATIENT)
Age: 75
Discharge: HOME | End: 2020-06-04
Payer: MEDICARE

## 2020-01-01 ENCOUNTER — HOSPITAL ENCOUNTER (OUTPATIENT)
Age: 75
Setting detail: SPECIMEN
Discharge: HOME | End: 2020-06-03
Payer: MEDICARE

## 2020-01-01 ENCOUNTER — HOSPITAL ENCOUNTER (OUTPATIENT)
Age: 75
Setting detail: SPECIMEN
Discharge: HOME | End: 2020-05-01
Payer: MEDICARE

## 2020-01-01 VITALS
SYSTOLIC BLOOD PRESSURE: 128 MMHG | DIASTOLIC BLOOD PRESSURE: 80 MMHG | RESPIRATION RATE: 18 BRPM | HEIGHT: 73 IN | WEIGHT: 200 LBS | HEART RATE: 75 BPM | BODY MASS INDEX: 26.51 KG/M2 | TEMPERATURE: 96.5 F

## 2020-01-01 VITALS
HEIGHT: 73 IN | HEART RATE: 78 BPM | DIASTOLIC BLOOD PRESSURE: 72 MMHG | WEIGHT: 200 LBS | BODY MASS INDEX: 26.51 KG/M2 | SYSTOLIC BLOOD PRESSURE: 132 MMHG | TEMPERATURE: 97.7 F

## 2020-01-01 VITALS
SYSTOLIC BLOOD PRESSURE: 147 MMHG | OXYGEN SATURATION: 91 % | TEMPERATURE: 99 F | BODY MASS INDEX: 32.11 KG/M2 | HEART RATE: 71 BPM | RESPIRATION RATE: 22 BRPM | HEIGHT: 73 IN | DIASTOLIC BLOOD PRESSURE: 59 MMHG | WEIGHT: 242.29 LBS

## 2020-01-01 VITALS
BODY MASS INDEX: 28.76 KG/M2 | HEART RATE: 95 BPM | WEIGHT: 217 LBS | SYSTOLIC BLOOD PRESSURE: 106 MMHG | TEMPERATURE: 97.3 F | DIASTOLIC BLOOD PRESSURE: 57 MMHG | HEIGHT: 73 IN

## 2020-01-01 LAB
-: ABNORMAL
-: ABNORMAL
ABO/RH: NORMAL
ABSOLUTE EOS #: 0 K/UL (ref 0–0.44)
ABSOLUTE EOS #: 0.18 K/UL (ref 0–0.44)
ABSOLUTE EOS #: 0.27 K/UL (ref 0–0.44)
ABSOLUTE EOS #: 0.3 K/UL (ref 0–0.44)
ABSOLUTE EOS #: 0.3 K/UL (ref 0–0.44)
ABSOLUTE IMMATURE GRANULOCYTE: 0 K/UL (ref 0–0.3)
ABSOLUTE IMMATURE GRANULOCYTE: 0 K/UL (ref 0–0.3)
ABSOLUTE IMMATURE GRANULOCYTE: 0.03 K/UL (ref 0–0.3)
ABSOLUTE IMMATURE GRANULOCYTE: 0.04 K/UL (ref 0–0.3)
ABSOLUTE IMMATURE GRANULOCYTE: <0.03 K/UL (ref 0–0.3)
ABSOLUTE LYMPH #: 0.7 K/UL (ref 1.1–3.7)
ABSOLUTE LYMPH #: 0.75 K/UL (ref 1.1–3.7)
ABSOLUTE LYMPH #: 0.75 K/UL (ref 1.1–3.7)
ABSOLUTE LYMPH #: 0.86 K/UL (ref 1.1–3.7)
ABSOLUTE LYMPH #: 1.26 K/UL (ref 1.1–3.7)
ABSOLUTE MONO #: 0.22 K/UL (ref 0.1–1.2)
ABSOLUTE MONO #: 0.91 K/UL (ref 0.1–1.2)
ABSOLUTE MONO #: 1.01 K/UL (ref 0.1–1.2)
ABSOLUTE MONO #: 1.1 K/UL (ref 0.1–1.2)
ABSOLUTE MONO #: 1.12 K/UL (ref 0.1–1.2)
ABSOLUTE RETIC #: 0.04 M/UL (ref 0.03–0.08)
ALBUMIN (CALCULATED): 2.6 G/DL (ref 3.2–5.2)
ALBUMIN (CALCULATED): 2.7 G/DL (ref 3.2–5.2)
ALBUMIN PERCENT: 49 % (ref 45–65)
ALBUMIN PERCENT: 56 % (ref 45–65)
ALBUMIN SERPL-MCNC: 2.5 G/DL (ref 3.5–5.2)
ALBUMIN/GLOBULIN RATIO: 0.9 (ref 1–2.5)
ALP BLD-CCNC: 73 U/L (ref 40–129)
ALPHA 1 PERCENT: 3 % (ref 3–6)
ALPHA 1 PERCENT: 4 % (ref 3–6)
ALPHA 2 PERCENT: 17 % (ref 6–13)
ALPHA 2 PERCENT: 20 % (ref 6–13)
ALPHA-1-GLOBULIN: 0.2 G/DL (ref 0.1–0.4)
ALPHA-1-GLOBULIN: 0.2 G/DL (ref 0.1–0.4)
ALPHA-2-GLOBULIN: 0.8 G/DL (ref 0.5–0.9)
ALPHA-2-GLOBULIN: 1.1 G/DL (ref 0.5–0.9)
ALT SERPL-CCNC: 6 U/L (ref 5–41)
AMMONIA: 19 UMOL/L (ref 16–60)
AMORPHOUS: ABNORMAL
AMORPHOUS: ABNORMAL
ANION GAP SERPL CALCULATED.3IONS-SCNC: 10 MMOL/L (ref 9–17)
ANION GAP SERPL CALCULATED.3IONS-SCNC: 11 MMOL/L (ref 9–17)
ANION GAP SERPL CALCULATED.3IONS-SCNC: 12 MMOL/L (ref 9–17)
ANION GAP SERPL CALCULATED.3IONS-SCNC: 13 MMOL/L (ref 9–17)
ANION GAP SERPL CALCULATED.3IONS-SCNC: 13 MMOL/L (ref 9–17)
ANION GAP SERPL CALCULATED.3IONS-SCNC: 14 MMOL/L (ref 9–17)
ANION GAP SERPL CALCULATED.3IONS-SCNC: 8 MMOL/L (ref 9–17)
ANION GAP SERPL CALCULATED.3IONS-SCNC: 9 MMOL/L (ref 9–17)
ANTIBODY SCREEN: NEGATIVE
ARM BAND NUMBER: NORMAL
AST SERPL-CCNC: 8 U/L
BACTERIA: ABNORMAL
BACTERIA: ABNORMAL
BASOPHILS # BLD: 0 % (ref 0–2)
BASOPHILS # BLD: 1 % (ref 0–2)
BASOPHILS ABSOLUTE: 0 K/UL (ref 0–0.2)
BASOPHILS ABSOLUTE: 0 K/UL (ref 0–0.2)
BASOPHILS ABSOLUTE: 0.03 K/UL (ref 0–0.2)
BASOPHILS ABSOLUTE: <0.03 K/UL (ref 0–0.2)
BASOPHILS ABSOLUTE: <0.03 K/UL (ref 0–0.2)
BETA GLOBULIN: 0.6 G/DL (ref 0.5–1.1)
BETA GLOBULIN: 0.9 G/DL (ref 0.5–1.1)
BETA PERCENT: 11 % (ref 11–19)
BETA PERCENT: 16 % (ref 11–19)
BILIRUB SERPL-MCNC: 0.2 MG/DL (ref 0.3–1.2)
BILIRUBIN URINE: NEGATIVE
BILIRUBIN URINE: NEGATIVE
BLD PROD TYP BPU: NORMAL
BLD PROD TYP BPU: NORMAL
BUN BLDV-MCNC: 27 MG/DL (ref 8–23)
BUN BLDV-MCNC: 32 MG/DL (ref 8–23)
BUN BLDV-MCNC: 33 MG/DL (ref 8–23)
BUN BLDV-MCNC: 35 MG/DL (ref 8–23)
BUN BLDV-MCNC: 39 MG/DL (ref 8–23)
BUN BLDV-MCNC: 39 MG/DL (ref 8–23)
BUN BLDV-MCNC: 42 MG/DL (ref 8–23)
BUN BLDV-MCNC: 45 MG/DL (ref 8–23)
BUN BLDV-MCNC: 46 MG/DL (ref 8–23)
BUN BLDV-MCNC: 56 MG/DL (ref 8–23)
BUN/CREAT BLD: 14 (ref 9–20)
BUN/CREAT BLD: 15 (ref 9–20)
BUN/CREAT BLD: 16 (ref 9–20)
BUN/CREAT BLD: 18 (ref 9–20)
BUN/CREAT BLD: 19 (ref 9–20)
BUN/CREAT BLD: 20 (ref 9–20)
CALCIUM SERPL-MCNC: 8 MG/DL (ref 8.6–10.4)
CALCIUM SERPL-MCNC: 8.1 MG/DL (ref 8.6–10.4)
CALCIUM SERPL-MCNC: 8.1 MG/DL (ref 8.6–10.4)
CALCIUM SERPL-MCNC: 8.2 MG/DL (ref 8.6–10.4)
CALCIUM SERPL-MCNC: 8.2 MG/DL (ref 8.6–10.4)
CALCIUM SERPL-MCNC: 8.3 MG/DL (ref 8.6–10.4)
CALCIUM SERPL-MCNC: 8.4 MG/DL (ref 8.6–10.4)
CALCIUM SERPL-MCNC: 8.9 MG/DL (ref 8.6–10.4)
CASTS UA: ABNORMAL /LPF
CASTS UA: ABNORMAL /LPF
CHLORIDE BLD-SCNC: 100 MMOL/L (ref 98–107)
CHLORIDE BLD-SCNC: 105 MMOL/L (ref 98–107)
CHLORIDE BLD-SCNC: 105 MMOL/L (ref 98–107)
CHLORIDE BLD-SCNC: 106 MMOL/L (ref 98–107)
CHLORIDE BLD-SCNC: 106 MMOL/L (ref 98–107)
CHLORIDE BLD-SCNC: 107 MMOL/L (ref 98–107)
CHLORIDE BLD-SCNC: 109 MMOL/L (ref 98–107)
CHOLESTEROL/HDL RATIO: 3.4
CHOLESTEROL: 121 MG/DL
CO2: 19 MMOL/L (ref 20–31)
CO2: 20 MMOL/L (ref 20–31)
CO2: 22 MMOL/L (ref 20–31)
CO2: 23 MMOL/L (ref 20–31)
CO2: 24 MMOL/L (ref 20–31)
COLOR: YELLOW
COLOR: YELLOW
COMMENT UA: ABNORMAL
COMMENT UA: ABNORMAL
CREAT SERPL-MCNC: 1.37 MG/DL (ref 0.7–1.2)
CREAT SERPL-MCNC: 1.75 MG/DL (ref 0.7–1.2)
CREAT SERPL-MCNC: 2.33 MG/DL (ref 0.7–1.2)
CREAT SERPL-MCNC: 2.35 MG/DL (ref 0.7–1.2)
CREAT SERPL-MCNC: 2.39 MG/DL (ref 0.7–1.2)
CREAT SERPL-MCNC: 2.42 MG/DL (ref 0.7–1.2)
CREAT SERPL-MCNC: 2.43 MG/DL (ref 0.7–1.2)
CREAT SERPL-MCNC: 2.52 MG/DL (ref 0.7–1.2)
CREAT SERPL-MCNC: 3.09 MG/DL (ref 0.7–1.2)
CREAT SERPL-MCNC: 3.12 MG/DL (ref 0.7–1.2)
CREATININE URINE: 60.6 MG/DL (ref 39–259)
CROSSMATCH RESULT: NORMAL
CROSSMATCH RESULT: NORMAL
CRYSTALS, UA: ABNORMAL /HPF
CRYSTALS, UA: ABNORMAL /HPF
CULTURE: ABNORMAL
CULTURE: NORMAL
CULTURE: NORMAL
DIFFERENTIAL TYPE: ABNORMAL
DISPENSE STATUS BLOOD BANK: NORMAL
DISPENSE STATUS BLOOD BANK: NORMAL
EKG ATRIAL RATE: 75 BPM
EKG ATRIAL RATE: 79 BPM
EKG ATRIAL RATE: 82 BPM
EKG P AXIS: 64 DEGREES
EKG P AXIS: 67 DEGREES
EKG P AXIS: 72 DEGREES
EKG P-R INTERVAL: 220 MS
EKG P-R INTERVAL: 226 MS
EKG P-R INTERVAL: 232 MS
EKG Q-T INTERVAL: 424 MS
EKG QRS DURATION: 108 MS
EKG QRS DURATION: 116 MS
EKG QRS DURATION: 120 MS
EKG QTC CALCULATION (BAZETT): 473 MS
EKG QTC CALCULATION (BAZETT): 486 MS
EKG QTC CALCULATION (BAZETT): 495 MS
EKG R AXIS: 21 DEGREES
EKG R AXIS: 39 DEGREES
EKG R AXIS: 60 DEGREES
EKG T AXIS: 82 DEGREES
EKG T AXIS: 85 DEGREES
EKG T AXIS: 93 DEGREES
EKG VENTRICULAR RATE: 75 BPM
EKG VENTRICULAR RATE: 79 BPM
EKG VENTRICULAR RATE: 82 BPM
EOSINOPHILS RELATIVE PERCENT: 0 % (ref 1–4)
EOSINOPHILS RELATIVE PERCENT: 3 % (ref 1–4)
EOSINOPHILS RELATIVE PERCENT: 4 % (ref 1–4)
EOSINOPHILS RELATIVE PERCENT: 4 % (ref 1–4)
EOSINOPHILS RELATIVE PERCENT: 5 % (ref 1–4)
EPITHELIAL CELLS UA: ABNORMAL /HPF (ref 0–5)
EPITHELIAL CELLS UA: ABNORMAL /HPF (ref 0–5)
ERYTHROPOIETIN: 20 MU/ML (ref 4–27)
ESTIMATED AVERAGE GLUCOSE: 163 MG/DL
EXPIRATION DATE: NORMAL
FERRITIN: 189 UG/L (ref 30–400)
FOLATE: 7.1 NG/ML
FREE KAPPA/LAMBDA RATIO: 1.32 (ref 0.26–1.65)
FREE KAPPA/LAMBDA RATIO: 1.4 (ref 0.26–1.65)
GAMMA GLOBULIN %: 12 % (ref 9–20)
GAMMA GLOBULIN %: 12 % (ref 9–20)
GAMMA GLOBULIN: 0.6 G/DL (ref 0.5–1.5)
GAMMA GLOBULIN: 0.6 G/DL (ref 0.5–1.5)
GFR AFRICAN AMERICAN: 24 ML/MIN
GFR AFRICAN AMERICAN: 24 ML/MIN
GFR AFRICAN AMERICAN: 31 ML/MIN
GFR AFRICAN AMERICAN: 32 ML/MIN
GFR AFRICAN AMERICAN: 33 ML/MIN
GFR AFRICAN AMERICAN: 33 ML/MIN
GFR AFRICAN AMERICAN: 47 ML/MIN
GFR AFRICAN AMERICAN: >60 ML/MIN
GFR NON-AFRICAN AMERICAN: 20 ML/MIN
GFR NON-AFRICAN AMERICAN: 20 ML/MIN
GFR NON-AFRICAN AMERICAN: 25 ML/MIN
GFR NON-AFRICAN AMERICAN: 26 ML/MIN
GFR NON-AFRICAN AMERICAN: 26 ML/MIN
GFR NON-AFRICAN AMERICAN: 27 ML/MIN
GFR NON-AFRICAN AMERICAN: 27 ML/MIN
GFR NON-AFRICAN AMERICAN: 28 ML/MIN
GFR NON-AFRICAN AMERICAN: 38 ML/MIN
GFR NON-AFRICAN AMERICAN: 51 ML/MIN
GFR SERPL CREATININE-BSD FRML MDRD: ABNORMAL ML/MIN/{1.73_M2}
GLUCOSE BLD-MCNC: 104 MG/DL (ref 74–100)
GLUCOSE BLD-MCNC: 115 MG/DL (ref 70–99)
GLUCOSE BLD-MCNC: 116 MG/DL (ref 70–99)
GLUCOSE BLD-MCNC: 118 MG/DL (ref 70–99)
GLUCOSE BLD-MCNC: 119 MG/DL (ref 70–99)
GLUCOSE BLD-MCNC: 121 MG/DL (ref 74–100)
GLUCOSE BLD-MCNC: 123 MG/DL (ref 70–99)
GLUCOSE BLD-MCNC: 133 MG/DL (ref 74–100)
GLUCOSE BLD-MCNC: 137 MG/DL (ref 70–99)
GLUCOSE BLD-MCNC: 138 MG/DL (ref 74–100)
GLUCOSE BLD-MCNC: 142 MG/DL (ref 74–100)
GLUCOSE BLD-MCNC: 145 MG/DL (ref 74–100)
GLUCOSE BLD-MCNC: 148 MG/DL (ref 70–99)
GLUCOSE BLD-MCNC: 150 MG/DL (ref 70–99)
GLUCOSE BLD-MCNC: 152 MG/DL (ref 74–100)
GLUCOSE BLD-MCNC: 152 MG/DL (ref 74–100)
GLUCOSE BLD-MCNC: 159 MG/DL (ref 74–100)
GLUCOSE BLD-MCNC: 163 MG/DL (ref 74–100)
GLUCOSE BLD-MCNC: 169 MG/DL (ref 70–99)
GLUCOSE BLD-MCNC: 173 MG/DL (ref 74–100)
GLUCOSE BLD-MCNC: 176 MG/DL (ref 70–99)
GLUCOSE BLD-MCNC: 191 MG/DL (ref 74–100)
GLUCOSE BLD-MCNC: 200 MG/DL (ref 74–100)
GLUCOSE BLD-MCNC: 207 MG/DL (ref 74–100)
GLUCOSE BLD-MCNC: 216 MG/DL (ref 74–100)
GLUCOSE BLD-MCNC: 219 MG/DL (ref 74–100)
GLUCOSE BLD-MCNC: 223 MG/DL (ref 74–100)
GLUCOSE BLD-MCNC: 224 MG/DL (ref 74–100)
GLUCOSE BLD-MCNC: 228 MG/DL (ref 74–100)
GLUCOSE BLD-MCNC: 230 MG/DL (ref 74–100)
GLUCOSE BLD-MCNC: 247 MG/DL (ref 74–100)
GLUCOSE BLD-MCNC: 98 MG/DL (ref 74–100)
GLUCOSE URINE: ABNORMAL
GLUCOSE URINE: ABNORMAL
HAPTOGLOBIN: 253 MG/DL (ref 30–200)
HBA1C MFR BLD: 7.3 % (ref 4–6)
HCT VFR BLD CALC: 21.4 % (ref 40.7–50.3)
HCT VFR BLD CALC: 23.3 % (ref 40.7–50.3)
HCT VFR BLD CALC: 23.5 % (ref 40.7–50.3)
HCT VFR BLD CALC: 23.5 % (ref 40.7–50.3)
HCT VFR BLD CALC: 24.6 % (ref 40.7–50.3)
HCT VFR BLD CALC: 24.7 % (ref 40.7–50.3)
HCT VFR BLD CALC: 25.1 % (ref 40.7–50.3)
HCT VFR BLD CALC: 25.7 % (ref 40.7–50.3)
HCT VFR BLD CALC: 26.6 % (ref 40.7–50.3)
HDLC SERPL-MCNC: 36 MG/DL
HEMOGLOBIN: 6.8 G/DL (ref 13–17)
HEMOGLOBIN: 7.3 G/DL (ref 13–17)
HEMOGLOBIN: 7.6 G/DL (ref 13–17)
HEMOGLOBIN: 7.9 G/DL (ref 13–17)
HEMOGLOBIN: 8 G/DL (ref 13–17)
HEMOGLOBIN: 8.1 G/DL (ref 13–17)
HEMOGLOBIN: 8.2 G/DL (ref 13–17)
IGA: 267 MG/DL (ref 70–400)
IGG: 722 MG/DL (ref 700–1600)
IGM: 59 MG/DL (ref 40–230)
IMMATURE GRANULOCYTES: 0 %
IMMATURE GRANULOCYTES: 1 %
IMMATURE GRANULOCYTES: 1 %
IMMATURE RETIC FRACT: 17.3 % (ref 2.7–18.3)
INR BLD: 1
IRON SATURATION: 15 % (ref 20–55)
IRON SATURATION: 16 % (ref 20–55)
IRON: 24 UG/DL (ref 59–158)
IRON: 28 UG/DL (ref 59–158)
KAPPA FREE LIGHT CHAINS QNT: 8.56 MG/DL (ref 0.37–1.94)
KAPPA FREE LIGHT CHAINS QNT: 8.58 MG/DL (ref 0.37–1.94)
KETONES, URINE: NEGATIVE
KETONES, URINE: NEGATIVE
LACTATE DEHYDROGENASE: 169 U/L (ref 135–225)
LACTIC ACID, SEPSIS WHOLE BLOOD: ABNORMAL MMOL/L (ref 0.5–1.9)
LACTIC ACID, SEPSIS WHOLE BLOOD: NORMAL MMOL/L (ref 0.5–1.9)
LACTIC ACID, SEPSIS WHOLE BLOOD: NORMAL MMOL/L (ref 0.5–1.9)
LACTIC ACID, SEPSIS: 0.4 MMOL/L (ref 0.5–1.9)
LACTIC ACID, SEPSIS: 0.5 MMOL/L (ref 0.5–1.9)
LACTIC ACID, SEPSIS: 0.8 MMOL/L (ref 0.5–1.9)
LAMBDA FREE LIGHT CHAINS QNT: 6.11 MG/DL (ref 0.57–2.63)
LAMBDA FREE LIGHT CHAINS QNT: 6.49 MG/DL (ref 0.57–2.63)
LDL CHOLESTEROL: 67 MG/DL (ref 0–130)
LEUKOCYTE ESTERASE, URINE: ABNORMAL
LEUKOCYTE ESTERASE, URINE: NEGATIVE
LV EF: 55 %
LVEF MODALITY: NORMAL
LYMPHOCYTES # BLD: 10 % (ref 24–43)
LYMPHOCYTES # BLD: 11 % (ref 24–43)
LYMPHOCYTES # BLD: 14 % (ref 24–43)
LYMPHOCYTES # BLD: 17 % (ref 24–43)
LYMPHOCYTES # BLD: 9 % (ref 24–43)
Lab: ABNORMAL
Lab: NORMAL
Lab: NORMAL
MAGNESIUM: 2 MG/DL (ref 1.6–2.6)
MCH RBC QN AUTO: 28.7 PG (ref 25.2–33.5)
MCH RBC QN AUTO: 29 PG (ref 25.2–33.5)
MCH RBC QN AUTO: 29.1 PG (ref 25.2–33.5)
MCH RBC QN AUTO: 29.2 PG (ref 25.2–33.5)
MCH RBC QN AUTO: 29.3 PG (ref 25.2–33.5)
MCH RBC QN AUTO: 29.4 PG (ref 25.2–33.5)
MCH RBC QN AUTO: 29.5 PG (ref 25.2–33.5)
MCHC RBC AUTO-ENTMCNC: 30.8 G/DL (ref 28.4–34.8)
MCHC RBC AUTO-ENTMCNC: 30.9 G/DL (ref 28.4–34.8)
MCHC RBC AUTO-ENTMCNC: 31.1 G/DL (ref 28.4–34.8)
MCHC RBC AUTO-ENTMCNC: 31.3 G/DL (ref 28.4–34.8)
MCHC RBC AUTO-ENTMCNC: 31.8 G/DL (ref 28.4–34.8)
MCHC RBC AUTO-ENTMCNC: 31.9 G/DL (ref 28.4–34.8)
MCHC RBC AUTO-ENTMCNC: 32 G/DL (ref 28.4–34.8)
MCV RBC AUTO: 91.3 FL (ref 82.6–102.9)
MCV RBC AUTO: 91.5 FL (ref 82.6–102.9)
MCV RBC AUTO: 92.6 FL (ref 82.6–102.9)
MCV RBC AUTO: 93 FL (ref 82.6–102.9)
MCV RBC AUTO: 93.2 FL (ref 82.6–102.9)
MCV RBC AUTO: 93.3 FL (ref 82.6–102.9)
MCV RBC AUTO: 95.3 FL (ref 82.6–102.9)
MONOCYTES # BLD: 13 % (ref 3–12)
MONOCYTES # BLD: 15 % (ref 3–12)
MONOCYTES # BLD: 15 % (ref 3–12)
MONOCYTES # BLD: 17 % (ref 3–12)
MONOCYTES # BLD: 3 % (ref 3–12)
MORPHOLOGY: NORMAL
MORPHOLOGY: NORMAL
MUCUS: ABNORMAL
MUCUS: ABNORMAL
NITRITE, URINE: NEGATIVE
NITRITE, URINE: NEGATIVE
NRBC AUTOMATED: 0 PER 100 WBC
OTHER OBSERVATIONS UA: ABNORMAL
OTHER OBSERVATIONS UA: ABNORMAL
PARTIAL THROMBOPLASTIN TIME: 37.9 SEC (ref 23.9–33.8)
PATHOLOGIST: ABNORMAL
PATHOLOGIST: ABNORMAL
PATHOLOGIST: NORMAL
PDW BLD-RTO: 14.2 % (ref 11.8–14.4)
PDW BLD-RTO: 14.2 % (ref 11.8–14.4)
PDW BLD-RTO: 14.3 % (ref 11.8–14.4)
PDW BLD-RTO: 14.4 % (ref 11.8–14.4)
PDW BLD-RTO: 14.5 % (ref 11.8–14.4)
PDW BLD-RTO: 14.6 % (ref 11.8–14.4)
PDW BLD-RTO: 15.4 % (ref 11.8–14.4)
PH UA: 5.5 (ref 5–9)
PH UA: 6 (ref 5–9)
PLATELET # BLD: 260 K/UL (ref 138–453)
PLATELET # BLD: 260 K/UL (ref 138–453)
PLATELET # BLD: 267 K/UL (ref 138–453)
PLATELET # BLD: 267 K/UL (ref 138–453)
PLATELET # BLD: 279 K/UL (ref 138–453)
PLATELET # BLD: 284 K/UL (ref 138–453)
PLATELET # BLD: 291 K/UL (ref 138–453)
PLATELET ESTIMATE: ABNORMAL
PMV BLD AUTO: 10 FL (ref 8.1–13.5)
PMV BLD AUTO: 10.2 FL (ref 8.1–13.5)
PMV BLD AUTO: 10.4 FL (ref 8.1–13.5)
PMV BLD AUTO: 10.4 FL (ref 8.1–13.5)
PMV BLD AUTO: 10.7 FL (ref 8.1–13.5)
PMV BLD AUTO: 9.7 FL (ref 8.1–13.5)
PMV BLD AUTO: 9.8 FL (ref 8.1–13.5)
POTASSIUM SERPL-SCNC: 3.6 MMOL/L (ref 3.7–5.3)
POTASSIUM SERPL-SCNC: 3.7 MMOL/L (ref 3.7–5.3)
POTASSIUM SERPL-SCNC: 3.9 MMOL/L (ref 3.7–5.3)
POTASSIUM SERPL-SCNC: 4 MMOL/L (ref 3.7–5.3)
POTASSIUM SERPL-SCNC: 4 MMOL/L (ref 3.7–5.3)
POTASSIUM SERPL-SCNC: 4.1 MMOL/L (ref 3.7–5.3)
POTASSIUM SERPL-SCNC: 4.5 MMOL/L (ref 3.7–5.3)
POTASSIUM SERPL-SCNC: 4.6 MMOL/L (ref 3.7–5.3)
PROSTATE SPECIFIC ANTIGEN: 4.21 UG/L
PROTEIN ELECTROPHORESIS, SERUM: ABNORMAL
PROTEIN ELECTROPHORESIS, SERUM: ABNORMAL
PROTEIN UA: ABNORMAL
PROTEIN UA: ABNORMAL
PROTHROMBIN TIME: 13.4 SEC (ref 11.5–14.2)
RBC # BLD: 2.31 M/UL (ref 4.21–5.77)
RBC # BLD: 2.5 M/UL (ref 4.21–5.77)
RBC # BLD: 2.52 M/UL (ref 4.21–5.77)
RBC # BLD: 2.58 M/UL (ref 4.21–5.77)
RBC # BLD: 2.7 M/UL (ref 4.21–5.77)
RBC # BLD: 2.75 M/UL (ref 4.21–5.77)
RBC # BLD: 2.86 M/UL (ref 4.21–5.77)
RBC # BLD: ABNORMAL 10*6/UL
RBC UA: ABNORMAL /HPF (ref 0–2)
RBC UA: ABNORMAL /HPF (ref 0–2)
RENAL EPITHELIAL, UA: ABNORMAL /HPF
RENAL EPITHELIAL, UA: ABNORMAL /HPF
RETIC %: 1.8 % (ref 0.5–1.9)
RETIC HEMOGLOBIN: 30.6 PG (ref 28.2–35.7)
SARS-COV-2, RAPID: NOT DETECTED
SARS-COV-2: NORMAL
SARS-COV-2: NORMAL
SEDIMENTATION RATE, ERYTHROCYTE: 97 MM (ref 0–20)
SEG NEUTROPHILS: 65 % (ref 36–65)
SEG NEUTROPHILS: 68 % (ref 36–65)
SEG NEUTROPHILS: 70 % (ref 36–65)
SEG NEUTROPHILS: 76 % (ref 36–65)
SEG NEUTROPHILS: 78 % (ref 36–65)
SEGMENTED NEUTROPHILS ABSOLUTE COUNT: 4.03 K/UL (ref 1.5–8.1)
SEGMENTED NEUTROPHILS ABSOLUTE COUNT: 4.39 K/UL (ref 1.5–8.1)
SEGMENTED NEUTROPHILS ABSOLUTE COUNT: 5.16 K/UL (ref 1.5–8.1)
SEGMENTED NEUTROPHILS ABSOLUTE COUNT: 5.62 K/UL (ref 1.5–8.1)
SEGMENTED NEUTROPHILS ABSOLUTE COUNT: 6.09 K/UL (ref 1.5–8.1)
SERUM IFX INTERP: NORMAL
SODIUM BLD-SCNC: 137 MMOL/L (ref 135–144)
SODIUM BLD-SCNC: 137 MMOL/L (ref 135–144)
SODIUM BLD-SCNC: 138 MMOL/L (ref 135–144)
SODIUM BLD-SCNC: 141 MMOL/L (ref 135–144)
SODIUM BLD-SCNC: 141 MMOL/L (ref 135–144)
SODIUM BLD-SCNC: 144 MMOL/L (ref 135–144)
SOURCE: NORMAL
SPECIFIC GRAVITY UA: 1.02 (ref 1.01–1.02)
SPECIFIC GRAVITY UA: 1.02 (ref 1.01–1.02)
SPECIMEN DESCRIPTION: ABNORMAL
SPECIMEN DESCRIPTION: NORMAL
SPECIMEN DESCRIPTION: NORMAL
TOTAL IRON BINDING CAPACITY: 158 UG/DL (ref 250–450)
TOTAL IRON BINDING CAPACITY: 178 UG/DL (ref 250–450)
TOTAL PROT. SUM,%: 100 % (ref 98–102)
TOTAL PROT. SUM,%: 100 % (ref 98–102)
TOTAL PROT. SUM: 4.9 G/DL (ref 6.3–8.2)
TOTAL PROT. SUM: 5.4 G/DL (ref 6.3–8.2)
TOTAL PROTEIN, URINE: 258 MG/DL
TOTAL PROTEIN: 4.8 G/DL (ref 6.4–8.3)
TOTAL PROTEIN: 5.2 G/DL (ref 6.4–8.3)
TOTAL PROTEIN: 5.3 G/DL (ref 6.4–8.3)
TRANSFUSION STATUS: NORMAL
TRANSFUSION STATUS: NORMAL
TRICHOMONAS: ABNORMAL
TRICHOMONAS: ABNORMAL
TRIGL SERPL-MCNC: 90 MG/DL
TROPONIN INTERP: ABNORMAL
TROPONIN T: ABNORMAL NG/ML
TROPONIN, HIGH SENSITIVITY: 115 NG/L (ref 0–22)
TROPONIN, HIGH SENSITIVITY: 124 NG/L (ref 0–22)
TROPONIN, HIGH SENSITIVITY: 136 NG/L (ref 0–22)
TURBIDITY: CLEAR
TURBIDITY: CLEAR
UNIT DIVISION: 0
UNIT DIVISION: 0
UNIT NUMBER: NORMAL
UNIT NUMBER: NORMAL
UNSATURATED IRON BINDING CAPACITY: 134 UG/DL (ref 112–347)
UNSATURATED IRON BINDING CAPACITY: 150 UG/DL (ref 112–347)
URIC ACID: 4.4 MG/DL (ref 3.4–7)
URINE HGB: ABNORMAL
URINE HGB: ABNORMAL
URINE TOTAL PROTEIN CREATININE RATIO: 4.26 (ref 0–0.2)
UROBILINOGEN, URINE: NORMAL
UROBILINOGEN, URINE: NORMAL
VITAMIN B-12: 361 PG/ML (ref 232–1245)
VLDLC SERPL CALC-MCNC: ABNORMAL MG/DL (ref 1–30)
WBC # BLD: 4.5 K/UL (ref 3.5–11.3)
WBC # BLD: 6 K/UL (ref 3.5–11.3)
WBC # BLD: 6.3 K/UL (ref 3.5–11.3)
WBC # BLD: 6.6 K/UL (ref 3.5–11.3)
WBC # BLD: 7.3 K/UL (ref 3.5–11.3)
WBC # BLD: 7.4 K/UL (ref 3.5–11.3)
WBC # BLD: 7.8 K/UL (ref 3.5–11.3)
WBC # BLD: ABNORMAL 10*3/UL
WBC UA: ABNORMAL /HPF (ref 0–5)
WBC UA: ABNORMAL /HPF (ref 0–5)
YEAST: ABNORMAL
YEAST: ABNORMAL

## 2020-01-01 PROCEDURE — 4040F PNEUMOC VAC/ADMIN/RCVD: CPT | Performed by: NEUROMUSCULOSKELETAL MEDICINE, SPORTS MEDICINE

## 2020-01-01 PROCEDURE — 36415 COLL VENOUS BLD VENIPUNCTURE: CPT

## 2020-01-01 PROCEDURE — 1200000000 HC SEMI PRIVATE

## 2020-01-01 PROCEDURE — 92526 ORAL FUNCTION THERAPY: CPT

## 2020-01-01 PROCEDURE — 85025 COMPLETE CBC W/AUTO DIFF WBC: CPT

## 2020-01-01 PROCEDURE — G8427 DOCREV CUR MEDS BY ELIG CLIN: HCPCS | Performed by: NEUROMUSCULOSKELETAL MEDICINE, SPORTS MEDICINE

## 2020-01-01 PROCEDURE — 6370000000 HC RX 637 (ALT 250 FOR IP): Performed by: INTERNAL MEDICINE

## 2020-01-01 PROCEDURE — 93010 ELECTROCARDIOGRAM REPORT: CPT | Performed by: INTERNAL MEDICINE

## 2020-01-01 PROCEDURE — 82668 ASSAY OF ERYTHROPOIETIN: CPT

## 2020-01-01 PROCEDURE — 85651 RBC SED RATE NONAUTOMATED: CPT

## 2020-01-01 PROCEDURE — 6360000002 HC RX W HCPCS: Performed by: INTERNAL MEDICINE

## 2020-01-01 PROCEDURE — 80048 BASIC METABOLIC PNL TOTAL CA: CPT

## 2020-01-01 PROCEDURE — 74176 CT ABD & PELVIS W/O CONTRAST: CPT

## 2020-01-01 PROCEDURE — 84484 ASSAY OF TROPONIN QUANT: CPT

## 2020-01-01 PROCEDURE — 70030 X-RAY EYE FOR FOREIGN BODY: CPT

## 2020-01-01 PROCEDURE — 97110 THERAPEUTIC EXERCISES: CPT

## 2020-01-01 PROCEDURE — 86403 PARTICLE AGGLUT ANTBDY SCRN: CPT

## 2020-01-01 PROCEDURE — 2580000003 HC RX 258: Performed by: INTERNAL MEDICINE

## 2020-01-01 PROCEDURE — 80061 LIPID PANEL: CPT

## 2020-01-01 PROCEDURE — P9016 RBC LEUKOCYTES REDUCED: HCPCS

## 2020-01-01 PROCEDURE — 2580000003 HC RX 258: Performed by: EMERGENCY MEDICINE

## 2020-01-01 PROCEDURE — U0002 COVID-19 LAB TEST NON-CDC: HCPCS

## 2020-01-01 PROCEDURE — 82947 ASSAY GLUCOSE BLOOD QUANT: CPT

## 2020-01-01 PROCEDURE — 70450 CT HEAD/BRAIN W/O DYE: CPT

## 2020-01-01 PROCEDURE — 85045 AUTOMATED RETICULOCYTE COUNT: CPT

## 2020-01-01 PROCEDURE — 85730 THROMBOPLASTIN TIME PARTIAL: CPT

## 2020-01-01 PROCEDURE — 83540 ASSAY OF IRON: CPT

## 2020-01-01 PROCEDURE — 51798 US URINE CAPACITY MEASURE: CPT

## 2020-01-01 PROCEDURE — 1123F ACP DISCUSS/DSCN MKR DOCD: CPT | Performed by: NEUROMUSCULOSKELETAL MEDICINE, SPORTS MEDICINE

## 2020-01-01 PROCEDURE — 92610 EVALUATE SWALLOWING FUNCTION: CPT

## 2020-01-01 PROCEDURE — 2580000003 HC RX 258: Performed by: NURSE PRACTITIONER

## 2020-01-01 PROCEDURE — 6370000000 HC RX 637 (ALT 250 FOR IP): Performed by: NURSE PRACTITIONER

## 2020-01-01 PROCEDURE — 84155 ASSAY OF PROTEIN SERUM: CPT

## 2020-01-01 PROCEDURE — 85018 HEMOGLOBIN: CPT

## 2020-01-01 PROCEDURE — 1036F TOBACCO NON-USER: CPT | Performed by: NEUROMUSCULOSKELETAL MEDICINE, SPORTS MEDICINE

## 2020-01-01 PROCEDURE — 99232 SBSQ HOSP IP/OBS MODERATE 35: CPT | Performed by: INTERNAL MEDICINE

## 2020-01-01 PROCEDURE — 99285 EMERGENCY DEPT VISIT HI MDM: CPT

## 2020-01-01 PROCEDURE — 99214 OFFICE O/P EST MOD 30 MIN: CPT

## 2020-01-01 PROCEDURE — 84165 PROTEIN E-PHORESIS SERUM: CPT

## 2020-01-01 PROCEDURE — 99222 1ST HOSP IP/OBS MODERATE 55: CPT | Performed by: INTERNAL MEDICINE

## 2020-01-01 PROCEDURE — 87186 SC STD MICRODIL/AGAR DIL: CPT

## 2020-01-01 PROCEDURE — 3017F COLORECTAL CA SCREEN DOC REV: CPT | Performed by: NEUROMUSCULOSKELETAL MEDICINE, SPORTS MEDICINE

## 2020-01-01 PROCEDURE — 83010 ASSAY OF HAPTOGLOBIN QUANT: CPT

## 2020-01-01 PROCEDURE — 82728 ASSAY OF FERRITIN: CPT

## 2020-01-01 PROCEDURE — 3017F COLORECTAL CA SCREEN DOC REV: CPT | Performed by: NURSE PRACTITIONER

## 2020-01-01 PROCEDURE — 82746 ASSAY OF FOLIC ACID SERUM: CPT

## 2020-01-01 PROCEDURE — 4040F PNEUMOC VAC/ADMIN/RCVD: CPT | Performed by: NURSE PRACTITIONER

## 2020-01-01 PROCEDURE — 4040F PNEUMOC VAC/ADMIN/RCVD: CPT | Performed by: UROLOGY

## 2020-01-01 PROCEDURE — 99214 OFFICE O/P EST MOD 30 MIN: CPT | Performed by: NEUROMUSCULOSKELETAL MEDICINE, SPORTS MEDICINE

## 2020-01-01 PROCEDURE — 86334 IMMUNOFIX E-PHORESIS SERUM: CPT

## 2020-01-01 PROCEDURE — 36430 TRANSFUSION BLD/BLD COMPNT: CPT

## 2020-01-01 PROCEDURE — 83036 HEMOGLOBIN GLYCOSYLATED A1C: CPT

## 2020-01-01 PROCEDURE — G8417 CALC BMI ABV UP PARAM F/U: HCPCS | Performed by: NEUROMUSCULOSKELETAL MEDICINE, SPORTS MEDICINE

## 2020-01-01 PROCEDURE — 1123F ACP DISCUSS/DSCN MKR DOCD: CPT | Performed by: NURSE PRACTITIONER

## 2020-01-01 PROCEDURE — 81001 URINALYSIS AUTO W/SCOPE: CPT

## 2020-01-01 PROCEDURE — 51798 US URINE CAPACITY MEASURE: CPT | Performed by: UROLOGY

## 2020-01-01 PROCEDURE — 85014 HEMATOCRIT: CPT

## 2020-01-01 PROCEDURE — 86850 RBC ANTIBODY SCREEN: CPT

## 2020-01-01 PROCEDURE — 86901 BLOOD TYPING SEROLOGIC RH(D): CPT

## 2020-01-01 PROCEDURE — 80053 COMPREHEN METABOLIC PANEL: CPT

## 2020-01-01 PROCEDURE — 87040 BLOOD CULTURE FOR BACTERIA: CPT

## 2020-01-01 PROCEDURE — 82140 ASSAY OF AMMONIA: CPT

## 2020-01-01 PROCEDURE — G8428 CUR MEDS NOT DOCUMENT: HCPCS | Performed by: NURSE PRACTITIONER

## 2020-01-01 PROCEDURE — 99213 OFFICE O/P EST LOW 20 MIN: CPT

## 2020-01-01 PROCEDURE — 71045 X-RAY EXAM CHEST 1 VIEW: CPT

## 2020-01-01 PROCEDURE — G8417 CALC BMI ABV UP PARAM F/U: HCPCS | Performed by: UROLOGY

## 2020-01-01 PROCEDURE — G0103 PSA SCREENING: HCPCS

## 2020-01-01 PROCEDURE — 99223 1ST HOSP IP/OBS HIGH 75: CPT | Performed by: INTERNAL MEDICINE

## 2020-01-01 PROCEDURE — 99214 OFFICE O/P EST MOD 30 MIN: CPT | Performed by: NURSE PRACTITIONER

## 2020-01-01 PROCEDURE — 83883 ASSAY NEPHELOMETRY NOT SPEC: CPT

## 2020-01-01 PROCEDURE — 99233 SBSQ HOSP IP/OBS HIGH 50: CPT | Performed by: INTERNAL MEDICINE

## 2020-01-01 PROCEDURE — 3017F COLORECTAL CA SCREEN DOC REV: CPT | Performed by: UROLOGY

## 2020-01-01 PROCEDURE — 82784 ASSAY IGA/IGD/IGG/IGM EACH: CPT

## 2020-01-01 PROCEDURE — G8427 DOCREV CUR MEDS BY ELIG CLIN: HCPCS | Performed by: UROLOGY

## 2020-01-01 PROCEDURE — P9603 ONE-WAY ALLOW PRORATED MILES: HCPCS

## 2020-01-01 PROCEDURE — 1123F ACP DISCUSS/DSCN MKR DOCD: CPT | Performed by: UROLOGY

## 2020-01-01 PROCEDURE — 96374 THER/PROPH/DIAG INJ IV PUSH: CPT

## 2020-01-01 PROCEDURE — 84156 ASSAY OF PROTEIN URINE: CPT

## 2020-01-01 PROCEDURE — 93306 TTE W/DOPPLER COMPLETE: CPT

## 2020-01-01 PROCEDURE — 97162 PT EVAL MOD COMPLEX 30 MIN: CPT

## 2020-01-01 PROCEDURE — 97167 OT EVAL HIGH COMPLEX 60 MIN: CPT

## 2020-01-01 PROCEDURE — 82570 ASSAY OF URINE CREATININE: CPT

## 2020-01-01 PROCEDURE — 86920 COMPATIBILITY TEST SPIN: CPT

## 2020-01-01 PROCEDURE — 1036F TOBACCO NON-USER: CPT | Performed by: UROLOGY

## 2020-01-01 PROCEDURE — 93005 ELECTROCARDIOGRAM TRACING: CPT | Performed by: EMERGENCY MEDICINE

## 2020-01-01 PROCEDURE — 86900 BLOOD TYPING SEROLOGIC ABO: CPT

## 2020-01-01 PROCEDURE — 83550 IRON BINDING TEST: CPT

## 2020-01-01 PROCEDURE — 93005 ELECTROCARDIOGRAM TRACING: CPT | Performed by: INTERNAL MEDICINE

## 2020-01-01 PROCEDURE — 83605 ASSAY OF LACTIC ACID: CPT

## 2020-01-01 PROCEDURE — 85027 COMPLETE CBC AUTOMATED: CPT

## 2020-01-01 PROCEDURE — 84550 ASSAY OF BLOOD/URIC ACID: CPT

## 2020-01-01 PROCEDURE — 82607 VITAMIN B-12: CPT

## 2020-01-01 PROCEDURE — 76775 US EXAM ABDO BACK WALL LIM: CPT

## 2020-01-01 PROCEDURE — 83735 ASSAY OF MAGNESIUM: CPT

## 2020-01-01 PROCEDURE — 99211 OFF/OP EST MAY X REQ PHY/QHP: CPT | Performed by: UROLOGY

## 2020-01-01 PROCEDURE — 85610 PROTHROMBIN TIME: CPT

## 2020-01-01 PROCEDURE — 87086 URINE CULTURE/COLONY COUNT: CPT

## 2020-01-01 PROCEDURE — 99213 OFFICE O/P EST LOW 20 MIN: CPT | Performed by: UROLOGY

## 2020-01-01 PROCEDURE — 6360000002 HC RX W HCPCS: Performed by: EMERGENCY MEDICINE

## 2020-01-01 PROCEDURE — 83615 LACTATE (LD) (LDH) ENZYME: CPT

## 2020-01-01 RX ORDER — AMLODIPINE BESYLATE 10 MG/1
10 TABLET ORAL DAILY
Status: DISCONTINUED | OUTPATIENT
Start: 2020-01-01 | End: 2020-01-01 | Stop reason: HOSPADM

## 2020-01-01 RX ORDER — HALOPERIDOL 0.5 MG/1
0.5 TABLET ORAL EVERY 6 HOURS PRN
Status: DISCONTINUED | OUTPATIENT
Start: 2020-01-01 | End: 2020-01-01 | Stop reason: HOSPADM

## 2020-01-01 RX ORDER — CARVEDILOL 12.5 MG/1
12.5 TABLET ORAL 2 TIMES DAILY WITH MEALS
Qty: 60 TABLET | Refills: 3 | DISCHARGE
Start: 2020-01-01

## 2020-01-01 RX ORDER — GLIMEPIRIDE 2 MG/1
2 TABLET ORAL
Status: ON HOLD | COMMUNITY
Start: 2020-01-01 | End: 2020-01-01 | Stop reason: HOSPADM

## 2020-01-01 RX ORDER — SODIUM CHLORIDE 9 MG/ML
INJECTION, SOLUTION INTRAVENOUS CONTINUOUS
Status: CANCELLED | OUTPATIENT
Start: 2020-01-01

## 2020-01-01 RX ORDER — SODIUM CHLORIDE 0.9 % (FLUSH) 0.9 %
10 SYRINGE (ML) INJECTION PRN
Status: DISCONTINUED | OUTPATIENT
Start: 2020-01-01 | End: 2020-01-01 | Stop reason: HOSPADM

## 2020-01-01 RX ORDER — GABAPENTIN 300 MG/1
300 CAPSULE ORAL 3 TIMES DAILY
Status: DISCONTINUED | OUTPATIENT
Start: 2020-01-01 | End: 2020-01-01 | Stop reason: HOSPADM

## 2020-01-01 RX ORDER — LOSARTAN POTASSIUM AND HYDROCHLOROTHIAZIDE 25; 100 MG/1; MG/1
1 TABLET ORAL DAILY
Status: DISCONTINUED | OUTPATIENT
Start: 2020-01-01 | End: 2020-01-01

## 2020-01-01 RX ORDER — LOSARTAN POTASSIUM 50 MG/1
100 TABLET ORAL DAILY
Status: DISCONTINUED | OUTPATIENT
Start: 2020-01-01 | End: 2020-01-01

## 2020-01-01 RX ORDER — SODIUM CHLORIDE 9 MG/ML
INJECTION, SOLUTION INTRAVENOUS CONTINUOUS
Status: DISCONTINUED | OUTPATIENT
Start: 2020-01-01 | End: 2020-01-01

## 2020-01-01 RX ORDER — SODIUM CHLORIDE 0.9 % (FLUSH) 0.9 %
5 SYRINGE (ML) INJECTION PRN
Status: CANCELLED | OUTPATIENT
Start: 2020-01-01

## 2020-01-01 RX ORDER — CARVEDILOL 12.5 MG/1
12.5 TABLET ORAL 2 TIMES DAILY WITH MEALS
Status: DISCONTINUED | OUTPATIENT
Start: 2020-01-01 | End: 2020-01-01 | Stop reason: HOSPADM

## 2020-01-01 RX ORDER — CARVEDILOL 6.25 MG/1
6.25 TABLET ORAL 2 TIMES DAILY WITH MEALS
Status: DISCONTINUED | OUTPATIENT
Start: 2020-01-01 | End: 2020-01-01

## 2020-01-01 RX ORDER — CLOPIDOGREL BISULFATE 75 MG/1
75 TABLET ORAL DAILY
Status: DISCONTINUED | OUTPATIENT
Start: 2020-01-01 | End: 2020-01-01 | Stop reason: HOSPADM

## 2020-01-01 RX ORDER — LOSARTAN POTASSIUM 100 MG/1
100 TABLET ORAL DAILY
Status: ON HOLD | COMMUNITY
End: 2020-01-01 | Stop reason: HOSPADM

## 2020-01-01 RX ORDER — PROMETHAZINE HYDROCHLORIDE 25 MG/1
12.5 TABLET ORAL EVERY 6 HOURS PRN
Status: DISCONTINUED | OUTPATIENT
Start: 2020-01-01 | End: 2020-01-01 | Stop reason: HOSPADM

## 2020-01-01 RX ORDER — GLIMEPIRIDE 2 MG/1
2 TABLET ORAL 2 TIMES DAILY WITH MEALS
Qty: 30 TABLET | Refills: 3 | DISCHARGE
Start: 2020-01-01

## 2020-01-01 RX ORDER — AMLODIPINE BESYLATE 10 MG/1
10 TABLET ORAL DAILY
COMMUNITY

## 2020-01-01 RX ORDER — CARVEDILOL 3.12 MG/1
3.12 TABLET ORAL 2 TIMES DAILY WITH MEALS
Status: DISCONTINUED | OUTPATIENT
Start: 2020-01-01 | End: 2020-01-01

## 2020-01-01 RX ORDER — DEXTROSE MONOHYDRATE 50 MG/ML
100 INJECTION, SOLUTION INTRAVENOUS PRN
Status: DISCONTINUED | OUTPATIENT
Start: 2020-01-01 | End: 2020-01-01 | Stop reason: HOSPADM

## 2020-01-01 RX ORDER — HYDRALAZINE HYDROCHLORIDE 50 MG/1
100 TABLET, FILM COATED ORAL EVERY 8 HOURS SCHEDULED
Status: DISCONTINUED | OUTPATIENT
Start: 2020-01-01 | End: 2020-01-01 | Stop reason: HOSPADM

## 2020-01-01 RX ORDER — SERTRALINE HYDROCHLORIDE 100 MG/1
200 TABLET, FILM COATED ORAL DAILY
Status: DISCONTINUED | OUTPATIENT
Start: 2020-01-01 | End: 2020-01-01 | Stop reason: HOSPADM

## 2020-01-01 RX ORDER — SODIUM BICARBONATE 650 MG/1
650 TABLET ORAL 2 TIMES DAILY
DISCHARGE
Start: 2020-01-01

## 2020-01-01 RX ORDER — INSULIN ASPART 100 [IU]/ML
INJECTION, SOLUTION INTRAVENOUS; SUBCUTANEOUS 4 TIMES DAILY
COMMUNITY

## 2020-01-01 RX ORDER — POLYETHYLENE GLYCOL 3350 17 G/17G
17 POWDER, FOR SOLUTION ORAL DAILY PRN
Status: DISCONTINUED | OUTPATIENT
Start: 2020-01-01 | End: 2020-01-01 | Stop reason: HOSPADM

## 2020-01-01 RX ORDER — GLIMEPIRIDE 1 MG/1
2 TABLET ORAL 2 TIMES DAILY WITH MEALS
Status: DISCONTINUED | OUTPATIENT
Start: 2020-01-01 | End: 2020-01-01 | Stop reason: HOSPADM

## 2020-01-01 RX ORDER — HYDRALAZINE HYDROCHLORIDE 100 MG/1
100 TABLET, FILM COATED ORAL EVERY 8 HOURS SCHEDULED
Qty: 90 TABLET | Refills: 3 | DISCHARGE
Start: 2020-01-01

## 2020-01-01 RX ORDER — HYDRALAZINE HYDROCHLORIDE 50 MG/1
50 TABLET, FILM COATED ORAL EVERY 8 HOURS SCHEDULED
Status: DISCONTINUED | OUTPATIENT
Start: 2020-01-01 | End: 2020-01-01

## 2020-01-01 RX ORDER — ACETAMINOPHEN 325 MG/1
650 TABLET ORAL EVERY 6 HOURS PRN
Status: DISCONTINUED | OUTPATIENT
Start: 2020-01-01 | End: 2020-01-01 | Stop reason: HOSPADM

## 2020-01-01 RX ORDER — ONDANSETRON 2 MG/ML
4 INJECTION INTRAMUSCULAR; INTRAVENOUS EVERY 6 HOURS PRN
Status: DISCONTINUED | OUTPATIENT
Start: 2020-01-01 | End: 2020-01-01 | Stop reason: HOSPADM

## 2020-01-01 RX ORDER — TAMSULOSIN HYDROCHLORIDE 0.4 MG/1
0.4 CAPSULE ORAL DAILY
Status: DISCONTINUED | OUTPATIENT
Start: 2020-01-01 | End: 2020-01-01 | Stop reason: HOSPADM

## 2020-01-01 RX ORDER — NICOTINE POLACRILEX 4 MG
15 LOZENGE BUCCAL PRN
Status: DISCONTINUED | OUTPATIENT
Start: 2020-01-01 | End: 2020-01-01 | Stop reason: HOSPADM

## 2020-01-01 RX ORDER — ACETAMINOPHEN 650 MG/1
650 SUPPOSITORY RECTAL EVERY 6 HOURS PRN
Status: DISCONTINUED | OUTPATIENT
Start: 2020-01-01 | End: 2020-01-01 | Stop reason: HOSPADM

## 2020-01-01 RX ORDER — SODIUM CHLORIDE 0.9 % (FLUSH) 0.9 %
10 SYRINGE (ML) INJECTION EVERY 12 HOURS SCHEDULED
Status: DISCONTINUED | OUTPATIENT
Start: 2020-01-01 | End: 2020-01-01 | Stop reason: HOSPADM

## 2020-01-01 RX ORDER — SODIUM BICARBONATE 650 MG/1
650 TABLET ORAL 2 TIMES DAILY
Status: DISCONTINUED | OUTPATIENT
Start: 2020-01-01 | End: 2020-01-01 | Stop reason: HOSPADM

## 2020-01-01 RX ORDER — INSULIN GLARGINE 100 [IU]/ML
15 INJECTION, SOLUTION SUBCUTANEOUS NIGHTLY
Status: DISCONTINUED | OUTPATIENT
Start: 2020-01-01 | End: 2020-01-01 | Stop reason: HOSPADM

## 2020-01-01 RX ORDER — DEXTROSE MONOHYDRATE 25 G/50ML
12.5 INJECTION, SOLUTION INTRAVENOUS PRN
Status: DISCONTINUED | OUTPATIENT
Start: 2020-01-01 | End: 2020-01-01 | Stop reason: HOSPADM

## 2020-01-01 RX ORDER — LOPERAMIDE HYDROCHLORIDE 2 MG/1
2 CAPSULE ORAL 4 TIMES DAILY PRN
COMMUNITY

## 2020-01-01 RX ORDER — SODIUM CHLORIDE 0.9 % (FLUSH) 0.9 %
10 SYRINGE (ML) INJECTION PRN
Status: CANCELLED | OUTPATIENT
Start: 2020-01-01

## 2020-01-01 RX ORDER — CLOPIDOGREL BISULFATE 75 MG/1
75 TABLET ORAL DAILY
Qty: 30 TABLET | Refills: 3 | DISCHARGE
Start: 2020-01-01

## 2020-01-01 RX ORDER — LOSARTAN POTASSIUM AND HYDROCHLOROTHIAZIDE 25; 100 MG/1; MG/1
1 TABLET ORAL DAILY
Status: ON HOLD | COMMUNITY
End: 2020-01-01 | Stop reason: ALTCHOICE

## 2020-01-01 RX ORDER — HYDRALAZINE HYDROCHLORIDE 50 MG/1
TABLET, FILM COATED ORAL
Status: DISPENSED
Start: 2020-01-01 | End: 2020-01-01

## 2020-01-01 RX ORDER — AMMONIUM LACTATE 12 G/100G
CREAM TOPICAL NIGHTLY
COMMUNITY

## 2020-01-01 RX ORDER — HYDRALAZINE HYDROCHLORIDE 25 MG/1
50 TABLET, FILM COATED ORAL 3 TIMES DAILY
Status: ON HOLD | COMMUNITY
End: 2020-01-01 | Stop reason: HOSPADM

## 2020-01-01 RX ORDER — HYDROCHLOROTHIAZIDE 25 MG/1
25 TABLET ORAL DAILY
Status: DISCONTINUED | OUTPATIENT
Start: 2020-01-01 | End: 2020-01-01

## 2020-01-01 RX ORDER — 0.9 % SODIUM CHLORIDE 0.9 %
250 INTRAVENOUS SOLUTION INTRAVENOUS ONCE
Status: COMPLETED | OUTPATIENT
Start: 2020-01-01 | End: 2020-01-01

## 2020-01-01 RX ADMIN — ENOXAPARIN SODIUM 40 MG: 40 INJECTION, SOLUTION INTRAVENOUS; SUBCUTANEOUS at 08:52

## 2020-01-01 RX ADMIN — CARVEDILOL 3.12 MG: 3.12 TABLET, FILM COATED ORAL at 17:02

## 2020-01-01 RX ADMIN — GABAPENTIN 300 MG: 300 CAPSULE ORAL at 08:09

## 2020-01-01 RX ADMIN — ENOXAPARIN SODIUM 40 MG: 40 INJECTION, SOLUTION INTRAVENOUS; SUBCUTANEOUS at 09:03

## 2020-01-01 RX ADMIN — SODIUM CHLORIDE, PRESERVATIVE FREE 10 ML: 5 INJECTION INTRAVENOUS at 20:56

## 2020-01-01 RX ADMIN — GABAPENTIN 300 MG: 300 CAPSULE ORAL at 08:29

## 2020-01-01 RX ADMIN — HYDRALAZINE HYDROCHLORIDE 100 MG: 50 TABLET, FILM COATED ORAL at 05:55

## 2020-01-01 RX ADMIN — SERTRALINE HYDROCHLORIDE 200 MG: 100 TABLET ORAL at 08:09

## 2020-01-01 RX ADMIN — SODIUM BICARBONATE 650 MG: 650 TABLET ORAL at 21:52

## 2020-01-01 RX ADMIN — TAMSULOSIN HYDROCHLORIDE 0.4 MG: 0.4 CAPSULE ORAL at 08:09

## 2020-01-01 RX ADMIN — GLIMEPIRIDE 2 MG: 1 TABLET ORAL at 16:38

## 2020-01-01 RX ADMIN — SERTRALINE HYDROCHLORIDE 200 MG: 100 TABLET ORAL at 08:29

## 2020-01-01 RX ADMIN — GABAPENTIN 300 MG: 300 CAPSULE ORAL at 20:25

## 2020-01-01 RX ADMIN — SODIUM CHLORIDE: 9 INJECTION, SOLUTION INTRAVENOUS at 09:16

## 2020-01-01 RX ADMIN — HYDRALAZINE HYDROCHLORIDE 100 MG: 50 TABLET, FILM COATED ORAL at 13:55

## 2020-01-01 RX ADMIN — SODIUM CHLORIDE, PRESERVATIVE FREE 10 ML: 5 INJECTION INTRAVENOUS at 22:04

## 2020-01-01 RX ADMIN — INSULIN GLARGINE 15 UNITS: 100 INJECTION, SOLUTION SUBCUTANEOUS at 21:59

## 2020-01-01 RX ADMIN — HYDRALAZINE HYDROCHLORIDE 50 MG: 50 TABLET, FILM COATED ORAL at 21:35

## 2020-01-01 RX ADMIN — IRON SUCROSE 200 MG: 20 INJECTION, SOLUTION INTRAVENOUS at 21:51

## 2020-01-01 RX ADMIN — SODIUM CHLORIDE, PRESERVATIVE FREE 10 ML: 5 INJECTION INTRAVENOUS at 09:01

## 2020-01-01 RX ADMIN — SODIUM CHLORIDE: 9 INJECTION, SOLUTION INTRAVENOUS at 20:00

## 2020-01-01 RX ADMIN — ENOXAPARIN SODIUM 40 MG: 40 INJECTION, SOLUTION INTRAVENOUS; SUBCUTANEOUS at 08:30

## 2020-01-01 RX ADMIN — CARVEDILOL 6.25 MG: 6.25 TABLET, FILM COATED ORAL at 07:37

## 2020-01-01 RX ADMIN — LOSARTAN POTASSIUM 100 MG: 50 TABLET, FILM COATED ORAL at 08:30

## 2020-01-01 RX ADMIN — GABAPENTIN 300 MG: 300 CAPSULE ORAL at 09:19

## 2020-01-01 RX ADMIN — ACETAMINOPHEN 650 MG: 325 TABLET, FILM COATED ORAL at 17:37

## 2020-01-01 RX ADMIN — CLOPIDOGREL BISULFATE 75 MG: 75 TABLET ORAL at 08:29

## 2020-01-01 RX ADMIN — HALOPERIDOL 0.5 MG: 0.5 TABLET ORAL at 21:52

## 2020-01-01 RX ADMIN — INSULIN LISPRO 4 UNITS: 100 INJECTION, SOLUTION INTRAVENOUS; SUBCUTANEOUS at 12:53

## 2020-01-01 RX ADMIN — SERTRALINE HYDROCHLORIDE 200 MG: 100 TABLET ORAL at 09:01

## 2020-01-01 RX ADMIN — SODIUM CHLORIDE, PRESERVATIVE FREE 10 ML: 5 INJECTION INTRAVENOUS at 20:04

## 2020-01-01 RX ADMIN — SODIUM CHLORIDE, PRESERVATIVE FREE 10 ML: 5 INJECTION INTRAVENOUS at 09:03

## 2020-01-01 RX ADMIN — SODIUM CHLORIDE: 9 INJECTION, SOLUTION INTRAVENOUS at 02:31

## 2020-01-01 RX ADMIN — TAMSULOSIN HYDROCHLORIDE 0.4 MG: 0.4 CAPSULE ORAL at 09:03

## 2020-01-01 RX ADMIN — INSULIN LISPRO 4 UNITS: 100 INJECTION, SOLUTION INTRAVENOUS; SUBCUTANEOUS at 16:26

## 2020-01-01 RX ADMIN — GABAPENTIN 300 MG: 300 CAPSULE ORAL at 08:51

## 2020-01-01 RX ADMIN — CARVEDILOL 12.5 MG: 12.5 TABLET, FILM COATED ORAL at 17:14

## 2020-01-01 RX ADMIN — GABAPENTIN 300 MG: 300 CAPSULE ORAL at 09:01

## 2020-01-01 RX ADMIN — SODIUM CHLORIDE, PRESERVATIVE FREE 10 ML: 5 INJECTION INTRAVENOUS at 08:33

## 2020-01-01 RX ADMIN — SODIUM CHLORIDE, PRESERVATIVE FREE 10 ML: 5 INJECTION INTRAVENOUS at 22:08

## 2020-01-01 RX ADMIN — GABAPENTIN 300 MG: 300 CAPSULE ORAL at 21:35

## 2020-01-01 RX ADMIN — TAMSULOSIN HYDROCHLORIDE 0.4 MG: 0.4 CAPSULE ORAL at 08:52

## 2020-01-01 RX ADMIN — HYDRALAZINE HYDROCHLORIDE 50 MG: 50 TABLET, FILM COATED ORAL at 22:34

## 2020-01-01 RX ADMIN — SODIUM CHLORIDE: 9 INJECTION, SOLUTION INTRAVENOUS at 20:04

## 2020-01-01 RX ADMIN — GLIMEPIRIDE 2 MG: 1 TABLET ORAL at 17:28

## 2020-01-01 RX ADMIN — GABAPENTIN 300 MG: 300 CAPSULE ORAL at 22:04

## 2020-01-01 RX ADMIN — CLOPIDOGREL BISULFATE 75 MG: 75 TABLET ORAL at 08:52

## 2020-01-01 RX ADMIN — CLOPIDOGREL BISULFATE 75 MG: 75 TABLET ORAL at 07:34

## 2020-01-01 RX ADMIN — SERTRALINE HYDROCHLORIDE 200 MG: 100 TABLET ORAL at 08:33

## 2020-01-01 RX ADMIN — GABAPENTIN 300 MG: 300 CAPSULE ORAL at 09:03

## 2020-01-01 RX ADMIN — CARVEDILOL 3.12 MG: 3.12 TABLET, FILM COATED ORAL at 17:37

## 2020-01-01 RX ADMIN — HALOPERIDOL 0.5 MG: 0.5 TABLET ORAL at 13:08

## 2020-01-01 RX ADMIN — GABAPENTIN 300 MG: 300 CAPSULE ORAL at 21:52

## 2020-01-01 RX ADMIN — GLIMEPIRIDE 2 MG: 1 TABLET ORAL at 09:03

## 2020-01-01 RX ADMIN — SODIUM CHLORIDE: 9 INJECTION, SOLUTION INTRAVENOUS at 16:47

## 2020-01-01 RX ADMIN — HYDRALAZINE HYDROCHLORIDE 100 MG: 50 TABLET, FILM COATED ORAL at 14:55

## 2020-01-01 RX ADMIN — INSULIN GLARGINE 15 UNITS: 100 INJECTION, SOLUTION SUBCUTANEOUS at 21:18

## 2020-01-01 RX ADMIN — GLIMEPIRIDE 2 MG: 1 TABLET ORAL at 17:37

## 2020-01-01 RX ADMIN — CARVEDILOL 3.12 MG: 3.12 TABLET, FILM COATED ORAL at 09:03

## 2020-01-01 RX ADMIN — INSULIN LISPRO 6 UNITS: 100 INJECTION, SOLUTION INTRAVENOUS; SUBCUTANEOUS at 16:38

## 2020-01-01 RX ADMIN — TAMSULOSIN HYDROCHLORIDE 0.4 MG: 0.4 CAPSULE ORAL at 08:33

## 2020-01-01 RX ADMIN — ACETAMINOPHEN 650 MG: 325 TABLET, FILM COATED ORAL at 00:44

## 2020-01-01 RX ADMIN — HYDRALAZINE HYDROCHLORIDE 50 MG: 50 TABLET, FILM COATED ORAL at 05:16

## 2020-01-01 RX ADMIN — GLIMEPIRIDE 2 MG: 1 TABLET ORAL at 07:37

## 2020-01-01 RX ADMIN — GABAPENTIN 300 MG: 300 CAPSULE ORAL at 15:46

## 2020-01-01 RX ADMIN — GABAPENTIN 300 MG: 300 CAPSULE ORAL at 15:18

## 2020-01-01 RX ADMIN — AMLODIPINE BESYLATE 10 MG: 10 TABLET ORAL at 08:33

## 2020-01-01 RX ADMIN — AMLODIPINE BESYLATE 10 MG: 10 TABLET ORAL at 08:09

## 2020-01-01 RX ADMIN — SODIUM CHLORIDE, PRESERVATIVE FREE 10 ML: 5 INJECTION INTRAVENOUS at 21:41

## 2020-01-01 RX ADMIN — HYDRALAZINE HYDROCHLORIDE 100 MG: 50 TABLET, FILM COATED ORAL at 21:52

## 2020-01-01 RX ADMIN — INSULIN GLARGINE 15 UNITS: 100 INJECTION, SOLUTION SUBCUTANEOUS at 21:37

## 2020-01-01 RX ADMIN — TAMSULOSIN HYDROCHLORIDE 0.4 MG: 0.4 CAPSULE ORAL at 08:29

## 2020-01-01 RX ADMIN — GABAPENTIN 300 MG: 300 CAPSULE ORAL at 20:02

## 2020-01-01 RX ADMIN — HYDRALAZINE HYDROCHLORIDE 75 MG: 50 TABLET, FILM COATED ORAL at 22:04

## 2020-01-01 RX ADMIN — HYDRALAZINE HYDROCHLORIDE 100 MG: 50 TABLET, FILM COATED ORAL at 17:28

## 2020-01-01 RX ADMIN — SODIUM BICARBONATE 650 MG: 650 TABLET ORAL at 09:19

## 2020-01-01 RX ADMIN — HYDRALAZINE HYDROCHLORIDE 100 MG: 50 TABLET, FILM COATED ORAL at 14:32

## 2020-01-01 RX ADMIN — GLIMEPIRIDE 2 MG: 1 TABLET ORAL at 08:33

## 2020-01-01 RX ADMIN — CARVEDILOL 12.5 MG: 12.5 TABLET, FILM COATED ORAL at 07:33

## 2020-01-01 RX ADMIN — SODIUM CHLORIDE: 9 INJECTION, SOLUTION INTRAVENOUS at 05:17

## 2020-01-01 RX ADMIN — INSULIN GLARGINE 15 UNITS: 100 INJECTION, SOLUTION SUBCUTANEOUS at 20:25

## 2020-01-01 RX ADMIN — SODIUM CHLORIDE 250 ML: 9 INJECTION, SOLUTION INTRAVENOUS at 10:05

## 2020-01-01 RX ADMIN — TAMSULOSIN HYDROCHLORIDE 0.4 MG: 0.4 CAPSULE ORAL at 09:01

## 2020-01-01 RX ADMIN — SODIUM CHLORIDE: 9 INJECTION, SOLUTION INTRAVENOUS at 06:40

## 2020-01-01 RX ADMIN — HYDRALAZINE HYDROCHLORIDE 50 MG: 50 TABLET, FILM COATED ORAL at 13:12

## 2020-01-01 RX ADMIN — SERTRALINE HYDROCHLORIDE 200 MG: 100 TABLET ORAL at 08:51

## 2020-01-01 RX ADMIN — CARVEDILOL 12.5 MG: 12.5 TABLET, FILM COATED ORAL at 17:28

## 2020-01-01 RX ADMIN — GLIMEPIRIDE 2 MG: 1 TABLET ORAL at 17:14

## 2020-01-01 RX ADMIN — CARVEDILOL 12.5 MG: 12.5 TABLET, FILM COATED ORAL at 08:33

## 2020-01-01 RX ADMIN — AMLODIPINE BESYLATE 10 MG: 10 TABLET ORAL at 07:33

## 2020-01-01 RX ADMIN — INSULIN LISPRO 4 UNITS: 100 INJECTION, SOLUTION INTRAVENOUS; SUBCUTANEOUS at 11:27

## 2020-01-01 RX ADMIN — GLIMEPIRIDE 2 MG: 1 TABLET ORAL at 08:09

## 2020-01-01 RX ADMIN — CLOPIDOGREL BISULFATE 75 MG: 75 TABLET ORAL at 08:33

## 2020-01-01 RX ADMIN — IRON SUCROSE 200 MG: 20 INJECTION, SOLUTION INTRAVENOUS at 13:54

## 2020-01-01 RX ADMIN — PROMETHAZINE HYDROCHLORIDE 12.5 MG: 25 TABLET ORAL at 00:44

## 2020-01-01 RX ADMIN — AMLODIPINE BESYLATE 10 MG: 10 TABLET ORAL at 08:30

## 2020-01-01 RX ADMIN — HYDRALAZINE HYDROCHLORIDE 75 MG: 50 TABLET, FILM COATED ORAL at 15:46

## 2020-01-01 RX ADMIN — GLIMEPIRIDE 2 MG: 1 TABLET ORAL at 17:53

## 2020-01-01 RX ADMIN — CLOPIDOGREL BISULFATE 75 MG: 75 TABLET ORAL at 09:04

## 2020-01-01 RX ADMIN — INSULIN LISPRO 4 UNITS: 100 INJECTION, SOLUTION INTRAVENOUS; SUBCUTANEOUS at 11:47

## 2020-01-01 RX ADMIN — SERTRALINE HYDROCHLORIDE 200 MG: 100 TABLET ORAL at 09:03

## 2020-01-01 RX ADMIN — HYDRALAZINE HYDROCHLORIDE 75 MG: 50 TABLET, FILM COATED ORAL at 05:30

## 2020-01-01 RX ADMIN — INSULIN LISPRO 6 UNITS: 100 INJECTION, SOLUTION INTRAVENOUS; SUBCUTANEOUS at 12:14

## 2020-01-01 RX ADMIN — GABAPENTIN 300 MG: 300 CAPSULE ORAL at 14:33

## 2020-01-01 RX ADMIN — GABAPENTIN 300 MG: 300 CAPSULE ORAL at 17:31

## 2020-01-01 RX ADMIN — CLOPIDOGREL BISULFATE 75 MG: 75 TABLET ORAL at 08:09

## 2020-01-01 RX ADMIN — SODIUM BICARBONATE 650 MG: 650 TABLET ORAL at 08:33

## 2020-01-01 RX ADMIN — GABAPENTIN 300 MG: 300 CAPSULE ORAL at 14:54

## 2020-01-01 RX ADMIN — GLIMEPIRIDE 2 MG: 1 TABLET ORAL at 08:52

## 2020-01-01 RX ADMIN — Medication 10 ML: at 10:05

## 2020-01-01 RX ADMIN — SODIUM CHLORIDE, PRESERVATIVE FREE 10 ML: 5 INJECTION INTRAVENOUS at 08:09

## 2020-01-01 RX ADMIN — GLIMEPIRIDE 2 MG: 1 TABLET ORAL at 07:34

## 2020-01-01 RX ADMIN — ACETAMINOPHEN 650 MG: 325 TABLET, FILM COATED ORAL at 20:24

## 2020-01-01 RX ADMIN — SODIUM CHLORIDE, PRESERVATIVE FREE 10 ML: 5 INJECTION INTRAVENOUS at 07:34

## 2020-01-01 RX ADMIN — AMLODIPINE BESYLATE 10 MG: 10 TABLET ORAL at 09:03

## 2020-01-01 RX ADMIN — AMLODIPINE BESYLATE 10 MG: 10 TABLET ORAL at 08:52

## 2020-01-01 RX ADMIN — SERTRALINE HYDROCHLORIDE 200 MG: 100 TABLET ORAL at 07:34

## 2020-01-01 RX ADMIN — CARVEDILOL 3.12 MG: 3.12 TABLET, FILM COATED ORAL at 08:52

## 2020-01-01 RX ADMIN — HYDRALAZINE HYDROCHLORIDE 100 MG: 50 TABLET, FILM COATED ORAL at 07:33

## 2020-01-01 RX ADMIN — SODIUM CHLORIDE, PRESERVATIVE FREE 10 ML: 5 INJECTION INTRAVENOUS at 22:02

## 2020-01-01 RX ADMIN — ENOXAPARIN SODIUM 40 MG: 40 INJECTION, SOLUTION INTRAVENOUS; SUBCUTANEOUS at 08:34

## 2020-01-01 RX ADMIN — INSULIN GLARGINE 15 UNITS: 100 INJECTION, SOLUTION SUBCUTANEOUS at 20:02

## 2020-01-01 RX ADMIN — CARVEDILOL 6.25 MG: 6.25 TABLET, FILM COATED ORAL at 17:53

## 2020-01-01 RX ADMIN — INSULIN LISPRO 6 UNITS: 100 INJECTION, SOLUTION INTRAVENOUS; SUBCUTANEOUS at 11:35

## 2020-01-01 RX ADMIN — GABAPENTIN 300 MG: 300 CAPSULE ORAL at 08:33

## 2020-01-01 RX ADMIN — TAMSULOSIN HYDROCHLORIDE 0.4 MG: 0.4 CAPSULE ORAL at 07:34

## 2020-01-01 RX ADMIN — WATER 1 G: 1 INJECTION INTRAMUSCULAR; INTRAVENOUS; SUBCUTANEOUS at 20:11

## 2020-01-01 RX ADMIN — SODIUM BICARBONATE 650 MG: 650 TABLET ORAL at 20:25

## 2020-01-01 RX ADMIN — AMLODIPINE BESYLATE 10 MG: 10 TABLET ORAL at 09:01

## 2020-01-01 RX ADMIN — GLIMEPIRIDE 2 MG: 1 TABLET ORAL at 17:02

## 2020-01-01 RX ADMIN — HYDRALAZINE HYDROCHLORIDE 50 MG: 50 TABLET, FILM COATED ORAL at 16:44

## 2020-01-01 RX ADMIN — CLOPIDOGREL BISULFATE 75 MG: 75 TABLET ORAL at 09:01

## 2020-01-01 RX ADMIN — INSULIN LISPRO 4 UNITS: 100 INJECTION, SOLUTION INTRAVENOUS; SUBCUTANEOUS at 16:20

## 2020-01-01 RX ADMIN — GABAPENTIN 300 MG: 300 CAPSULE ORAL at 13:55

## 2020-01-01 RX ADMIN — ENOXAPARIN SODIUM 40 MG: 40 INJECTION, SOLUTION INTRAVENOUS; SUBCUTANEOUS at 08:09

## 2020-01-01 RX ADMIN — GLIMEPIRIDE 2 MG: 1 TABLET ORAL at 08:29

## 2020-01-01 RX ADMIN — HYDRALAZINE HYDROCHLORIDE 50 MG: 50 TABLET, FILM COATED ORAL at 05:24

## 2020-01-01 RX ADMIN — HYDROCHLOROTHIAZIDE 25 MG: 25 TABLET ORAL at 08:30

## 2020-01-01 RX ADMIN — GABAPENTIN 300 MG: 300 CAPSULE ORAL at 13:12

## 2020-01-01 ASSESSMENT — PAIN SCALES - GENERAL
PAINLEVEL_OUTOF10: 0
PAINLEVEL_OUTOF10: 10
PAINLEVEL_OUTOF10: 0
PAINLEVEL_OUTOF10: 2
PAINLEVEL_OUTOF10: 0
PAINLEVEL_OUTOF10: 8
PAINLEVEL_OUTOF10: 0
PAINLEVEL_OUTOF10: 0

## 2020-01-01 ASSESSMENT — ENCOUNTER SYMPTOMS
EYE REDNESS: 0
BACK PAIN: 0
EYE DISCHARGE: 0
ABDOMINAL PAIN: 0
WHEEZING: 0
CHOKING: 0
COLOR CHANGE: 0
NAUSEA: 0
ABDOMINAL PAIN: 0
VOMITING: 0
EYE REDNESS: 0
WHEEZING: 0
EYE REDNESS: 0
EYE PAIN: 0
VOMITING: 0
CONSTIPATION: 1
COUGH: 0
ABDOMINAL PAIN: 0
WHEEZING: 0
TROUBLE SWALLOWING: 0
SHORTNESS OF BREATH: 0
VOMITING: 0
COLOR CHANGE: 0
CONSTIPATION: 0
ABDOMINAL DISTENTION: 0
BACK PAIN: 0
EYE PAIN: 0
SHORTNESS OF BREATH: 0
COLOR CHANGE: 0
COUGH: 0
SHORTNESS OF BREATH: 0
COUGH: 0
EYE PAIN: 0
BACK PAIN: 0

## 2020-01-01 ASSESSMENT — PAIN DESCRIPTION - ONSET: ONSET: GRADUAL

## 2020-01-01 ASSESSMENT — PAIN DESCRIPTION - PAIN TYPE: TYPE: CHRONIC PAIN

## 2020-01-01 ASSESSMENT — PAIN DESCRIPTION - ORIENTATION: ORIENTATION: LEFT;LOWER

## 2020-01-01 ASSESSMENT — PAIN DESCRIPTION - FREQUENCY: FREQUENCY: CONTINUOUS

## 2020-01-01 ASSESSMENT — PAIN - FUNCTIONAL ASSESSMENT
PAIN_FUNCTIONAL_ASSESSMENT: 0-10
PAIN_FUNCTIONAL_ASSESSMENT: 0-10

## 2020-01-01 ASSESSMENT — PAIN DESCRIPTION - LOCATION: LOCATION: BACK;HIP

## 2020-01-01 ASSESSMENT — PAIN DESCRIPTION - DESCRIPTORS: DESCRIPTORS: SORE;THROBBING;ACHING

## 2020-05-05 NOTE — PROGRESS NOTES
2020    TELEHEALTH EVALUATION -- Audio/Visual (During MAXAY-49 public health emergency)    HPI:    Saul Lopez (:  1945) has requested an audio/video evaluation for the following concern(s):    History  10/2019 Referral for urinary retention. He was admitted to [facility] in 2019 due to new onset left lower extremity weakness with new urinary and fecal incontinence. Prior to this he did have a history of chronic low back pain. While admitted he did have a Manriquez catheter placed for unknown amount. UA C&S is negative for infection. He was started on Flomax and evaluated by urology. Urology did feel that his urinary issues were secondary to retention. He did have a CTA in 2019 showed no evidence of renal stones, hydronephrosis or suspicious mass. There was bladder distention noted, but no obvious filling defect. While in the hospital prior to discharge the Manriquez catheter was removed. Renal labs have remained stable: BUN 22, creatinine 1.1, GFR >60. Random bladder scan is 141 mL. He urinated over 1 hour ago. Staff at the facility reports he is incontinence twice during the day and does have worsening incontinence at night. They do feel that the incontinence correlates with his back pain. However, patient denies incontinence today in the office. He reports he urinates very seldom throughout the day, approximately every 8 hours. He does have diabetes and neuropathy. Most recent A1c was 7.0. He denies incontinence. He denies any gross hematuria or dysuria. He does continue to struggle with intermittent constipation. He was recently treated for C. Difficile. I do feel that he is a component of neurogenic bladder secondary to diabetes. We will have staff implament time voids every 3 hours while awake. I did explain the importance of this to both patient and his wife. We will continue Flomax daily. He will take MiraLAX daily for his bowels.      Today  Here today to follow-up for urinary retention, BPH, incontinence, and constipation. I wanted to see patient due to worsening renal function and due to his history of urinary retention. Renal function reviewed: BUN 27, creatinine 1.37, GFR 51. Renal function from 12/2019: BUN 13, creatinine 1.01, GFR >60. Visit conducted with the help of nursing staff. Staff is practicing timed voids. They do report daytime incontinence incontinence at night often due to patient not wanting to use the urinal. BM every other day. Patient is not communicative today during his exam. Most of the history was obtained from staff. Staff denies any changes in behavior or appetite. He has not started any new medications. PSA from 3/2020 was elevated at 4.21. Review of Systems   Constitutional: Negative for appetite change, chills and fever. Eyes: Negative for pain, redness and visual disturbance. Respiratory: Negative for cough, shortness of breath and wheezing. Cardiovascular: Negative for chest pain and leg swelling. Gastrointestinal: Positive for constipation. Negative for abdominal pain, nausea and vomiting. Genitourinary: Positive for enuresis. Negative for decreased urine volume, difficulty urinating, discharge, dysuria, flank pain, frequency, hematuria, penile pain, scrotal swelling, testicular pain and urgency. Musculoskeletal: Negative for back pain, joint swelling and myalgias. Skin: Negative for color change, rash and wound. Neurological: Negative for dizziness, tremors and numbness. Hematological: Negative for adenopathy. Does not bruise/bleed easily. Psychiatric/Behavioral: Negative for behavioral problems. No Known Allergies    Prior to Visit Medications    Medication Sig Taking?  Authorizing Provider   insulin glargine (LANTUS) 100 UNIT/ML injection vial Inject 10 Units into the skin nightly  Historical Provider, MD   Sodium Phosphates (FLEET) 7-19 GM/118ML Place 1 enema rectally once as needed  Historical Provider,

## 2020-05-28 PROBLEM — K59.09 OTHER CONSTIPATION: Status: ACTIVE | Noted: 2019-01-01

## 2020-05-28 NOTE — PROGRESS NOTES
GM/118ML Place 1 enema rectally once as needed  Historical Provider, MD   glucose (GLUTOSE) 40 % GEL Take by mouth  Historical Provider, MD   glucagon, rDNA, 1 MG injection Inject 1 mg into the muscle  Historical Provider, MD   insulin lispro (HUMALOG) 100 UNIT/ML injection vial Inject into the skin USE PER SLIDING SCALE QID  Historical Provider, MD   magnesium hydroxide (MILK OF MAGNESIA) 400 MG/5ML suspension Take 30 mLs by mouth  Historical Provider, MD   polyethylene glycol (GLYCOLAX) packet Take 17 g by mouth  Historical Provider, MD   sertraline (ZOLOFT) 100 MG tablet Take 150 mg by mouth daily   Historical Provider, MD   gabapentin (NEURONTIN) 300 MG capsule Take 300 mg by mouth 3 times daily.   Historical Provider, MD   bisacodyl (DULCOLAX) 10 MG suppository Place 10 mg rectally daily as needed for Constipation  Historical Provider, MD   tamsulosin (FLOMAX) 0.4 MG capsule Take 1 capsule by mouth daily  Lenin Vásquez MD   verapamil (CALAN SR) 240 MG extended release tablet Take 240 mg by mouth nightly  Historical Provider, MD   lisinopril-hydrochlorothiazide (PRINZIDE;ZESTORETIC) 10-12.5 MG per tablet Take 1 tablet by mouth daily  Historical Provider, MD   acetaminophen (TYLENOL) 500 MG tablet Take 1,000 mg by mouth 3 times daily as needed for Pain  Historical Provider, MD   docusate sodium (COLACE) 100 MG capsule Take 100 mg by mouth daily   Historical Provider, MD   metFORMIN (GLUCOPHAGE) 500 MG tablet Take 500 mg by mouth 2 times daily (with meals)  Historical Provider, MD       Social History     Tobacco Use    Smoking status: Never Smoker    Smokeless tobacco: Never Used   Substance Use Topics    Alcohol use: No    Drug use: No        Past Medical History:   Diagnosis Date    BPH (benign prostatic hyperplasia)     [facility]     Depression     Diabetes mellitus ([facility]     Dorsalgia     Hypertension     Incontinence of feces     Lumbar radiculopathy  TIA (transient ischemic attack)     Urinary retention        Past Surgical History:   Procedure Laterality Date    NERVE SURGERY Left 10/15/2019    LUMBAR FACET-L4-L5, L5-SI performed by Crystal Palacio MD at [address]       No family history on file. PHYSICAL EXAMINATION:  Constitutional: [x] Appears well-developed and well-nourished [x] No apparent distress      [] Abnormal-   Mental status  [x] Alert and awake  [x] Oriented to person/place [x]Able to follow commands      Eyes:  EOM    [x]  Normal  [] Abnormal-  Sclera  [x]  Normal  [] Abnormal -         Discharge []  None visible  [] Abnormal -    HENT:   [x] Normocephalic, atraumatic. [] Abnormal   [x] Mouth/Throat: Mucous membranes are moist.     External Ears [x] Normal  [] Abnormal-     Neck: [x] No visualized mass     Pulmonary/Chest: [x] Respiratory effort normal.  [x] No visualized signs of difficulty breathing or respiratory distress        [] Abnormal-      Musculoskeletal:   [x] Normal gait with no signs of ataxia         [x] Normal range of motion of neck        [] Abnormal-       Neurological:        [x] No Facial Asymmetry (Cranial nerve 7 motor function) (limited exam to video visit)          [x] No gaze palsy        [] Abnormal-         Skin:        [x] No significant exanthematous lesions or discoloration noted on facial skin         [] Abnormal-            Psychiatric:       [x] Normal Affect [x] No Hallucinations        [] Abnormal-       ASSESSMENT:   Diagnosis Orders   1. BPH with obstruction/lower urinary tract symptoms  Basic Metabolic Panel   2. Constipation, unspecified constipation type     3. Neurogenic bladder     4. Urinary retention  Basic Metabolic Panel   5. LISANDRA (acute kidney injury) [facility])  Basic Metabolic Panel     PLAN:  We will check a BMP now. We will continue Flomax daily. We will plan to see him back in 3 to 4 months with a BMP prior.       Mnauela Robertson is a 70 y.o. male being evaluated by a Virtual Visit (video visit) encounter to address concerns as mentioned above. A caregiver was present when appropriate. Due to this being a TeleHealth encounter (During MMUEJ-60 public health emergency), evaluation of the following organ systems was limited: Vitals/Constitutional/EENT/Resp/CV/GI//MS/Neuro/Skin/Heme-Lymph-Imm. Pursuant to the emergency declaration under the [address] [place] [address] and the [employer] and [employer] Act, this Virtual Visit was conducted with patient's (and/or legal guardian's) consent, to reduce the patient's risk of exposure to COVID-19 and provide necessary medical care. The patient (and/or legal guardian) has also been advised to contact this office for worsening conditions or problems, and seek emergency medical treatment and/or call 911 if deemed necessary. Services were provided through a video synchronous discussion virtually to substitute for in-person clinic visit. The patient was located in their home. The provider was located in the office. --SEBLE Matson CNP on 5/28/2020 at 5:10 PM    An electronic signature was used to authenticate this note.

## 2020-06-03 NOTE — TELEPHONE ENCOUNTER
Renal function is worse than prior lab 1 month ago. PVR are low. Plan for a CT and have patient follow-up in the office to discuss results.

## 2020-06-04 NOTE — PROGRESS NOTES
Headache: present, Light sensitivity: absent   Psychiatric Anxiety: absent, Depression  absent, drug abuse: absent, Hallucination: absent, mood disorder: absent, Suicidal ideations absent   Hematologic Abnormal bleeding: absent, Anemia: absent, Lymph gland changes: absent Clotting disorder: absent     Past Medical History:   Diagnosis Date    BPH (benign prostatic hyperplasia)     [facility]     Depression     [facility]     Dorsalgia     Hypertension     Incontinence of feces     Lumbar radiculopathy     TIA (transient ischemic attack)     Urinary retention        Past Surgical History:   Procedure Laterality Date    NERVE SURGERY Left 10/15/2019    LUMBAR FACET-L4-L5, L5-SI performed by Astrid Avila MD at [address] [place] [address]     Socioeconomic History    Marital status:      Spouse name: Not on file    Number of children: Not on file    Years of education: Not on file    Highest education level: Not on file   Occupational History    Not on file   Social Needs    Financial resource strain: Not on file    Food insecurity     Worry: Not on file     Inability: Not on file    Transportation needs     Medical: Not on file     Non-medical: Not on file   Tobacco Use    Smoking status: Never Smoker    Smokeless tobacco: Never Used   Substance and Sexual Activity    Alcohol use: No    Drug use: No    Sexual activity: Not on file   Lifestyle    Physical activity     Days per week: Not on file     Minutes per session: Not on file    Stress: Not on file   Relationships    Social connections     Talks on phone: Not on file     Gets together: Not on file     Attends Confucianist service: Not on file     Active member of club or organization: Not on file     Attends meetings of clubs or organizations: Not on file     Relationship status: Not on file    Intimate partner violence     Fear of current or ex partner: Not on file

## 2020-06-29 NOTE — PROGRESS NOTES
did have a recent CT scan. This film was independently reviewed today. Patient has no renal calculus seen. He has no hydronephrosis. There is a cyst on the right kidney. This is approximately 6 cm and unchanged from previous imaging. Patient has no other significant  abnormalities. Patient has no recent gross hematuria dysuria. He reports no flank pain nausea vomiting. Patient was unable to void today. He did have a random bladder scan for 365 mL. Past Medical History:   Diagnosis Date    BPH (benign prostatic hyperplasia)     Cerebral artery occlusion with cerebral infarction ([address]     Depression     Diabetes mellitus ([address]     Dorsalgia     Hypertension     Incontinence of feces     Lumbar radiculopathy     TIA (transient ischemic attack)     Urinary retention      Past Surgical History:   Procedure Laterality Date    NERVE SURGERY Left 10/15/2019    LUMBAR FACET-L4-L5, L5-SI performed by Lilian Flower MD at [address] Encounter Medications as of 6/29/2020   Medication Sig Dispense Refill    glimepiride (AMARYL) 2 MG tablet Take 2 mg by mouth 2 times daily       insulin glargine (LANTUS) 100 UNIT/ML injection vial Inject 10 Units into the skin nightly      Sodium Phosphates (FLEET) 7-19 GM/118ML Place 1 enema rectally once as needed      glucose (GLUTOSE) 40 % GEL Take by mouth      glucagon, rDNA, 1 MG injection Inject 1 mg into the muscle      insulin lispro (HUMALOG) 100 UNIT/ML injection vial Inject into the skin USE PER SLIDING SCALE QID      magnesium hydroxide (MILK OF MAGNESIA) 400 MG/5ML suspension Take 30 mLs by mouth      polyethylene glycol (GLYCOLAX) packet Take 17 g by mouth      sertraline (ZOLOFT) 100 MG tablet Take 150 mg by mouth daily       gabapentin (NEURONTIN) 300 MG capsule Take 300 mg by mouth 3 times daily.       bisacodyl (DULCOLAX) 10 MG suppository Place 10 mg rectally daily as needed for Constipation      tamsulosin (FLOMAX) 0.4 MG capsule Take 1 capsule by mouth daily 30 capsule 3    verapamil (CALAN SR) 240 MG extended release tablet Take 240 mg by mouth nightly      lisinopril-hydrochlorothiazide (PRINZIDE;ZESTORETIC) 10-12.5 MG per tablet Take 1 tablet by mouth daily      acetaminophen (TYLENOL) 500 MG tablet Take 1,000 mg by mouth 3 times daily as needed for Pain      docusate sodium (COLACE) 100 MG capsule Take 100 mg by mouth daily       metFORMIN (GLUCOPHAGE) 500 MG tablet Take 500 mg by mouth 2 times daily (with meals)       No facility-administered encounter medications on file as of 6/29/2020. Current Outpatient Medications on File Prior to Visit   Medication Sig Dispense Refill    glimepiride (AMARYL) 2 MG tablet Take 2 mg by mouth 2 times daily       insulin glargine (LANTUS) 100 UNIT/ML injection vial Inject 10 Units into the skin nightly      Sodium Phosphates (FLEET) 7-19 GM/118ML Place 1 enema rectally once as needed      glucose (GLUTOSE) 40 % GEL Take by mouth      glucagon, rDNA, 1 MG injection Inject 1 mg into the muscle      insulin lispro (HUMALOG) 100 UNIT/ML injection vial Inject into the skin USE PER SLIDING SCALE QID      magnesium hydroxide (MILK OF MAGNESIA) 400 MG/5ML suspension Take 30 mLs by mouth      polyethylene glycol (GLYCOLAX) packet Take 17 g by mouth      sertraline (ZOLOFT) 100 MG tablet Take 150 mg by mouth daily       gabapentin (NEURONTIN) 300 MG capsule Take 300 mg by mouth 3 times daily.       bisacodyl (DULCOLAX) 10 MG suppository Place 10 mg rectally daily as needed for Constipation      tamsulosin (FLOMAX) 0.4 MG capsule Take 1 capsule by mouth daily 30 capsule 3    verapamil (CALAN SR) 240 MG extended release tablet Take 240 mg by mouth nightly      lisinopril-hydrochlorothiazide (PRINZIDE;ZESTORETIC) 10-12.5 MG per tablet Take 1 tablet by mouth daily      acetaminophen (TYLENOL) 500 MG tablet Take 1,000 mg by mouth 3 times daily as needed for Pain      docusate sodium

## 2020-07-23 NOTE — PROGRESS NOTES
NEUROLOGY Follow-up. Patient Name:  Clyde Ervin  :   1945  Clinic Visit Date: 2020    I saw Mr. Clyde Ervin  in the neurology clinic today for follow-up of weakness and numbness and tingling in the lower extremities. The patient is a 82-year-old gentleman with a history of diabetes and diabetic peripheral neuropathy. He was accompanied by his wife for this office visit. .  According to her he has been  weak in both the lower limbs and  has been nonambulatory at the nursing home. He is usually wheelchair bound and has been getting minimal physical therapy at the nursing home. Patient is a poor historian. However, he denies symptoms of pain or numbness in the lower extremities. He is currently on gabapentin for symptoms of neuropathy. Blood work recently included a normal serum protein electrophoresis and also an  elevated sed rate. REVIEW OF SYSTEMS    Constitutional Weight changes: absent, change in appetite: absent Fatigue: absent; Fevers : absent, Any recent hospitalizations:  absent   HEENT Ears: normal,  Visual disturbance: absent   Respiratory Shortness of breath: absent, choking:  absent, Cough: absent, Snoring : absent   Cardiovascular Chest pain: absent, Leg swelling :absent, palpitations : absent, fainting : absent   GI Constipation: absent, Diarrhea: absent, Swallowing change: absent    Urinary frequency: absent, Urinary urgency: absent, Urinary incontinence: absent   Musculoskeletal Neck pain: absent, Back pain: absent, Stiffness: absent, Muscle pain: absent, Joint pain: present, restless leg : absent   Dermatological Hair loss: absent, Skin changes: absent   Neurological Confusion: absent, Trouble concentrating: absent, Seizures: absent;  Memory loss: absent, balance problem: absent, Dizziness: absent, vertigo: absent, Weakness: absent, Numbness present, Tremor: absent, Spasm: absent, involuntary movement: absent, Speech difficulty: absent, Headache: absent, Light sensitivity: absent   Psychiatric Anxiety: absent, Depression  absent, drug abuse: absent, Hallucination: absent, mood disorder: absent, Suicidal ideations absent   Hematologic Abnormal bleeding: absent, Anemia: absent, Lymph gland changes: absent Clotting disorder: absent     Past Medical History:   Diagnosis Date    BPH (benign prostatic hyperplasia)     [facility]     Depression     [facility]     Dorsalgia     Hypertension     Incontinence of feces     Lumbar radiculopathy     TIA (transient ischemic attack)     Urinary retention        Past Surgical History:   Procedure Laterality Date    NERVE SURGERY Left 10/15/2019    LUMBAR FACET-L4-L5, L5-SI performed by Eleazar Mullen MD at [address] [place] [address]     Socioeconomic History    Marital status:      Spouse name: Not on file    Number of children: Not on file    Years of education: Not on file    Highest education level: Not on file   Occupational History    Not on file   Social Needs    Financial resource strain: Not on file    Food insecurity     Worry: Not on file     Inability: Not on file    Transportation needs     Medical: Not on file     Non-medical: Not on file   Tobacco Use    Smoking status: Never Smoker    Smokeless tobacco: Never Used   Substance and Sexual Activity    Alcohol use: No    Drug use: No    Sexual activity: Not on file   Lifestyle    Physical activity     Days per week: Not on file     Minutes per session: Not on file    Stress: Not on file   Relationships    Social connections     Talks on phone: Not on file     Gets together: Not on file     Attends Islam service: Not on file     Active member of club or organization: Not on file     Attends meetings of clubs or organizations: Not on file     Relationship status: Not on file    Intimate partner violence     Fear of current or ex partner: Not on file     Emotionally abused: Not on file     Physically abused: Not on file     Forced sexual activity: Not on file   Other Topics Concern    Not on file   Social History Narrative    Not on file       History reviewed. No pertinent family history. Current Outpatient Medications   Medication Sig Dispense Refill    amLODIPine (NORVASC) 10 MG tablet Take 10 mg by mouth daily      ammonium lactate (AMLACTIN) 12 % cream Apply topically nightly Apply topically as needed.  hydrALAZINE (APRESOLINE) 25 MG tablet Take 25 mg by mouth 3 times daily      losartan-hydrochlorothiazide (HYZAAR) 100-25 MG per tablet Take 1 tablet by mouth daily      glimepiride (AMARYL) 2 MG tablet Take 2 mg by mouth 2 times daily       insulin glargine (LANTUS) 100 UNIT/ML injection vial Inject 10 Units into the skin nightly      Sodium Phosphates (FLEET) 7-19 GM/118ML Place 1 enema rectally once as needed      glucose (GLUTOSE) 40 % GEL Take by mouth      glucagon, rDNA, 1 MG injection Inject 1 mg into the muscle      insulin lispro (HUMALOG) 100 UNIT/ML injection vial Inject into the skin USE PER SLIDING SCALE QID      magnesium hydroxide (MILK OF MAGNESIA) 400 MG/5ML suspension Take 30 mLs by mouth      polyethylene glycol (GLYCOLAX) packet Take 17 g by mouth      sertraline (ZOLOFT) 100 MG tablet Take 200 mg by mouth daily       gabapentin (NEURONTIN) 300 MG capsule Take 300 mg by mouth 3 times daily.       bisacodyl (DULCOLAX) 10 MG suppository Place 10 mg rectally daily as needed for Constipation      tamsulosin (FLOMAX) 0.4 MG capsule Take 1 capsule by mouth daily 30 capsule 3    verapamil (CALAN SR) 240 MG extended release tablet Take 240 mg by mouth nightly      acetaminophen (TYLENOL) 500 MG tablet Take 1,000 mg by mouth 3 times daily as needed for Pain      docusate sodium (COLACE) 100 MG capsule Take 100 mg by mouth daily       metFORMIN (GLUCOPHAGE) 500 MG tablet Take 500 mg by mouth 2 times daily (with meals)       No current facility-administered medications for this visit. DATA:  Lab Results   Component Value Date    WBC 6.2 12/23/2019    HGB 9.1 (L) 12/23/2019     12/23/2019    CHOL 127 12/23/2019    TRIG 124 12/23/2019    HDL 32 (L) 12/23/2019    ALT 5 12/23/2019    AST 11 12/23/2019     06/03/2020    K 4.1 06/03/2020     06/03/2020    CREATININE 1.75 (H) 06/03/2020    BUN 32 (H) 06/03/2020    CO2 23 06/03/2020    TSH 1.45 10/25/2019    INR 1.0 09/07/2019    LABA1C 7.4 (H) 12/23/2019       BP (!) 106/57 (Site: Left Upper Arm, Position: Sitting, Cuff Size: Medium Adult)   Pulse 95   Temp 97.3 °F (36.3 °C) (Temporal)   Ht 6' 1\" (1.854 m)   Wt 217 lb (98.4 kg) Comment: as of 7/1/2020  BMI 28.63 kg/m²     NEUROLOGICAL EXAMINATION:     MENTAL STATUS: Patient is alert and awake. Poor attention span. He did not know his correct age. He knew his date of birth. He knew the year but not the month. .  Memory is poor for remote and recent events. Not very cooperative with questions. CRANIAL NERVES: III-XII are normal.    MOTOR EXAMINATION: Muscle tone is normal in all the limbs. There is moderate wasting of the small muscles of the hand including interossei with associated weakness. There is wasting of the quadriceps muscles in both the legs. Proximal lower limb weakness+ L>R. .  Right foot dorsiflexion and eversion weakness+. No tremor or any abnormal extremity movements    SENSORY EXAMINATION: Glove and stocking decrease in sensation in both lower extremities and upper extremities. Vibration position sensation impaired in both the lower extremities. STRETCH REFLEXES:.  Symmetrical and diminished in both the upper and lower limbs. GAIT: Wheelchair-bound. IMPRESSION:    1. Diabetic peripheral neuropathy   2. Cognitive dysfunction. Probable underlying vascular dementia. PLAN:    1. Continue gabapentin. 2.  Continue physical therapy at a nursing home  3.   Follow-up in 6 months    NOTE: This neurology evaluation is part of outpatient coverage at [facility]  1-2 days per week. Patients requiring frequent evaluations or uncomfortable with potential 3-4 day turnaround on questions or calls  may be better served by a neurologist in the area full time. [facility]. [facility] is available for outpatient visits and procedures including EMG/NCS. Non-[facility] neurologists also practice in [facility] (Dr. Zbigniew Castillo) and [place] ([place] Uma[place]).        Kapil Horn MD   7/23/2020  5:14 PM

## 2020-07-23 NOTE — PATIENT INSTRUCTIONS
SURVEY:    You may be receiving a survey from [employer] regarding your visit today. Please complete the survey to enable us to provide the highest quality of care to you and your family. If you cannot score us a very good on any question, please call the office to discuss how we could have made your experience a very good one. Thank you. Patient Education        Neuropathic Pain: Care Instructions  Your Care Instructions     Neuropathic pain is caused by pressure on or damage to your nerves. It's often simply called nerve pain. Some people feel this type of pain all the time. For others, it comes and goes. Diabetes, shingles, or an injury can cause nerve pain. Many people say the pain feels sharp, burning, or stabbing. But some people feel it as a dull ache. In some cases, it makes your skin very sensitive. So touch, pressure, and other sensations that did not hurt before may now cause pain. It's important to know that this kind of pain is real and can affect your quality of life. It's also important to know that treatment can help. Treatment includes pain medicines, exercise, and physical therapy. Medicines can help reduce the number of pain signals that travel over the nerves. This can make the painful areas less sensitive. It can also help you sleep better and improve your mood. But medicines are only one part of successful treatment. Most people do best with more than one kind of treatment. Your doctor may recommend that you try cognitive-behavioral therapy and stress management. Or, if needed, you may decide to try to quit smoking, lower your blood pressure, or better control blood sugar. These kinds of healthy changes can also make a difference. If you feel that your treatment is not working, talk to your doctor. And be sure to tell your doctor if you think you might be depressed or anxious. These are common problems that can also be treated.   Follow-up care is a key part of your treatment liver disease and have to limit fluids, talk with your doctor before you increase the amount of fluids you drink. · Get some exercise every day. Build up slowly to 30 to 60 minutes a day on 5 or more days of the week. · Take a fiber supplement, such as Citrucel or Metamucil, every day if needed. Read and follow all instructions on the label. · Schedule time each day for a bowel movement. Having a daily routine may help. Take your time and do not strain when having a bowel movement. · Ask your doctor about a laxative. The goal is to have one easy bowel movement every 1 to 2 days. Do not let constipation go untreated for more than 3 days. When should you call for help? Call your doctor now or seek immediate medical care if:  · You feel sad, anxious, or hopeless for more than a few days. This could mean you are depressed. Depression is common in people who have a lot of pain. But it can be treated. · You have trouble with bowel movements, such as:  ? No bowel movement in 3 days. ? Blood in the anal area, in your stool, or on the toilet paper. ? Diarrhea for more than 24 hours. Watch closely for changes in your health, and be sure to contact your doctor if:  · Your pain is getting worse. · You can't sleep because of pain. · You are very worried or anxious about your pain. · You have trouble taking your pain medicine. · You have any concerns about your pain medicine or its side effects. · You have vomiting or cramps for more than 2 hours. Where can you learn more? Go to https://[facility] [employer]. org and sign in to your sharing.it account. Enter I395 in the [facility] box to learn more about \"Neuropathic Pain: Care Instructions. \"     If you do not have an account, please click on the \"Sign Up Now\" link. Current as of: November 20, 2019               Content Version: 12.5  © 4957-3016 [employer]wise, Incorporated. Care instructions adapted under license by [facility] ([facility]).  If you have questions about a medical condition or this instruction, always ask your healthcare professional. [place] [address] any warranty or liability for your use of this information.

## 2020-09-24 PROBLEM — A41.9 SEPSIS WITHOUT ACUTE ORGAN DYSFUNCTION (HCC): Status: ACTIVE | Noted: 2020-01-01

## 2020-09-24 PROBLEM — I63.9 ACUTE CVA (CEREBROVASCULAR ACCIDENT) (HCC): Status: ACTIVE | Noted: 2020-01-01

## 2020-09-24 PROBLEM — R77.8 ELEVATED TROPONIN: Status: ACTIVE | Noted: 2020-01-01

## 2020-09-24 PROBLEM — R40.0 SOMNOLENCE: Status: ACTIVE | Noted: 2020-01-01

## 2020-09-24 NOTE — ED NOTES
Bed: 03  Expected date:   Expected time:   Means of arrival:   Comments:  Medic [address] [facility] ABBY Silva  09/24/20 3436

## 2020-09-24 NOTE — ED PROVIDER NOTES
[facility] ED  EMERGENCY DEPARTMENT ENCOUNTER      Pt Carlton Conley  MRN: [id number]  [place] 1945  Date of evaluation: 9/24/2020  Provider: Yadira Sales MD    CHIEF COMPLAINT     Chief Complaint   Patient presents with    Altered Mental Status     increased confusion onset today.  Extremity Weakness     new onset of right sided weakness today per NH staff    Urinary Tract Infection     +nitrites and leucocytes in urine dip today. HISTORY OF PRESENT ILLNESS   (Location/Symptom, Timing/Onset, Context/Setting, Quality, Duration, Modifying Factors, Severity)  Note limiting factors. HPI the patient is a 79-year-old male who was sent to the emergency department for altered mental status and right-sided weakness today. They also believe he has a urinary tract infection from doing urine dip stick. Patient is not answering any questions and is minimally responsive to pain. Patient has a Chris Coma Scale of 7. Nursing Notes were reviewed. REVIEW OF SYSTEMS    (2-9 systems for level 4, 10 or more for level 5)     Review of Systems   Constitutional: Positive for activity change. HENT: Positive for congestion. Negative for trouble swallowing. Eyes: Negative for discharge. Respiratory: Negative for choking. Gastrointestinal: Negative for abdominal distention. Genitourinary: Negative for dysuria. Skin: Negative for rash. Neurological: Positive for weakness. Psychiatric/Behavioral: Positive for decreased concentration.               MEDICAL HISTORY     Past Medical History:   Diagnosis Date    BPH (benign prostatic hyperplasia)     Cerebral artery occlusion with cerebral infarction ([facility] [address]     Depression     Diabetes mellitus (HCC)     Dorsalgia     Hypertension     Incontinence of feces     Lumbar radiculopathy     TIA (transient ischemic attack)     Urinary retention          SURGICAL HISTORY       Past Surgical History:   Procedure Laterality Date    NERVE SURGERY Left 10/15/2019    LUMBAR FACET-L4-L5, L5-SI performed by Gt Zuniga MD at [address] [place] [address]       Previous Medications    ACETAMINOPHEN (TYLENOL) 500 MG TABLET    Take 1,000 mg by mouth 3 times daily as needed for Pain    AMLODIPINE (NORVASC) 10 MG TABLET    Take 10 mg by mouth daily    AMMONIUM LACTATE (AMLACTIN) 12 % CREAM    Apply topically nightly Apply topically as needed. BISACODYL (DULCOLAX) 10 MG SUPPOSITORY    Place 10 mg rectally daily as needed for Constipation    DOCUSATE SODIUM (COLACE) 100 MG CAPSULE    Take 100 mg by mouth daily     GABAPENTIN (NEURONTIN) 300 MG CAPSULE    Take 300 mg by mouth 3 times daily. GLIMEPIRIDE (AMARYL) 2 MG TABLET    Take 2 mg by mouth 2 times daily     GLUCAGON, RDNA, 1 MG INJECTION    Inject 1 mg into the muscle    GLUCOSE (GLUTOSE) 40 % GEL    Take by mouth    HYDRALAZINE (APRESOLINE) 25 MG TABLET    Take 25 mg by mouth 3 times daily    INSULIN GLARGINE (LANTUS) 100 UNIT/ML INJECTION VIAL    Inject 10 Units into the skin nightly    INSULIN LISPRO (HUMALOG) 100 UNIT/ML INJECTION VIAL    Inject into the skin USE PER SLIDING SCALE QID    LOSARTAN-HYDROCHLOROTHIAZIDE (HYZAAR) 100-25 MG PER TABLET    Take 1 tablet by mouth daily    MAGNESIUM HYDROXIDE (MILK OF MAGNESIA) 400 MG/5ML SUSPENSION    Take 30 mLs by mouth    METFORMIN (GLUCOPHAGE) 500 MG TABLET    Take 500 mg by mouth 2 times daily (with meals)    POLYETHYLENE GLYCOL (GLYCOLAX) PACKET    Take 17 g by mouth    SERTRALINE (ZOLOFT) 100 MG TABLET    Take 200 mg by mouth daily     SODIUM PHOSPHATES (FLEET) 7-19 GM/118ML    Place 1 enema rectally once as needed    TAMSULOSIN (FLOMAX) 0.4 MG CAPSULE    Take 1 capsule by mouth daily    VERAPAMIL (CALAN SR) 240 MG EXTENDED RELEASE TABLET    Take 240 mg by mouth nightly       ALLERGIES     Patient has no known allergies. FAMILY HISTORY     No family history on file.        SOCIAL HISTORY       Social History Socioeconomic History    Marital status:      Spouse name: Not on file    Number of children: Not on file    Years of education: Not on file    Highest education level: Not on file   Occupational History    Not on file   Social Needs    Financial resource strain: Not on file    Food insecurity     Worry: Not on file     Inability: Not on file    Transportation needs     Medical: Not on file     Non-medical: Not on file   Tobacco Use    Smoking status: Never Smoker    Smokeless tobacco: Never Used   Substance and Sexual Activity    Alcohol use: No    Drug use: No    Sexual activity: Not on file   Lifestyle    Physical activity     Days per week: Not on file     Minutes per session: Not on file    Stress: Not on file   Relationships    Social connections     Talks on phone: Not on file     Gets together: Not on file     Attends [employer] service: Not on file     Active member of club or organization: Not on file     Attends meetings of clubs or organizations: Not on file     Relationship status: Not on file    Intimate partner violence     Fear of current or ex partner: Not on file     Emotionally abused: Not on file     Physically abused: Not on file     Forced sexual activity: Not on file   Other Topics Concern    Not on file   Social History Narrative    Not on file       SCREENINGS             PHYSICAL EXAM  (up to 7 for level 4, 8 or more for level 5)     ED Triage Vitals [09/24/20 1808]   BP Temp Temp Source Pulse Resp SpO2 Height Weight   (!) 147/64 98.1 °F (36.7 °C) Tympanic 79 18 95 % -- --       Physical Exam  Constitutional:       Appearance: He is ill-appearing. Comments: Minimally responsive   HENT:      Head: Normocephalic and atraumatic. Eyes:      Pupils: Pupils are equal, round, and reactive to light. Neck:      Musculoskeletal: Normal range of motion and neck supple. Cardiovascular:      Rate and Rhythm: Normal rate and regular rhythm.       Heart sounds: Normal heart sounds. Pulmonary:      Effort: Pulmonary effort is normal.      Breath sounds: Normal breath sounds. Abdominal:      General: There is no distension. Palpations: Abdomen is soft. Tenderness: There is no abdominal tenderness. Skin:     General: Skin is warm and dry. Neurological:      Mental Status: He is confused. GCS: GCS eye subscore is 4. GCS verbal subscore is 1. GCS motor subscore is 2. Cranial Nerves: No cranial nerve deficit. Motor: Weakness present. Gait: Gait abnormal.   Psychiatric:         Cognition and Memory: Memory is impaired.          DIAGNOSTIC RESULTS     EKG: All EKG's are interpreted by the Emergency Department Physician whoeither signs or Co-signs this chart in the absence of a cardiologist.      RADIOLOGY:   Non-plain film images such as CT, Ultrasound and MRI are read by the radiologist. Plain radiographic images are visualized and preliminarily interpreted by the emergency physician     Interpretation per the Radiologist below, if available at the time of this note:    XR CHEST 1 VIEW    (Results Pending)   CT HEAD WO CONTRAST    (Results Pending)         ED BEDSIDE ULTRASOUND:   Performed by ED Physician - none    LABS:  Labs Reviewed   CBC WITH AUTO DIFFERENTIAL - Abnormal; Notable for the following components:       Result Value    RBC 2.75 (*)     Hemoglobin 8.0 (*)     Hematocrit 25.1 (*)     Seg Neutrophils 68 (*)     Lymphocytes 14 (*)     Monocytes 15 (*)     Absolute Lymph # 0.86 (*)     All other components within normal limits   BASIC METABOLIC PANEL - Abnormal; Notable for the following components:    Glucose 123 (*)     BUN 45 (*)     CREATININE 2.39 (*)     Calcium 8.2 (*)     GFR Non- 27 (*)     GFR  32 (*)     All other components within normal limits   TROPONIN - Abnormal; Notable for the following components:    Troponin, High Sensitivity 115 (*)     All other components within normal limits CULTURE, BLOOD 2   CULTURE, BLOOD 1   LACTATE, SEPSIS   URINALYSIS WITH MICROSCOPIC   TROPONIN   TROPONIN       EMERGENCY DEPARTMENT COURSE and DIFFERENTIAL DIAGNOSIS/MDM:   Vitals:    Vitals:    09/24/20 1808   BP: (!) 147/64   Pulse: 79   Resp: 18   Temp: 98.1 °F (36.7 °C)   TempSrc: Tympanic   SpO2: 95%           MDM the patient's Glascow coma scale was a 7. I spoke with patient's wife Woody Morelos and she decided that he would want to be intubated. The patient was moved to room 1 and preparations were being made to intubate him. Patient became more alert he was able to say where he is what his birthday is what his name is and he was adamant that he did not want to be intubated. Even if it put his airway at risk, he did not want to be intubated. Jessica Ocampo and Shyanne to have our nurses and Yohana the respiratory tech were all present when he refused intubation several times. It is noted that his troponin is significantly elevated. His EKG does not show any new ischemic change. CONSULTS:  None    PROCEDURES:  Unless otherwise noted below, none     Procedures    FINAL IMPRESSION      1. Elevated troponin    2. Somnolence    3. Sepsis without acute organ dysfunction, due to unspecified organism ([facility] [address]          DISPOSITION/PLAN   DISPOSITION        PATIENT REFERRED TO:  No follow-up provider specified.     DISCHARGE MEDICATIONS:  New Prescriptions    No medications on file              (Please note that portions ofthis note were completed with a voice recognition program.  Efforts were made to edit the dictations but occasionally words are mis-transcribed.)      Charles Newsome MD (electronically signed)  Attending Emergency Physician          Sumeet Ro MD  09/24/20 1911       Sumeet Ro MD  09/24/20 Luc Mera MD  10/05/20 5040

## 2020-09-24 NOTE — ED NOTES
Dr. Talya Roper at bedside. Pt is more alert and talking without any painful stimuli at this time. Dr. Talya Roper lets the patient know that we are thinking about intubating the patient. Pt states she does not want to be intubated. Dr. Talya Roper asks the patient if he becomes unresponsive and needs to be intubated, would it be okay to intubate. Pt says no. Myself, Ness MORA, and Dr. Talya Roper were all at bedside when this happened.       Osmani Espinoza RN  09/24/20 1920

## 2020-09-25 PROBLEM — R41.82 ALTERED MENTAL STATUS: Status: ACTIVE | Noted: 2020-01-01

## 2020-09-25 PROBLEM — G45.9 TIA (TRANSIENT ISCHEMIC ATTACK): Status: ACTIVE | Noted: 2020-01-01

## 2020-09-25 PROBLEM — N17.9 AKI (ACUTE KIDNEY INJURY) (HCC): Status: ACTIVE | Noted: 2020-01-01

## 2020-09-25 NOTE — PROGRESS NOTES
[facility] SPECIALTY [facility]  OCCUPATIONAL THERAPY  No Visit Note    [] ICU    [x] Acute   Patient: Emil Masterson  Room: 3441/1995-15      Emil Masterson not seen on 9/25/2020 at 1:50 PM. Per nursing, pt troponin levels have been increasing. Pt is nonambulatory and states that he \"does nothing at [employer]. \" Dr. Jake Villafana also present and requesting to hold therapy at this time. Will re-attempt tomorrow.         Signature: Dina Leon, ZEYAD, OTR/L

## 2020-09-25 NOTE — PLAN OF CARE
Nutrition Problem #1: Altered nutrition-related lab values  Intervention: Food and/or Nutrient Delivery: Continue Current Diet  Nutritional Goals: PO > 75% of meals

## 2020-09-25 NOTE — PROGRESS NOTES
Patient incontinent of stool. Patient is hard to clean up due to swinging at nurses, cussing at nurses and pushing back when trying to roll him. Cream applied to scrotal area. Patient vitals obtained.

## 2020-09-25 NOTE — PROGRESS NOTES
Interventions:   Food and/or Nutrient Delivery:  Continue Current Diet  Nutrition Education/Counseling:  Education not indicated   Coordination of Nutrition Care:  Continued Inpatient Monitoring    Goals:  PO > 75% of meals     Recent Labs     09/24/20  1831 09/25/20  0603    138   K 4.5 3.9    107   CO2 23 23   BUN 45* 42*   CREATININE 2.39* 2.33*   GLUCOSE 123* 118*   ALT  --  6   ALKPHOS  --  73   GFR                            Lab Results   Component Value Date    LABALBU 2.5 09/25/2020      Lab Results   Component Value Date    LABA1C 7.4 12/23/2019     Lab Results   Component Value Date    TRIG 124 12/23/2019    HDL 32 12/23/2019     Recent Labs     09/25/20  1133   POCGLU 216*     Nutrition Monitoring and Evaluation:   Behavioral-Environmental Outcomes:  Knowledge or Skill   Food/Nutrient Intake Outcomes:  Food and Nutrient Intake  Physical Signs/Symptoms Outcomes:  Biochemical Data, Weight     Discharge Planning:    No discharge needs at this time     Electronically signed by Artemio Paredes RD, LD on 9/25/20 at 11:55 AM EDT    Contact: [phone]

## 2020-09-25 NOTE — CONSULTS
Isak Wong am scribing for and in the presence of Pamela Teixeira MD, F.A.C.C..    Patient: Brice Sargent  : 1945  Date of Admission: 2020  Primary Care Physician: Guero Onofre  Today's Date: 2020    REASON FOR CONSULTATION: Altered Mental Status (increased confusion onset today. ); Extremity Weakness (new onset of right sided weakness today per NH staff); and Urinary Tract Infection (+nitrites and leucocytes in urine dip today. )      HPI: Mr. Sania Britt is a 70 y.o. male with no prior cardiac history who was sent from the nursing home because of altered mental status, weakness and urinary tract infection. Cardiology consulted because of elevated troponin. Patient is drowsy and sleepy but responds to questions appropriately. He denies any prior history of heart disease. He knew that he has chronic kidney disease for the past several month. He has history of diabetes and hypertension. He is not aware of any family history of premature heart disease. He is lifelong non-smoker. He is staying at [facility]. He does snore but has never used a CPAP machine. Exercise Tolerance: Mr. Sania Britt reports that he has a fair exercise tolerance. His says that he could walk around at [facility]. Goes to the bathroom and to the dining area. As a stated above, he is drowsy but responds appropriately to questions. I think he is reversing his CODE STATUS to full code. He said he does not mind having chest compression or being on a ventilator. He denies any chest pain, pressure or tightness. No significant shortness of breath. No palpitations or dizziness prior to his current admission. He reported no nausea, vomiting or abdominal pain. He does not really know why he is in the hospital but he said the nursing home staff wanted him to be here. He feels tired and sleepy. Labs reviewed, creatinine is 2.39.   Baseline creatinine several months ago was 1.5 and back in December 2019 it was around 1.1. His hemoglobin is 7.3, he is not aware of any external bleeding. CT of the brain showed no acute abnormalities. Chest x-ray is unremarkable. I get an ultrasound of the kidneys. Renal cyst otherwise unremarkable. ECGs reviewed, no acute ischemic changes. Pending echo.    ]  Past Medical History:   Diagnosis Date    BPH (benign prostatic hyperplasia)     Cerebral artery occlusion with cerebral infarction ([address]     Depression     Diabetes mellitus (HCC)     Dorsalgia     Hypertension     Incontinence of feces     Lumbar radiculopathy     TIA (transient ischemic attack)     Urinary retention     UTI (urinary tract infection)        CURRENT ALLERGIES: Patient has no known allergies. REVIEW OF SYSTEMS: Limited review of system because patient is sleepy and drowsy but he was able to answer simple yes/no questions. Positive and negative are mentioned in HPI. Past Surgical History:   Procedure Laterality Date    NERVE SURGERY Left 10/15/2019    LUMBAR FACET-L4-L5, L5-SI performed by Tabby Naik MD at [address] History:  Social History     Tobacco Use    Smoking status: Never Smoker    Smokeless tobacco: Never Used   Substance Use Topics    Alcohol use: No    Drug use: No        CURRENT MEDICATIONS:  Outpatient Medications Marked as Taking for the 9/24/20 encounter [facility] Encounter)   Medication Sig Dispense Refill    losartan (COZAAR) 100 MG tablet Take 100 mg by mouth daily      NONFORMULARY Take by mouth daily hyzaar 100-25mg oral      loperamide (IMODIUM) 2 MG capsule Take 2 mg by mouth 4 times daily as needed for Diarrhea      insulin aspart (NOVOLOG FLEXPEN) 100 UNIT/ML injection pen Inject into the skin 4 times daily Sliding scale      amLODIPine (NORVASC) 10 MG tablet Take 10 mg by mouth daily      ammonium lactate (AMLACTIN) 12 % cream Apply topically nightly Apply topically as needed.       hydrALAZINE (APRESOLINE) 25 MG tablet Take 50 mg by mouth 3 times daily       glimepiride (AMARYL) 2 MG tablet Take 2 mg by mouth every morning (before breakfast)       insulin glargine (LANTUS) 100 UNIT/ML injection vial Inject 15 Units into the skin nightly       magnesium hydroxide (MILK OF MAGNESIA) 400 MG/5ML suspension Take 30 mLs by mouth      polyethylene glycol (GLYCOLAX) packet Take 17 g by mouth      sertraline (ZOLOFT) 100 MG tablet Take 200 mg by mouth daily       gabapentin (NEURONTIN) 300 MG capsule Take 300 mg by mouth 3 times daily.  tamsulosin (FLOMAX) 0.4 MG capsule Take 1 capsule by mouth daily 30 capsule 3    verapamil (CALAN SR) 240 MG extended release tablet Take 240 mg by mouth nightly      docusate sodium (COLACE) 100 MG capsule Take 100 mg by mouth daily       metFORMIN (GLUCOPHAGE) 500 MG tablet Take 500 mg by mouth 2 times daily (with meals)         gabapentin (NEURONTIN) capsule 300 mg, TID  glimepiride (AMARYL) tablet 2 mg, BID WC  sertraline (ZOLOFT) tablet 200 mg, Daily  tamsulosin (FLOMAX) capsule 0.4 mg, Daily  amLODIPine (NORVASC) tablet 10 mg, Daily  insulin lispro (HUMALOG) injection vial 4-10 Units, TID AC  insulin glargine (LANTUS) injection vial 15 Units, Nightly  0.9 % sodium chloride infusion, Continuous  sodium chloride flush 0.9 % injection 10 mL, 2 times per day  sodium chloride flush 0.9 % injection 10 mL, PRN  acetaminophen (TYLENOL) tablet 650 mg, Q6H PRN    Or  acetaminophen (TYLENOL) suppository 650 mg, Q6H PRN  polyethylene glycol (GLYCOLAX) packet 17 g, Daily PRN  promethazine (PHENERGAN) tablet 12.5 mg, Q6H PRN    Or  ondansetron (ZOFRAN) injection 4 mg, Q6H PRN  enoxaparin (LOVENOX) injection 40 mg, Daily  clopidogrel (PLAVIX) tablet 75 mg, Daily  losartan (COZAAR) tablet 100 mg, Daily    And  hydroCHLOROthiazide (HYDRODIURIL) tablet 25 mg, Daily  0.9 % sodium chloride infusion, Continuous         FAMILY HISTORY: No family history of premature CAD.     PHYSICAL EXAM:   BP (!) 149/54   Pulse 82 Temp 98.7 °F (37.1 °C) (Temporal)   Resp 18   Ht 6' 1\" (1.854 m)   Wt 225 lb 5 oz (102.2 kg)   SpO2 94%   BMI 29.73 kg/m²  Body mass index is 29.73 kg/m². Constitutional: He is sleepy but  arousable and is able to answer questions appropriately  HEENT: Pale, no JVD, no carotid bruit. No thyromegaly or lymphadenopathy  Cardiovascular: Normal rate, regular rhythm, normal heart sounds. Exam reveals no gallop and no friction rubs. No heart murmur heard. Pulmonary/Chest: Effort normal and breath sounds normal. No respiratory distress. He has no wheezes, rhonchi or rales. Abdominal: Soft, non-tender. Bowel sounds and aorta are normal. He exhibits no organomegaly, mass or bruit. Extremities: No edema. No cyanosis and no clubbing. Pulses are 2+ radial and carotid pulses. 2+ dorsalis pedis and posterior tibial pulses bilaterally. Skin: Skin is warm and dry. There is no rash or diaphoresis. Psychiatric: He has a normal mood and affect.  His speech is normal and behavior is normal.      MOST RECENT LABS ON RECORD:   Lab Results   Component Value Date    WBC 4.5 09/25/2020    HGB 7.3 (L) 09/25/2020    HCT 23.5 (L) 09/25/2020     09/25/2020    CHOL 127 12/23/2019    TRIG 124 12/23/2019    HDL 32 (L) 12/23/2019    LDLCHOLESTEROL 70 12/23/2019    ALT 6 09/25/2020    AST 8 09/25/2020     09/25/2020    K 3.9 09/25/2020     09/25/2020    CREATININE 2.33 (H) 09/25/2020    BUN 42 (H) 09/25/2020    CO2 23 09/25/2020    TSH 1.45 10/25/2019    PSA 4.21 (H) 03/30/2020    INR 1.0 09/07/2019    LABA1C 7.4 (H) 12/23/2019        ASSESSMENT:  Patient Active Problem List    Diagnosis Date Noted    Sepsis without acute organ dysfunction ([facility] [address] 09/24/2020    Somnolence 09/24/2020    Elevated troponin 09/24/2020    Acute CVA (cerebrovascular accident) ([address] 09/24/2020    BPH with obstruction/lower urinary tract symptoms 10/16/2019    Neurogenic bladder 10/16/2019    Other constipation 10/16/2019    Spondylosis of lumbosacral region without myelopathy or radiculopathy 10/15/2019    Urinary retention 09/07/2019    Fecal incontinence 09/07/2019    H/O: CVA (cerebrovascular accident) 09/06/2019    Lumbar radiculopathy 09/05/2019    Hypertension     Diabetes mellitus ([facility] [address]     Intractable back pain 09/04/2019       PLAN:  · Elevated troponin: 124 ng/L yesterday 9/24/2020  · I think elevated troponin secondary to acute on chronic renal failure. · Patient denies any chest pain, pressure or tightness. · No significant shortness of breath. · ECGs reviewed, no acute ischemic changes. · We will continue to trend troponin and get a repeat ECG tomorrow morning. Medical management of presumed CAD  Antiplatelet Agent: Continue Plavix for now. Beta Blocker: Not indicated at this time. Pending echo we may end up starting him on low-dose metoprolol. Anti-anginal medications: Continue amlodipine (Norvasc) 10 mg once daily. Check hemoglobin A1c and lipid profile. Check liver function. We will start him on low-dose statin therapy giving history of TIA and diabetes regardless. Follow-up echo. · Acute renal failure:  · Increase intravenous fluid hydration to 125 mL/h  ·  kidney ultrasound was unremarkable apart from renal cyst, no obstructive uropathy  · I placed an order for nephrology consultation. Anemia: Please consider transfusion if hemoglobin less than 7. I will send anemia work-up.   Essential Hypertension: Controlled  · Beta Blocker: Not indicated at this time. · ACE Inibitor/ARB: Hold losartan given acute renal failure  · Diuretics: Hold hydrochlorothiazide, suspect renal failure secondary to prerenal etiology. · Calcium Channel Blocker: Continue amlodipine (Norvasc) 10 mg once daily. · Start hydralazine 50 mg 3 times daily  · We will continue to follow. Once again, thank you for allowing me to participate in this patients care.  Please do not hesitate to contact me could I be of

## 2020-09-25 NOTE — H&P
[facility]  History and Physical        Patient:  Rasheed Lee  MRN: [id number]    Chief Complaint:  Altered mental status with rt sided weakness    History Obtained From:  patient, electronic medical record    PCP: Leana Mcardle, MD    History of Present Illness: The patient is a 70 y.o. male who presents with rt sided weakness with altered mental status. He woke up while he was going to be intubated and then he declined intubation. He was noticed to have rt sided weakness at nursing home but has no weakness now. He does have chronic weakness due to his back problems. Past Medical History:        Diagnosis Date    BPH (benign prostatic hyperplasia)     [facility]erebral artery occlusion with cerebral infarction ([facility]     Depression     Diabetes mellitus (HCC)     Dorsalgia     Hypertension     Incontinence of feces     Lumbar radiculopathy     TIA (transient ischemic attack)     Urinary retention     UTI (urinary tract infection)        Past Surgical History:        Procedure Laterality Date    NERVE SURGERY Left 10/15/2019    LUMBAR FACET-L4-L5, L5-SI performed by Josesito Lowery MD at [facility]       Family History:   History reviewed. No pertinent family history. Social History:   TOBACCO:   reports that he has never smoked. He has never used smokeless tobacco.  ETOH:   reports no history of alcohol use. OCCUPATION: nopne    Allergies:  Patient has no known allergies. Medications Prior to Admission:    Prior to Admission medications    Medication Sig Start Date End Date Taking?  Authorizing Provider   losartan (COZAAR) 100 MG tablet Take 100 mg by mouth daily   Yes Historical Provider, MD   NONFORMULARY Take by mouth daily hyzaar 100-25mg oral   Yes Historical Provider, MD   loperamide (IMODIUM) 2 MG capsule Take 2 mg by mouth 4 times daily as needed for Diarrhea   Yes Historical Provider, MD   insulin aspart (NOVOLOG FLEXPEN) 100 UNIT/ML injection pen Inject into the skin 4 times daily Sliding scale   Yes Historical Provider, MD   amLODIPine (NORVASC) 10 MG tablet Take 10 mg by mouth daily   Yes Historical Provider, MD   ammonium lactate (AMLACTIN) 12 % cream Apply topically nightly Apply topically as needed. Yes Historical Provider, MD   hydrALAZINE (APRESOLINE) 25 MG tablet Take 50 mg by mouth 3 times daily    Yes Historical Provider, MD   glimepiride (AMARYL) 2 MG tablet Take 2 mg by mouth every morning (before breakfast)  4/21/20  Yes Historical Provider, MD   insulin glargine (LANTUS) 100 UNIT/ML injection vial Inject 15 Units into the skin nightly    Yes Historical Provider, MD   magnesium hydroxide (MILK OF MAGNESIA) 400 MG/5ML suspension Take 30 mLs by mouth 9/4/19  Yes Historical Provider, MD   polyethylene glycol (GLYCOLAX) packet Take 17 g by mouth 9/7/19  Yes Historical Provider, MD   sertraline (ZOLOFT) 100 MG tablet Take 200 mg by mouth daily  10/3/19  Yes Historical Provider, MD   gabapentin (NEURONTIN) 300 MG capsule Take 300 mg by mouth 3 times daily.    Yes Historical Provider, MD   tamsulosin (FLOMAX) 0.4 MG capsule Take 1 capsule by mouth daily 9/12/19  Yes Ondina Baugh MD   verapamil (CALAN SR) 240 MG extended release tablet Take 240 mg by mouth nightly   Yes Historical Provider, MD   docusate sodium (COLACE) 100 MG capsule Take 100 mg by mouth daily    Yes Historical Provider, MD   metFORMIN (GLUCOPHAGE) 500 MG tablet Take 500 mg by mouth 2 times daily (with meals)   Yes Historical Provider, MD   Sodium Phosphates (FLEET) 7-19 GM/118ML Place 1 enema rectally once as needed    Historical Provider, MD   glucose (GLUTOSE) 40 % GEL Take 15 g by mouth as needed  9/4/19   Historical Provider, MD   glucagon, rDNA, 1 MG injection Inject 1 mg into the muscle 9/4/19   Historical Provider, MD   bisacodyl (DULCOLAX) 10 MG suppository Place 10 mg rectally daily as needed for Constipation    Historical Provider, MD   acetaminophen (TYLENOL) 500 MG tablet Take 1,000 mg by mouth 3 times daily as needed for Pain    Historical Provider, MD       Review of Systems:  Constitutional:negative  for fevers, and negative for chills. Eyes: negative for visual disturbance   ENT: negative for sore throat, negative nasal congestion, and negative for earache  Respiratory: negative for shortness of breath, negative for cough, and negative for wheezing  Cardiovascular: negative for chest pain, negative for palpitations, and negative for syncope  Gastrointestinal: negative for abdominal pain, negative for nausea,negative for vomiting, negative for diarrhea, negative for constipation, and negative for hematochezia or melena  Genitourinary: negative for dysuria, negative for urinary urgency, negative for urinary frequency, and negative for hematuria  Skin: negative for skin rash, and negative for skin lesions  Neurological: positive for bilateral  Lower extremity weakness    Physical Exam:    Vitals:   Vitals:    09/25/20 1316   BP: (!) 149/54   Pulse: 82   Resp: 18   Temp: 98.7 °F (37.1 °C)   SpO2: 94%     Weight: 225 lb 5 oz (102.2 kg)   Height: 6' 1\" (185.4 cm)     GEN:  alert and oriented to person, place and time, well-developed and well-nourished, in no acute distress  EYES: No gross abnormalities.  and PERRL  NECK: normal, supple, no lymphadenopathy,  no carotid bruits  PULM: clear to auscultation bilaterally- no wheezes, rales or rhonchi, normal air movement, no respiratory distress  COR: regular rate & rhythm, no murmurs and no gallops  ABD:  soft, non-tender, non-distended, normal bowel sounds, no masses or organomegaly  EXT:   no cyanosis, clubbing or edema present    NEURO: has lower extremity weakness,dtr normal,crania nerves intact  SKIN:  no rashes or significant lesions  -----------------------------------------------------------------  Diagnostic Data:   Lab Results   Component Value Date    WBC 4.5 09/25/2020    HGB 7.3 (L) 09/25/2020     09/25/2020       Lab Results Component Value Date    BUN 42 (H) 09/25/2020    CREATININE 2.33 (H) 09/25/2020     09/25/2020    K 3.9 09/25/2020    CALCIUM 8.0 (L) 09/25/2020     09/25/2020    CO2 23 09/25/2020    LABGLOM 28 (L) 09/25/2020       Lab Results   Component Value Date    WBCUA 2 TO 5 09/24/2020    RBCUA 0 TO 2 09/24/2020    EPITHUA 2 TO 5 09/24/2020    LEUKOCYTESUR NEGATIVE 09/24/2020    SPECGRAV 1.025 (H) 09/24/2020    GLUCOSEU TRACE (A) 09/24/2020    KETUA NEGATIVE 09/24/2020    PROTEINU 4+ (A) 09/24/2020    HGBUR TRACE (A) 09/24/2020    CASTUA NOT REPORTED 09/24/2020    CRYSTUA NOT REPORTED 09/24/2020    BACTERIA TRACE (A) 09/24/2020    YEAST NOT REPORTED 09/24/2020       Lab Results   Component Value Date    TROPONINT NOT REPORTED 09/24/2020    CKTOTAL 45 10/25/2019       Us Retroperitoneal Limited    Result Date: 9/25/2020  EXAMINATION: ULTRASOUND OF THE KIDNEYS 9/25/2020 2:10 pm COMPARISON: CT abdomen and pelvis of 11 June 2020 HISTORY: ORDERING SYSTEM PROVIDED HISTORY: Acute on chronic renal failure TECHNOLOGIST PROVIDED HISTORY: Acute on chronic renal failure FINDINGS: The right kidney measures 12.33 x 5.62 x 4.77 cm. The left kidney measures 11.5 x 4.85 x 5.17 cm. Visualization of the left kidney is limited due to bowel gas and body habitus. A cyst in the lateral mid pole of the right kidney of 6.22 x 6.12 x 6.2 cm is noted. No hydronephrosis or nephrolithiasis is demonstrated. No cortical thinning is demonstrated. Right renal cyst.  Otherwise unremarkable ultrasound appearance of the kidneys. Assessment:    Principal Problem:    Altered mental status  Active Problems:    Sepsis without acute organ dysfunction (HCC)    Somnolence    Elevated troponin    TIA (transient ischemic attack)    LISANDRA (acute kidney injury) ([facility]  Resolved Problems:    * No resolved hospital problems.  *      Patient Active Problem List    Diagnosis Date Noted    Altered mental status 09/25/2020    LISANDRA (acute kidney injury) ([facility] 09/25/2020    Sepsis without acute organ dysfunction ([facility] 09/24/2020    Somnolence 09/24/2020    Elevated troponin 09/24/2020    TIA (transient ischemic attack) 09/24/2020    BPH with obstruction/lower urinary tract symptoms 10/16/2019    Neurogenic bladder 10/16/2019    Other constipation 10/16/2019    Spondylosis of lumbosacral region without myelopathy or radiculopathy 10/15/2019    Urinary retention 09/07/2019    Fecal incontinence 09/07/2019    H/O: CVA (cerebrovascular accident) 09/06/2019    Lumbar radiculopathy 09/05/2019    Hypertension     Diabetes mellitus ([facility]     Intractable back pain 09/04/2019       Plan:     · This patient requires inpatient admission because of altered mental status requiring monitoring for infection,neurological progression  · Factors affecting the medical complexity of this patient include elevated troponin, transint weakness,LISANDRA,chronic back pain and lower extremity weakness  · Estimated length of stay is 2 days  ·   · High risk medication monitoring: none    CORE MEASURES  DVT prophylaxis: Lovenox  Decubitus ulcer present on admission: No  CODE STATUS: DNR-CC  Nutrition Status: fair   Physical therapy: Yes   Old Charts reviewed: Yes  EKG Reviewed:  Yes  Advance Directive Addressed: Yes  Total time spent in evaluating this patient and formulating plan of care 25 minutes  Ramsey Suárez MD  9/25/2020, 5:00 PM

## 2020-09-25 NOTE — PROGRESS NOTES
Patient brought from the ER. Patient was transported from Er cart to Bed with nursing assist x3. Patient is non ambulatory. Patient is awake, alert to name, disoriented to all else at this time. Patient was incontinent of large amount of urine and stool, He was changed at this time with the help of ER nurse and [facility] nurses. Patient is red in scrotal area. Patient does c/o left hip and back pain. Patient vitals were obtained at 2359 BP elevated, patient is in pain Writer to medicate patient when admission completed. Assessment completed, see charting. Patient does make several statements about killing himself and when suicide assessment completed it scores high. Patient would not verbalize his plan to this writer. [address] supervisor was notified of precautions and high score. Patient is tearful at times.     Patient navigator completed  Patient not appropriate for bed side tablet  Patient orders to be reviewed and followed

## 2020-09-25 NOTE — PROGRESS NOTES
[facility]  Inpatient/Observation/Outpatient Rehabilitation    Date: 2020  Patient Name: Mercedez Woo       [x] Inpatient Acute/Observation       []  Outpatient  : 1945        [x] Pt cancelled due to:  [] No Reason Given   [] Sick/ill   [] Other: Pt awaiting cardiology consult due to increase troponin level. Dr Vinny Mulligan present and asked to hold therapies at this time. Will recheck tomorrow. Will attempt evaluation at our earliest opportunity.        César Smyth , PT, DPT, CMPT  Date: 2020

## 2020-09-25 NOTE — DISCHARGE INSTR - COC
Continuity of Care Form    Patient Name: Hermilo Mir   :  1945  MRN:  [id number]    Admit date:  2020  Discharge date:  10/01/20    Code Status Order: [facility]   Advance Directives:   [facility] Documentation       Date/Time Healthcare Directive Type of Healthcare Directive Copy in [address] Agent's Name Healthcare Agent's Phone Number    20 0025  Yes, patient has an advance directive for healthcare treatment  Living will;Durable power of  for health care  No, copy requested from family  Spouse  --  --            Admitting Physician:  Viola Moctezuma MD  PCP: Judy Alcala MD    Discharging Nurse: [facility] Unit/Room#: 0327/0327-01  [address] [place] [address] Phone Number: [phone]    Emergency Contact:   Extended Emergency Contact Information  Primary Emergency Contact: Chevalier,Linda  Address: [address] [place] [address]  Home Phone: [phone]  Work Phone: [phone]  Mobile Phone: [phone]  Relation: Spouse  Secondary Emergency Contact: Lexa Ben  Home Phone: [phone]  Relation: Step Child    Past Surgical History:  Past Surgical History:   Procedure Laterality Date    NERVE SURGERY Left 10/15/2019    LUMBAR FACET-L4-L5, L5-SI performed by Kassie Richard MD at [address]       Immunization History: There is no immunization history on file for this patient.     Active Problems:  Patient Active Problem List   Diagnosis Code    Intractable back pain M54.9    Hypertension I10    Diabetes mellitus ([facility] E11.9    Lumbar radiculopathy M54.16    H/O: CVA (cerebrovascular accident) Z80.78    Urinary retention R33.9    Fecal incontinence R15.9    Spondylosis of lumbosacral region without myelopathy or radiculopathy M47.817    BPH with obstruction/lower urinary tract symptoms N40.1, N13.8    Neurogenic bladder N31.9    Other constipation K59.09    Sepsis without acute organ dysfunction (HCC) A41.9    Somnolence R40.0    Elevated troponin R79.89    TIA (transient ischemic attack) G45.9    Altered mental status R41.82    LISANDRA (acute kidney injury) ([facility] N17.9       Isolation/Infection:   Isolation            No Isolation          Patient Infection Status       None to display            Nurse Assessment:  Last Vital Signs: BP (!) 137/55   Pulse 73   Temp 98.4 °F (36.9 °C) (Temporal)   Resp 18   Ht 6' 1\" (1.854 m)   Wt 225 lb 5 oz (102.2 kg)   SpO2 95%   BMI 29.73 kg/m²     Last documented pain score (0-10 scale): Pain Level: 0  Last Weight:   Wt Readings from Last 1 Encounters:   09/24/20 225 lb 5 oz (102.2 kg)     Mental Status:  disoriented    IV Access:  - None    Nursing Mobility/ADLs:  Walking   Dependent  Transfer  Dependent  Bathing  Dependent  Dressing  Dependent  Toileting  Dependent  Feeding  Assisted  Med Admin  Dependent  Med Delivery   whole    Wound Care Documentation and Therapy:  Wound 02/07/17 Abrasion(s) Knee Left (Active)   Number of days: 1325        Elimination:  Continence:   · Bowel: No  · Bladder: No  Urinary Catheter: None   Colostomy/Ileostomy/Ileal Conduit: No       Date of Last BM: ***    Intake/Output Summary (Last 24 hours) at 9/25/2020 1950  Last data filed at 9/25/2020 1740  Gross per 24 hour   Intake 1155 ml   Output --   Net 1155 ml     I/O last 3 completed shifts: In: 081 [I.V.:915]  Out: -     Safety Concerns:     History of Falls (last 30 days)    Impairments/Disabilities:      Vision    Nutrition Therapy:  Current Nutrition Therapy:   - Oral Diet:  Carb Control 4 carbs/meal (1800kcals/day) and Low Sodium (2gm)    Routes of Feeding: Oral  Liquids:  Thin Liquids  Daily Fluid Restriction: no  Last Modified Barium Swallow with Video (Video Swallowing Test): not done    Treatments at the Time of Hospital Discharge:   Respiratory Treatments: ***  Oxygen Therapy:  {Therapy; copd oxygen:75062}  Ventilator:    - No ventilator support    Rehab Therapies: {THERAPEUTIC INTERVENTION:2214462478}  Weight Bearing Status/Restrictions: No weight bearing restirctions  Other Medical Equipment (for information only, NOT a DME order):  wheelchair  Other Treatments: ***    Patient's personal belongings (please select all that are sent with patient):  None    RN SIGNATURE:  Electronically signed by Jeffrey Montenegro on 10/1/20 at 2:27 PM EDT    CASE MANAGEMENT/SOCIAL WORK SECTION    Inpatient Status Date9/24/2020    Readmission Risk Assessment Score:  Readmission Risk              Risk of Unplanned Readmission:        17           Discharging to Facility/ Agency   · Name: [facility]  · Phone:[phone] · [address]    Dialysis Facility (if applicable)   · Name:  · Address:  · Dialysis Schedule:  · Phone:  · Fax:    / signature: {Esignature:[id number]}    PHYSICIAN SECTION    Prognosis: Good    Condition at Discharge: Stable    Rehab Potential (if transferring to Rehab): Good    Recommended Labs or Other Treatments After Discharge: Lab Work on Monday. Send results to Dr. Evelio Ruano, Dr. Yady Cyr, and Dr. Leonardo Mcgrath. Oxygen at 2 L to Maintain SPO2 > 92%           TO [facility] OUTPATIENT DEPARTMENT FOR IV VENOFER INFUSION X 1 DOSE AT  11:00AM         Physician Certification: I certify the above information and transfer of Adrienne Mak  is necessary for the continuing treatment of the diagnosis listed and that he requires [facility] for greater 30 days.      Update Admission H&P: No change in H&P    PHYSICIAN SIGNATURE:  Electronically signed by SEBLE Carpenter CNP on 10/1/20 at 1:53 PM EDT

## 2020-09-25 NOTE — CONSULTS
Palliative Care Notes    Reason for Consult:  Patient and family support, goals of care, symptom management. Patient Active Problem List   Diagnosis    Intractable back pain    Hypertension    Diabetes mellitus ([facility]    Lumbar radiculopathy    H/O: CVA (cerebrovascular accident)    Urinary retention    Fecal incontinence    Spondylosis of lumbosacral region without myelopathy or radiculopathy    BPH with obstruction/lower urinary tract symptoms    Neurogenic bladder    Other constipation    Sepsis without acute organ dysfunction (HCC)    Somnolence    Elevated troponin    Acute CVA (cerebrovascular accident) ([facility]       Advance Directives:  Code status: DNR-CC  Patient has capacity for medical decisions: Jonah Cruz declined to answer questions regarding his mentation  Health Care Power of : yes  Living Will: yes        Pain Management:  The patient is not having any pain. Symptom Management:  Are there any other symptoms that are distressing to the patient or family that needs addressed? Anxiety:  situational                          Dyspnea:  none                          Fatigue:  exercise intolerance                          Bladder function:  incontinent                          Bowel function:  bowel movement accidents/incontinence    Other:  [place] area reddened     Spiritual history/needs:   notified: n/a.  discussed case this after noon with writer    Palliative Performance Scale:  ___70%  Ambulation reduced; Some disease; Can't do normal job or work; intake normal or reduced; can do full self care; LOC full  ___60%  Ambulation reduced; Significant disease; Can't do hobbies/housework; intake normal or reduced; occasional assist; LOC full/confusion  ___50%  Mainly sit/lie;  Extensive disease; Can't do any work; Considerable assist; intake normal or reduced; LOC full/confusion  ___40%  Mainly in bed; Extensive disease; Mainly assist; intake normal or reduced; LOC full/confusion   _x__30%  Bed Bound; Extensive disease; Total care; intake reduced; LOCfull/confusion  ___20%  Bed Bound; Extensive disease; Total care; intake minimal; Drowsy/coma  ___10%  Bed Bound; Extensive disease; Total care; Mouth care only; Drowsy/coma  ___0       Death    Readmission Risk:  17%    Notes:   Camille Young is resting in bed with radiology staff at the bedside completing an ultrasounds. [de-identified] of the conversation is held with his spouse Zoey Bowens via phone. Camille Young was brought to the ED yesterday from the [facility] with increased confusion. He has a history of depression, diabetes, TIA and according to his spouse dementia. Zoey Bowens states that Camille Young was originally sent to [facility] in May. She was unable to care for him at that time due to his limited mobility and increased needs with his ADL's. Zoey Bowens states that for a short time he did therapy but approximately two months into his stay \" I think he did hospice. He wasn't doing therapy anymore after that. \" She is unsure what company if any he had for Hospice and she does not remember signing any paperwork. His son and daughter in law worked on getting him into the [facility] at [place] as their \"rooms are bigger\" so earlier this summer he was transferred to their care. He requires \"a machine\" to get up to the wheelchair and does not ambulate or transfer on his own. The facility provides his medications and ADL assistance. We discuss code status and AD. Zoey Bwoens states that they both completed a living will and [facility] \"right after he was diagnosed with dementia. But he was still in his right mind and he said he did not want to live on a machine. \" States that Camille Young has had mini strokes in the past and recalls an event when he fell and hit his head. \"He wouldn't go to the doctor and slept for four or five days. When he finally went they told him he had a concussion. \" Zoey Bowens states that she has tried to maintain contact with him but it has been difficult due to pandemic restrictions. We discuss his code status. Tyler Hess states that she is confused as to what he would want. \"His mood changes so much. I don't really know what he wants anymore. I know he does not want to live on a machine but how do I know what he would be like after that? \" Support provided. Discussed his current code status and differences between full code, DNR-CCA and DNR-CC. Tyler Hess states that she is comfortable with this current code status as it is in line with his wishes when they completed their advanced directives \"and I know for sure he was in his right mind then. Now he changes his mind from one minute to the next. \"     Discussion held with Delbra Bloch in his room. He declines to answer questions regarding where he is and year. He rolls his eyes and says \"come on. \" Discussed purpose of questions and he declines to answer. When staff asks for his birthday he states \"[birth year].\" He is unsure of the events that led to his admission and does not remember what happened in the emergency department yesterday. When asked states that he would not want intubated but that he would like to have CPR. Primary nurse states that he has been confused today. His spouse states that she will return to the hospital in less than an hour to answer further questions if needed. Discussed with  and primary nurse. Will continue to follow and support.      Palliative Care Plan:  Education/support to family  Education/support to patient  Discharge planning/helping to coordinate care  Providing support for coping/adaptation/distress of family  Continue with current plan of care  Validating patient/family distress  Palliative Care Goals:  provide comfort care/support/palliation/relieve suffering, strengthening relationships and support for family/caregiver  Visit focus:  Routine meeting  Provide clinical updates and answer questions  Listen to patient/family concerns  Assess family understanding, concerns, and coping  Discuss discharge planning  Interdiscplinary collaboration  Address patient/family distress  Build trust  Provide emotional support to the family  Elicit patient's goals and values, and use these to establish or modify goals of care     Maday Paula Nurse Coordinator  9/25/2020 3:58 PM

## 2020-09-25 NOTE — CONSULTS
[facility]    [address], [address]  [facility]  9/25/2020 4:31 PM    Pt Name:    Umm Black  MRN:     [id number]   [id number]  YOB: 1945  Admit Date:    9/24/2020  6:03 PM  Primary Care Physician:  Simona Olson MD      TELEHEALTH EVALUATION -- Audio/Visual (During QTOKX-82 public health emergency)     Telehealth service was provided with the patient at his room in [facility] and myself the physician in my house in [place], [place] and the patient's RN, Wilfredo Fernandez, who has initiated the visit. Pursuant to the emergency declaration under the [address] [place] [address], [facility] waiver authority and the [employer] Act, this Virtual  Visit was conducted, with patient's consent, to reduce the patient's risk of exposure to COVID-19 and provide continuity of care for an established patient. Services were provided through a video synchronous discussion virtually to substitute for in-person clinic visit. Reason for Consult:  LISANDRA on CKD    History:   The patient is a 70 y.o. male with history of CKD III, DM, past CVA, BPH, urinary retention, hx of recurrent UTI, dementia presented with altered mental status and right sided weakness per nursing home staff. Patient also apparently had a UA at NH showing nitrites and leukocyte esterase. Head CT showed no acute process. Patient was almost intubated in the ED due to glascow coma scale of 7 however he became more alert and did not require intubation and was adamant that he did not want to be intubated. His troponin was noted to be elevated. He was started on Rocephin for UTI. Serum creatinine noted to be elevated so nephrology consulted. Creat 2.4 on admission, 2.3 today previous was 1.75 in June and 1.3 in May. Patient poor historian, history obtained from wife. He has never seen a nephrologist before.   He did have a hospitalization in [place] about a year ago where he had urinary retention and needed keith catheter placed for about 10 days. After that he had issues with UTIs. He is incontinent of urine. On lisinopril at home. Past UAs show 4+ protein, no blood. No edema. Serum albumin 2.5. has had recent diarrhea and started on Imodium. Hx of DM since around age 29. Past Medical History:  Past Medical History:   Diagnosis Date    BPH (benign prostatic hyperplasia)     [facility]erebral artery occlusion with cerebral infarction ([facility] [address]     Depression     Diabetes mellitus (HCC)     Dorsalgia     Hypertension     Incontinence of feces     Lumbar radiculopathy     TIA (transient ischemic attack)     Urinary retention     UTI (urinary tract infection)        Past Surgical History:  Past Surgical History:   Procedure Laterality Date    NERVE SURGERY Left 10/15/2019    LUMBAR FACET-L4-L5, L5-SI [facility]       Family History:  History reviewed. No pertinent family history.     Social History:  Social History     Socioeconomic History    Marital status:      Spouse name: Not on file    Number of children: Not on file    Years of education: Not on file    Highest education level: Not on file   Occupational History    Not on file   Social Needs    Financial resource strain: Not on file    Food insecurity     Worry: Not on file     Inability: Not on file    Transportation needs     Medical: Not on file     Non-medical: Not on file   Tobacco Use    Smoking status: Never Smoker    Smokeless tobacco: Never Used   Substance and Sexual Activity    Alcohol use: No    Drug use: No    Sexual activity: Not Currently   Lifestyle    Physical activity     Days per week: Not on file     Minutes per session: Not on file    Stress: Not on file   Relationships    Social connections     Talks on phone: Not on file     Gets together: Not on file     Attends [employer] service: Not on file     Active member of club or organization: Not on file     Attends meetings of clubs or organizations: Not on file     Relationship status: Not on file    Intimate partner violence     Fear of current or ex partner: Not on file     Emotionally abused: Not on file     Physically abused: Not on file     Forced sexual activity: Not on file   Other Topics Concern    Not on file   Social History Narrative    Not on file       Home Meds:  Prior to Admission medications    Medication Sig Start Date End Date Taking? Authorizing Provider   losartan (COZAAR) 100 MG tablet Take 100 mg by mouth daily   Yes Historical Provider, MD   NONFORMULARY Take by mouth daily hyzaar 100-25mg oral   Yes Historical Provider, MD   loperamide (IMODIUM) 2 MG capsule Take 2 mg by mouth 4 times daily as needed for Diarrhea   Yes Historical Provider, MD   insulin aspart (NOVOLOG FLEXPEN) 100 UNIT/ML injection pen Inject into the skin 4 times daily Sliding scale   Yes Historical Provider, MD   amLODIPine (NORVASC) 10 MG tablet Take 10 mg by mouth daily   Yes Historical Provider, MD   ammonium lactate (AMLACTIN) 12 % cream Apply topically nightly Apply topically as needed. Yes Historical Provider, MD   hydrALAZINE (APRESOLINE) 25 MG tablet Take 50 mg by mouth 3 times daily    Yes Historical Provider, MD   glimepiride (AMARYL) 2 MG tablet Take 2 mg by mouth every morning (before breakfast)  4/21/20  Yes Historical Provider, MD   insulin glargine (LANTUS) 100 UNIT/ML injection vial Inject 15 Units into the skin nightly    Yes Historical Provider, MD   magnesium hydroxide (MILK OF MAGNESIA) 400 MG/5ML suspension Take 30 mLs by mouth 9/4/19  Yes Historical Provider, MD   polyethylene glycol (GLYCOLAX) packet Take 17 g by mouth 9/7/19  Yes Historical Provider, MD   sertraline (ZOLOFT) 100 MG tablet Take 200 mg by mouth daily  10/3/19  Yes Historical Provider, MD   gabapentin (NEURONTIN) 300 MG capsule Take 300 mg by mouth 3 times daily.    Yes Historical Provider, MD   tamsulosin (FLOMAX) 0.4 MG capsule Take 1 capsule by mouth daily 9/12/19  Yes Magali Mccall MD   verapamil (CALAN SR) 240 MG extended release tablet Take 240 mg by mouth nightly   Yes Historical Provider, MD   docusate sodium (COLACE) 100 MG capsule Take 100 mg by mouth daily    Yes Historical Provider, MD   metFORMIN (GLUCOPHAGE) 500 MG tablet Take 500 mg by mouth 2 times daily (with meals)   Yes Historical Provider, MD   Sodium Phosphates (FLEET) 7-19 GM/118ML Place 1 enema rectally once as needed    Historical Provider, MD   glucose (GLUTOSE) 40 % GEL Take 15 g by mouth as needed  9/4/19   Historical Provider, MD   glucagon, rDNA, 1 MG injection Inject 1 mg into the muscle 9/4/19   Historical Provider, MD   bisacodyl (DULCOLAX) 10 MG suppository Place 10 mg rectally daily as needed for Constipation    Historical Provider, MD   acetaminophen (TYLENOL) 500 MG tablet Take 1,000 mg by mouth 3 times daily as needed for Pain    Historical Provider, MD       Review of Systems:  Constitutional: no fever or chills  Head: No headaches  Eyes: no blurry vision, no discharge  Ears: no ear pain or hearing changes  Nose: no runny nose or epistaxis  Respiratory: no shortness of breath or cough or sputum production  Cardiovascular: no chest pain, no edema  GI: no nausea, no vomiting +diarrhea  : denies any hematuria, no flank pain +incontinence  Skin: no rash  Musculoskeletal: no joint pain, moves all ext  Neuro: no tremor, no slurred speech  Psychiatric: stable mood, no depression or insomnia    Current Meds:  Infusion:    sodium chloride 75 mL/hr at 09/25/20 0517    sodium chloride 125 mL/hr at 09/24/20 2000     Meds:    gabapentin  300 mg Oral TID    glimepiride  2 mg Oral BID WC    sertraline  200 mg Oral Daily    tamsulosin  0.4 mg Oral Daily    amLODIPine  10 mg Oral Daily    insulin lispro  4-10 Units Subcutaneous TID AC    insulin glargine  15 Units Subcutaneous Nightly    sodium chloride flush  10 mL Intravenous 2 times per day    enoxaparin  40 mg Subcutaneous Daily    clopidogrel  75 mg Oral Daily    hydrALAZINE  50 mg Oral 3 times per day     Meds prn: sodium chloride flush, acetaminophen **OR** acetaminophen, polyethylene glycol, promethazine **OR** ondansetron     Allergies/Intolerances: ALLERGIES: Patient has no known allergies. 24HR INTAKE/OUTPUT:      Intake/Output Summary (Last 24 hours) at 9/25/2020 1631  Last data filed at 9/25/2020 0517  Gross per 24 hour   Intake 915 ml   Output --   Net 915 ml     I/O last 3 completed shifts: In: 497 [I.V.:915]  Out: -   No intake/output data recorded. Admission weight: 225 lb 5 oz (102.2 kg)  Wt Readings from Last 3 Encounters:   09/24/20 225 lb 5 oz (102.2 kg)   07/23/20 217 lb (98.4 kg)   06/29/20 200 lb (90.7 kg)     Body mass index is 29.73 kg/m². Physical Examination:  VITALS:  BP (!) 149/54   Pulse 82   Temp 98.7 °F (37.1 °C) (Temporal)   Resp 18   Ht 6' 1\" (1.854 m)   Wt 225 lb 5 oz (102.2 kg)   SpO2 94%   BMI 29.73 kg/m²   Weight:   Wt Readings from Last 3 Encounters:   09/24/20 225 lb 5 oz (102.2 kg)   07/23/20 217 lb (98.4 kg)   06/29/20 200 lb (90.7 kg)   General -- no distress  Oral Mucosa -- moist  Neck --  JVD - no  Extremities -- no edema, moves all extremeties  CNS - awake and alert   Pschy - not agitated, mood and memory normal    Due to this being a TeleHealth encounter, evaluation of the following organ systems is limited: Vitals/EENT/Resp/CV/GI//MS/Neuro/Skin/Heme-Lymph-Imm. Lab Data  CBC:   Recent Labs     09/24/20  1831 09/25/20  0603   WBC 6.0 4.5   HGB 8.0* 7.3*   HCT 25.1* 23.5*    267     BMP:  Recent Labs     09/24/20  1831 09/25/20  0603    138   K 4.5 3.9    107   CO2 23 23   BUN 45* 42*   CREATININE 2.39* 2.33*   GLUCOSE 123* 118*   CALCIUM 8.2* 8.0*     PTH: @PTH@  TSH: No results for input(s): TSH in the last 72 hours.   HgBa1c: No results for input(s): LABA1C in the last 72 hours. Hepatic:   Recent Labs     09/25/20  0603   LABALBU 2.5*   AST 8   ALT 6   BILITOT 0.20*   ALKPHOS 73     ABGs: No results found for: PHART, PO2ART, [id number]  Troponin: No results for input(s): TROPONINI in the last 72 hours. BNP: No results for input(s): BNP in the last 72 hours. Old labs reviewed. Impression and Plan:  1. LISANDRA on CKD III vs progression of CKD:  Patient has been having some diarrhea could be related to decreased intravascular volume from GI losses compounded by ACE-I. He does have 4+ protein in urine. No hematuria. Will quantify with urine prot/creat ratio. Renal US unremarkable but did not look at bladder, will check bladder scan due to hx of urinary retention. Continue with IV fluids 0.9 @ 75 cc/hour, hold lisinopril  2. Altered mental status appears resolved  3. UTI: apparently UA at NH showed +nitrites and LE, UA here looks negative. On rocephin  4. Anemia:  Wife reports hx of + hemoccult stools but patient refused colonoscopy in past.  Will check iron stores. Check SPEP  5. Right renal cyst  6. Elevated troponin: 2D echo ordered  7. HTN: improving  8. DM  9. Dementia    Thank you for allowing me to participate in the care of this patient. Please feel free to call me if you have any questions.      Electronically signed by [id number] Esposito DO on 9/25/20 at 4:31 PM EDT    [facility]

## 2020-09-25 NOTE — FLOWSHEET NOTE
Vitals checked and reassessed. Drowsy, wakes up when name called. Oriented to name and place only. Incontinent of urine in brief. Brief changed and niurka care given. Repositioned to left side. Bed alarm on, call light within reach.  Continue to monitor

## 2020-09-25 NOTE — PROGRESS NOTES
Unable to see Patient or wife at [employer] visit attempts this a.m. or p.m. Will assess on Monday.     Heather Cuevas, [employer]  9/25/2020

## 2020-09-25 NOTE — ED PROVIDER NOTES
(L) 40.7 - 50.3 %    MCV 91.3 82.6 - 102.9 fL    MCH 29.1 25.2 - 33.5 pg    MCHC 31.9 28.4 - 34.8 g/dL    RDW 14.2 11.8 - 14.4 %    Platelets 471 564 - 262 k/uL    MPV 9.7 8.1 - 13.5 fL    NRBC Automated 0.0 0.0 per 100 WBC    Differential Type NOT REPORTED     Seg Neutrophils 68 (H) 36 - 65 %    Lymphocytes 14 (L) 24 - 43 %    Monocytes 15 (H) 3 - 12 %    Eosinophils % 3 1 - 4 %    Basophils 0 0 - 2 %    Immature Granulocytes 0 0 %    Segs Absolute 4.03 1.50 - 8.10 k/uL    Absolute Lymph # 0.86 (L) 1.10 - 3.70 k/uL    Absolute Mono # 0.91 0.10 - 1.20 k/uL    Absolute Eos # 0.18 0.00 - 0.44 k/uL    Basophils Absolute <0.03 0.00 - 0.20 k/uL    Absolute Immature Granulocyte <0.03 0.00 - 0.30 k/uL    WBC Morphology NOT REPORTED     RBC Morphology NOT REPORTED     Platelet Estimate NOT REPORTED    Basic Metabolic Panel    Collection Time: 09/24/20  6:31 PM   Result Value Ref Range    Glucose 123 (H) 70 - 99 mg/dL    BUN 45 (H) 8 - 23 mg/dL    CREATININE 2.39 (H) 0.70 - 1.20 mg/dL    Bun/Cre Ratio 19 9 - 20    Calcium 8.2 (L) 8.6 - 10.4 mg/dL    Sodium 137 135 - 144 mmol/L    Potassium 4.5 3.7 - 5.3 mmol/L    Chloride 105 98 - 107 mmol/L    CO2 23 20 - 31 mmol/L    Anion Gap 9 9 - 17 mmol/L    GFR Non-African American 27 (L) >60 mL/min    GFR  32 (L) >60 mL/min    GFR Comment          GFR Staging         Troponin    Collection Time: 09/24/20  6:31 PM   Result Value Ref Range    Troponin, High Sensitivity 115 (HH) 0 - 22 ng/L    Troponin T NOT REPORTED <0.03 ng/mL    Troponin Interp NOT REPORTED    Lactate, Sepsis    Collection Time: 09/24/20  6:33 PM   Result Value Ref Range    Lactic Acid, Sepsis 0.5 0.5 - 1.9 mmol/L    Lactic Acid, Sepsis, Whole Blood NOT REPORTED 0.5 - 1.9 mmol/L   EKG 12 Lead    Collection Time: 09/24/20  7:08 PM   Result Value Ref Range    Ventricular Rate 75 BPM    Atrial Rate 75 BPM    P-R Interval 220 ms    QRS Duration 108 ms    Q-T Interval 424 ms    QTc Calculation (Bazett) 473 reviewed the radiologist's interpretation(s) and concur.            ---------------------------------------------COUNSELING--------------------------------------------------    Counseling: The emergency provider has spoken with the spouse and discussed today's findings, in addition to providing specific detail for the plan of care and counseling regarding the diagnosis and prognosis. They are agreeable with the plan.     -----------------------------------------PROCEDURE NOTE---------------------------------------------    None.    -----------------------------------MEDICAL DECISION MAKING--------------------------------------    Vital Signs: Reviewed the patient's vital signs. Pulse oximetry interpretation: Not hypoxic     Nursing Notes: Reviewed and utilized the nursing notes. ED Orders:    Orders Placed This Encounter   Procedures    Culture, Blood 2    Culture, Blood 1    XR CHEST 1 VIEW    CT HEAD WO CONTRAST    CBC Auto Differential    Basic Metabolic Panel    Troponin    Urinalysis with Microscopic    Lactate, Sepsis    Troponin    CBC    Comprehensive Metabolic Panel w/ Reflex to MG    DIET GENERAL; Carb Control: 4 carb choices (60 gms)/meal; No Added Salt (3-4 GM)    Vital signs    Continuous Pulse Oximetry    Telemetry monitoring    Vital signs per unit routine    Notify physician    Up as tolerated    Elevate heels off of bed at all times if patient is not able to move lower extremities    Turn or assist with turn every 2 hours if patient is unable to turn self. Remind patient to turn if necessary.     Inspect skin per unit guidelines    Maintain HOB at the lowest elevation consistent with medical plan of care    DNR comfort care    Inpatient consult to Case Management    Inpatient consult to Palliative Care    Inpatient consult to Social Work    OT eval and treat    PT eval and treat    Initiate Oxygen Therapy Protocol    Initiate Oxygen Therapy Protocol    Pulse Oximetry Spot Check    SLP eval and treat    EKG 12 Lead    EKG 12 Lead    EKG 12 Lead    PATIENT STATUS (FROM ED OR OR/PROCEDURAL) Inpatient    Suicide precautions       Electrocardiogram:  EKG # 1  Ordered, reviewed, and independently interpreted by the EP. Time Interpreted: 1910 hrs. EKG interpreted by me in the absence of the cardiologist contemporaneously with patient care shows normal sinus rhythm with a first-degree AV block with a MO interval of 0.22, normal QTC 0.47, normal axis +39. No STEMI. No bundle branch block pattern. Poor R wave progression with nonspecific ST-T wave changes anteriorly. No acute ischemia. No STEMI. Electrocardiogram:  EKG # 2  Ordered, reviewed, and independently interpreted by the EP. Time Interpreted: 2122 hrs. EKG interpreted by me in the absence of the cardiologist contemporaneously with patient care shows normal sinus rhythm rate of 79 bpm with a first-degree AV block with a MO interval of 0.23, long QTc 0.49, normal axis +60. Nonspecific ST-T wave changes with poor R wave progression anteriorly. No STEMI. No bundle branch block pattern. No acute ischemia. ED Physician Core Measures: N/A    Critical Care:  No      -------------------------CLINICAL IMPRESSION AND DISPOSITION-----------------------------    CLINICAL IMPRESSION:  1. Acute CVA (cerebrovascular accident) ([facility] [address]    2. Elevated troponin    3. Somnolence    4. Sepsis without acute organ dysfunction, due to unspecified organism ([facility] [address]    5. Altered mental status, unspecified altered mental status type    6. Essential hypertension    7. DM type 2, not at goal [facility])    8.  DNR (do not resuscitate) discussion        PRESCRIPTIONS:  Current Discharge Medication List           DISPOSITION:  Admit      PATIENT CONDITION:  stable              Az Duffy MD  09/25/20 0986

## 2020-09-25 NOTE — PROGRESS NOTES
[facility] SPECIALTY [facility]  OCCUPATIONAL THERAPY  No Visit Note    [] ICU    [x] Acute   Patient: Kaylie Bryson  Room: 6169/9614-04      Kaylie Bryson not seen on 9/25/2020 at 8:25 AM due to per nursing, pt non ambulatory and not appropriate for therapy evaluation at this time. Pt is to be returning to the [facility].           Signature: ZEYAD Blake, OTR/L

## 2020-09-25 NOTE — ED NOTES
MD Thiago Almanza in room with this Tee Harvey and Maricruz MORA. Pt verbalizes does not wish to have intubation/breathing assistance from ventilator. Pt appears awake, alert to person, place. Pt able to answer questions and follow simple commands.       Adelaida Clinton RN  09/24/20 [id number]

## 2020-09-25 NOTE — PROGRESS NOTES
[facility]  Inpatient/Observation/Outpatient Rehabilitation    Date: 2020  Patient Name: Samson Ramsey       [x] Inpatient Acute/Observation       []  Outpatient  : 1945     per nursing [facility]. Per nursing patient is non-ambulatory and did not want therapy to get patient up at this time     Will attempt evaluation at our earliest opportunity.        Gina Ghosh PT, DPT  Date: 2020

## 2020-09-25 NOTE — PROGRESS NOTES
Speech Language Pathology  Facility/Department: [facility] AT THE [facility] MED SURG   CLINICAL BEDSIDE SWALLOW EVALUATION    NAME: Gladis Lopez  : 1945  MRN: [id number]    ADMISSION DATE: 2020  ADMITTING DIAGNOSIS: has Intractable back pain; Hypertension; Diabetes mellitus ([facility] [address] Lumbar radiculopathy; H/O: CVA (cerebrovascular accident); Urinary retention; Fecal incontinence; Spondylosis of lumbosacral region without myelopathy or radiculopathy; BPH with obstruction/lower urinary tract symptoms; Neurogenic bladder; Other constipation; Sepsis without acute organ dysfunction ([facility] [address] Somnolence; Elevated troponin; and Acute CVA (cerebrovascular accident) [facility]) on their problem list.  ONSET DATE: 20    Recent Chest Xray/CT of Chest: (20 )    Date of Eval: 2020  Evaluating Therapist: Anabel Salazar    Current Diet level:  Current Diet : Regular  Current Liquid Diet : Thin    Pain:  Pain Assessment  Pain Assessment: 0-10  Pain Level: 0      Reason for Referral  Gladis Lopez was referred for a bedside swallow evaluation to assess the efficiency of his swallow function, identify signs and symptoms of aspiration and make recommendations regarding safe dietary consistencies, effective compensatory strategies, and safe eating environment. Impression  Dysphagia Diagnosis: Suspected needs further assessment  Dysphagia Outcome Severity Scale: Level 6: Within functional limits/Modified independence     Treatment Plan  Requires SLP Intervention: Yes  Duration/Frequency of Treatment: x 1 follow up       Recommended Diet and Intervention  Diet Solids Recommendation: Regular  Liquid Consistency Recommendation: Thin  Recommended Form of Meds: Whole with water     Therapeutic Interventions: Diet tolerance monitoring    Compensatory Swallowing Strategies  Compensatory Swallowing Strategies: Eat/Feed slowly; Small bites/sips;Upright as possible for all oral intake; Alternate solids and liquids; External pacing    Treatment/Goals      Short Term Goals 3 days:  Patient will complete repeat BSE as able  Patient will utilize recommended swallow strategies during a snack or meal in 80% of opportunities trialed. Long Term Goals: 5 days  Patient will utilize recommended swallow strategies during a snack or meal in 80% of opportunities trialed. General  Chart Reviewed: Yes  Behavior/Cognition: Alert;Agitated; Requires cueing  Respiratory Status: Room air  O2 Device: None (Room air)  Communication Observation: Functional  Follows Directions: Simple  Dentition: Adequate  Patient Positioning: Upright in bed  Baseline Vocal Quality: Normal  Prior Dysphagia History: Patient reports he \"sometimes\" coughs when he eats and drinks  Consistencies Administered: Reg solid; Dysphagia Soft and Bite-Sized (Dysphagia III); Dysphagia Pureed (Dysphagia I); Thin;Thin - straw      Pain Level: 0    Vision/Hearing  Vision  Vision: Impaired  Vision Exceptions: Wears glasses at all times  Hearing  Hearing: Within functional limits    Oral Motor Deficits  Oral/Motor  Oral Motor: Within functional limits    Oral Phase Dysfunction  Oral Phase  Oral Phase: Exceptions  Oral Phase  Oral Phase - Comment: Patient presents with overall WFL oral phase of swallow characterized by adequate strength/ROM of labial and lingual muculature, adequate bolus preparation/propulsion, and no oral residues present post-swallow. Patient utilized very large bite sizes throughout evaluation. Patient states \"I always take big bites. \"     Indicators of Pharyngeal Phase Dysfunction   Pharyngeal Phase  Pharyngeal Phase: Exceptions  Indicators of Pharyngeal Phase Dysfunction  Decreased Laryngeal Elevation: All  Throat Clearing - Delayed: Soft Solid  Pharyngeal Phase   Pharyngeal: Pharyngeal phase of swallow needs further assessment. Patient demonstrated somewhat reduced laryngeal elevation upon palpation; however, prompt swallow initiation noted.  Patient drank approximately 4 oz. of orange juice successively without overt s/sx of aspiration/penetration. Patient with delayed throat clear x 1 instance; however, no wet vocal quality noted throughout evaluation. Prognosis       Education  Patient Education: ST educated patient re: results of evaluation, diet recommendations, and plan of are  Patient Education Response: Verbalizes understanding             Therapy Time  SLP Individual Minutes  Time In: 0805  Time Out: 0845  Minutes: 40      Patient seen in room sitting upright in bed for evaluation this date. Patient oriented to name only. Patient somewhat agitated with SLP; however, given increased time, patient followed all directions and participated in assessment. Patient is verbal and typically answered questions appropriately. Patient demonstrated some impaired thought organization; however, this improved as the session progressed. Patient admitted due to altered mental status and right sided weakness as well as urinary tract infection. CVA was ruled out. Patient presents with overall WFL oral and pharyngeal phase of swallow. Oral phase was characterized by  of swallow characterized by adequate strength/ROM of labial and lingual muculature, adequate bolus preparation/propulsion, and no oral residues present post-swallow. No right sided weakness noted. Patient utilized very large bite sizes throughout evaluation. Patient states \"I always take big bites. \"Pharyngeal phase of swallow needs further assessment. Patient demonstrated somewhat reduced laryngeal elevation upon palpation; however, prompt swallow initiation noted. Patient drank approximately 4 oz. of orange juice successively without overt s/sx of aspiration/penetration. Patient with delayed throat clear x 1 instance; however, no wet vocal quality noted throughout evaluation. ST will follow patient x 1 for a follow up dysphagia assessment to ensure safest, least restrictive diet level.  ST recommends diet of regular solids and thin liquids. Compensatory strategies should include: small bites/sips, pacing/slow rate, upright during all oral intake. ST to monitor patient's speech/lagnguage and cognitive skills.        Home Franks M.S., CCC-SLP    9/25/2020 9:47 AM

## 2020-09-25 NOTE — ED NOTES
Dr. Ema Baeza notified of critical troponin of 124. Repeat EKG just completed and given to Dr. Ema Baeza, as well. No complaints voiced by patient during EKG.      Aleks Walton RN  09/24/20 2124

## 2020-09-25 NOTE — FLOWSHEET NOTE
Resting in bed with eyes closed. Incontinent of urine and stool. Brief changed and niurka care given. Repositioned to right side.  Resting with eyes closed

## 2020-09-26 NOTE — PLAN OF CARE
Problem: Pain:  Goal: Pain level will decrease  Description: Pain level will decrease  9/26/2020 1859 by Kelsi Dunham RN  Outcome: Ongoing     Problem: Pain:  Goal: Control of acute pain  Description: Control of acute pain  Outcome: Ongoing     Problem: Pain:  Goal: Control of chronic pain  Description: Control of chronic pain  Outcome: Ongoing     Problem: Falls - Risk of:  Goal: Will remain free from falls  Description: Will remain free from falls  9/26/2020 1859 by Kelsi Dunham RN  Outcome: Ongoing     Problem: Falls - Risk of:  Goal: Absence of physical injury  Description: Absence of physical injury  Outcome: Ongoing     Problem: Skin Integrity:  Goal: Will show no infection signs and symptoms  Description: Will show no infection signs and symptoms  9/26/2020 1859 by Kelsi Dunham RN  Outcome: Ongoing     Problem: Skin Integrity:  Goal: Absence of new skin breakdown  Description: Absence of new skin breakdown  Outcome: Ongoing     Problem: Suicide risk  Goal: Provide patient with safe environment  Description: Provide patient with safe environment  9/26/2020 1859 by Kelsi Dunham RN  Outcome: Ongoing     Problem: Confusion - Acute:  Goal: Absence of continued neurological deterioration signs and symptoms  Description: Absence of continued neurological deterioration signs and symptoms  9/26/2020 1859 by Kelsi Dunham RN  Outcome: Ongoing     Problem: Confusion - Acute:  Goal: Mental status will be restored to baseline  Description: Mental status will be restored to baseline  Outcome: Ongoing

## 2020-09-26 NOTE — PROGRESS NOTES
Pt able to use urinal to provide a urine sample. Voided 500mL of clear, yellow urine. Incontinent of stool. Brief changed and pt repositioned to left side. Bed alarm on.

## 2020-09-26 NOTE — PROGRESS NOTES
Vital signs and assessment completed at this time. Pt very drowsy but alert to voice. Oriented to name only. Follows commands. Generalized weakness. FLACC score 0. Brief checked. Pt denies any needs at this time. Bed alarm on. Will continue to monitor.

## 2020-09-26 NOTE — PROGRESS NOTES
[facility]   [phone]   Nephrology progress note  9/26/2020, 12:46 PM      Pt Name:    Chastity Saucedo  MRN:     [id number]     [place]:    1945  Admit Date:    9/24/2020  6:03 PM  Primary Care Physician:  Surinder Thompson MD   Room number  2049/5080-67    TELEHEALTH EVALUATION -- Audio/Visual (During [place]-32 public health emergency)    Patient's Location: Hospital Room # [phone] (myself) Location: [place] [address] office, [address] [place], [place] [address]  Patient's nurse: ABBY Huizar    Chief Complaint: Nephrology following for LISANDRA/CKD    Subjective:  Patient seen and examined  No distress  No chest pain   No shortness of breath  Denies any leg swelling  Incontinent at times  Discussed with RN    Objective:  24HR INTAKE/OUTPUT:      Intake/Output Summary (Last 24 hours) at 9/26/2020 1246  Last data filed at 9/26/2020 0830  Gross per 24 hour   Intake 2215.16 ml   Output 500 ml   Net 1715.16 ml     I/O last 3 completed shifts: In: 1815.2 [P.O.:540; I.V.:1275.2]  Out: 500 [Urine:500]  I/O this shift: In: 400 [P.O.:400]  Out: -   Admission weight: 225 lb 5 oz (102.2 kg)  Wt Readings from Last 3 Encounters:   09/26/20 227 lb 1.2 oz (103 kg)   07/23/20 217 lb (98.4 kg)   06/29/20 200 lb (90.7 kg)     Body mass index is 29.96 kg/m².     Physical examination  VITALS:     Vitals:    09/26/20 0150 09/26/20 0500 09/26/20 0516 09/26/20 0700   BP: 120/74  (!) 159/59 137/62   Pulse: 74   72   Resp: 18   20   Temp: 99 °F (37.2 °C)   98.2 °F (36.8 °C)   TempSrc: Temporal   Temporal   SpO2: 95%   93%   Weight:  227 lb 1.2 oz (103 kg)     Height:         Constitutional and General Appearance: alert and cooperative, appears comfortable, no distress  Lungs: no use of accessory muscles or labored breathing noted, Respiratory effort observed to be normal  Extremities: No visible swelling  Skin:  No discoloration noted on facial skin  Neuro: no slurred speech, no facial drooping noted  Psychiatric:  Not agitated  Mental status: awake and not in distress    Due to this being a TeleHealth encounter, evaluation of the following organ systems is limited: EENT/Resp/CV/GI//MS/Neuro/Skin/Lymph    Lab Data  CBC:   Recent Labs     09/24/20  1831 09/25/20  0603 09/25/20  1402   WBC 6.0 4.5 6.3   HGB 8.0* 7.3* 7.6*   HCT 25.1* 23.5* 24.6*    267 279     BMP:  Recent Labs     09/24/20  1831 09/25/20  0603 09/26/20  0552    138 141   K 4.5 3.9 4.0    107 109*   CO2 23 23 22   BUN 45* 42* 39*   CREATININE 2.39* 2.33* 2.43*   GLUCOSE 123* 118* 150*   CALCIUM 8.2* 8.0* 8.0*     Hepatic:   Recent Labs     09/25/20  0603   LABALBU 2.5*   AST 8   ALT 6   BILITOT 0.20*   ALKPHOS 73         Meds:  Infusion:    sodium chloride 75 mL/hr at 09/26/20 0640    dextrose       Meds:    carvedilol  3.125 mg Oral BID WC    gabapentin  300 mg Oral TID    glimepiride  2 mg Oral BID WC    sertraline  200 mg Oral Daily    tamsulosin  0.4 mg Oral Daily    amLODIPine  10 mg Oral Daily    insulin lispro  4-10 Units Subcutaneous TID AC    insulin glargine  15 Units Subcutaneous Nightly    sodium chloride flush  10 mL Intravenous 2 times per day    enoxaparin  40 mg Subcutaneous Daily    clopidogrel  75 mg Oral Daily    hydrALAZINE  50 mg Oral 3 times per day     Meds prn: sodium chloride flush, acetaminophen **OR** acetaminophen, polyethylene glycol, promethazine **OR** ondansetron, glucose, dextrose, glucagon (rDNA), dextrose       Impression and Plan:  1. LISANDRA on CKD III  Creatinine high but overall stable over last three days  Urine output noted  Check bladder scan today  Continue with IVF for now    2. HTN: BP reading reviewed, coreg added per cardiology earlier today  3. AMS: improved  4. Nephrotic Proteinuria: initial work-up sent, DM vs other causes  Follow SPEP and KIM  Will need comprehensive evaluation of this- can be completed as outpatient    5. DM  6. Electrolytes : stable  7.  Right renal cyst    D/W patient and ABBY Reddy Kaleb Ulloa MD  [facility]    Pursuant to the emergency declaration under the [facility] waiver authority and the [employer] Act, this Virtual  Visit was conducted, with patient's consent, to reduce the patient's risk of exposure to COVID-19. Caregiver (Patient's RN) was present when appropriate. Virtual visit was done to address concerns as mentioned above. Due to this being a TeleHealth encounter (During [facility] public health emergency), evaluation of the following organ systems was limited: EENT/Resp/CV/GI//MS/Neuro/Skin/Lymph     Services were provided through a video synchronous discussion virtually to substitute for in-person visit.

## 2020-09-26 NOTE — PLAN OF CARE
Problem: Pain:  Goal: Pain level will decrease  Description: Pain level will decrease  9/26/2020 1013 by Galindo Maria RN  Outcome: Ongoing  Note: Patient is able to verbalize pain. Patient stated no pain on 0-10 pain scale. Will continue to monitor. 9/25/2020 2146 by Arlette Castrejon RN  Outcome: Ongoing     Problem: Falls - Risk of:  Goal: Will remain free from falls  Description: Will remain free from falls  9/26/2020 1013 by Galindo Maria RN  Outcome: Ongoing  Note: Bed in lowest position. Wheels locked. Call light in reach. 2/4 siderails up. Bed alarm on.    9/25/2020 2146 by Arlette Castrejon RN  Outcome: Ongoing     Problem: Skin Integrity:  Goal: Will show no infection signs and symptoms  Description: Will show no infection signs and symptoms  9/26/2020 1013 by Galindo Maria RN  Outcome: Ongoing  Note: Patient has redness to scrotum, bilateral foot fungus/discoloration, and scattered bruising. NO open areas noted. Will continue to assess. 9/25/2020 2146 by Arlette Castrejon RN  Outcome: Ongoing     Problem: Suicide risk  Goal: Provide patient with safe environment  Description: Provide patient with safe environment  9/26/2020 1013 by Galindo Maria RN  Outcome: Ongoing  Note: Patient denies a plan or intent of suicide at this time. Will reassess. 9/25/2020 2146 by Arlette Castrejon RN  Outcome: Ongoing     Problem: Confusion - Acute:  Goal: Absence of continued neurological deterioration signs and symptoms  Description: Absence of continued neurological deterioration signs and symptoms  9/26/2020 1013 by Galindo Maria RN  Outcome: Ongoing  Note: Patient is alert but disoriented x4. Patient will intermittently recall that he is in the hospital when asked.  Currently disoriented x4.   9/25/2020 2146 by Arlette Castrejon RN  Outcome: Ongoing     Problem: Injury - Risk of, Physical Injury:  Goal: Will remain free from falls  Description: Will remain free from falls  9/26/2020 1013 by

## 2020-09-26 NOTE — PROGRESS NOTES
Patient: Rasheed Lee  : 1945  Date of Admission: 2020  Primary Care Physician: Leana Mcardle  Today's Date: 2020    REASON FOR CONSULTATION: Altered Mental Status (increased confusion onset today. ); Extremity Weakness (new onset of right sided weakness today per NH staff); and Urinary Tract Infection (+nitrites and leucocytes in urine dip today. )      HPI: Mr. Lorraine Tafoya is a 70 y.o. male with no prior cardiac history who was sent from the nursing home because of altered mental status, weakness and urinary tract infection. Cardiology consulted because of elevated troponin. Patient is drowsy and sleepy but responds to questions appropriately. He denies any prior history of heart disease. He knew that he has chronic kidney disease for the past several month. He has history of diabetes and hypertension. He is not aware of any family history of premature heart disease. He is lifelong non-smoker. He is staying at [place] [facility]. He does snore but has never used a CPAP machine. Exercise Tolerance: Mr. Lorraine Tafoya reports that he has a fair exercise tolerance. His says that he could walk around at [place] [facility]. Goes to the bathroom and to the dining area. He is doing better today. Seems to be in a good spirit as well. Denies any chest pain, pressure or tightness. No significant shortness of breath. He just finished his lunch. Denies nausea, vomiting or abdominal pain. No fever or cough. Labs reviewed, not much of a change in his kidney function or hemoglobin level. High-sensitivity troponin not drastically elevated compared to yesterday. I still think this is secondary to chronic kidney disease. I reviewed his echo images. Normal ejection fraction and wall motion. Reviewed his blood pressure log overnight, would like to start him on a very low-dose carvedilol we can increase the carvedilol dose if needed.   He is currently on amlodipine and hydralazine as well.  He is closely followed by our nephrology specialists. Multiple myeloma work-up in progress.    ]  Past Medical History:   Diagnosis Date    BPH (benign prostatic hyperplasia)     Cerebral artery occlusion with cerebral infarction ([address]     Depression     Diabetes mellitus (HCC)     Dorsalgia     Hypertension     Incontinence of feces     Lumbar radiculopathy     TIA (transient ischemic attack)     Urinary retention     UTI (urinary tract infection)        CURRENT ALLERGIES: Patient has no known allergies. REVIEW OF SYSTEMS: Limited review of system because patient is sleepy and drowsy but he was able to answer simple yes/no questions. Positive and negative are mentioned in HPI. Past Surgical History:   Procedure Laterality Date    NERVE SURGERY Left 10/15/2019    LUMBAR FACET-L4-L5, L5-SI performed by Seth Nino MD at [address] History:  Social History     Tobacco Use    Smoking status: Never Smoker    Smokeless tobacco: Never Used   Substance Use Topics    Alcohol use: No    Drug use: No        CURRENT MEDICATIONS:  Outpatient Medications Marked as Taking for the 9/24/20 encounter [facility] Encounter)   Medication Sig Dispense Refill    losartan (COZAAR) 100 MG tablet Take 100 mg by mouth daily      NONFORMULARY Take by mouth daily hyzaar 100-25mg oral      loperamide (IMODIUM) 2 MG capsule Take 2 mg by mouth 4 times daily as needed for Diarrhea      insulin aspart (NOVOLOG FLEXPEN) 100 UNIT/ML injection pen Inject into the skin 4 times daily Sliding scale      amLODIPine (NORVASC) 10 MG tablet Take 10 mg by mouth daily      ammonium lactate (AMLACTIN) 12 % cream Apply topically nightly Apply topically as needed.       hydrALAZINE (APRESOLINE) 25 MG tablet Take 50 mg by mouth 3 times daily       glimepiride (AMARYL) 2 MG tablet Take 2 mg by mouth every morning (before breakfast)       insulin glargine (LANTUS) 100 UNIT/ML injection vial Inject 15 Units into the skin nightly       magnesium hydroxide (MILK OF MAGNESIA) 400 MG/5ML suspension Take 30 mLs by mouth      polyethylene glycol (GLYCOLAX) packet Take 17 g by mouth      sertraline (ZOLOFT) 100 MG tablet Take 200 mg by mouth daily       gabapentin (NEURONTIN) 300 MG capsule Take 300 mg by mouth 3 times daily.  tamsulosin (FLOMAX) 0.4 MG capsule Take 1 capsule by mouth daily 30 capsule 3    verapamil (CALAN SR) 240 MG extended release tablet Take 240 mg by mouth nightly      docusate sodium (COLACE) 100 MG capsule Take 100 mg by mouth daily       metFORMIN (GLUCOPHAGE) 500 MG tablet Take 500 mg by mouth 2 times daily (with meals)         carvedilol (COREG) tablet 3.125 mg, BID WC  gabapentin (NEURONTIN) capsule 300 mg, TID  glimepiride (AMARYL) tablet 2 mg, BID WC  sertraline (ZOLOFT) tablet 200 mg, Daily  tamsulosin (FLOMAX) capsule 0.4 mg, Daily  amLODIPine (NORVASC) tablet 10 mg, Daily  insulin lispro (HUMALOG) injection vial 4-10 Units, TID AC  insulin glargine (LANTUS) injection vial 15 Units, Nightly  0.9 % sodium chloride infusion, Continuous  sodium chloride flush 0.9 % injection 10 mL, 2 times per day  sodium chloride flush 0.9 % injection 10 mL, PRN  acetaminophen (TYLENOL) tablet 650 mg, Q6H PRN    Or  acetaminophen (TYLENOL) suppository 650 mg, Q6H PRN  polyethylene glycol (GLYCOLAX) packet 17 g, Daily PRN  promethazine (PHENERGAN) tablet 12.5 mg, Q6H PRN    Or  ondansetron (ZOFRAN) injection 4 mg, Q6H PRN  enoxaparin (LOVENOX) injection 40 mg, Daily  clopidogrel (PLAVIX) tablet 75 mg, Daily  hydrALAZINE (APRESOLINE) tablet 50 mg, 3 times per day  glucose (GLUTOSE) 40 % oral gel 15 g, PRN  dextrose 50 % IV solution, PRN  glucagon (rDNA) injection 1 mg, PRN  dextrose 5 % solution, PRN         FAMILY HISTORY: No family history of premature CAD.     PHYSICAL EXAM:   /62   Pulse 72   Temp 98.2 °F (36.8 °C) (Temporal)   Resp 20   Ht 6' 1\" (1.854 m)   Wt 227 lb 1.2 oz (103 kg)   SpO2 93%   BMI 29.96 kg/m²  Body mass index is 29.96 kg/m². [ INSTRUCTIONS:  \"[x]\" Indicates a positive item  \"[]\" Indicates a negative item   Vital Signs: (As obtained by patient/caregiver or practitioner observation)    Blood pressure-  Heart rate-    Respiratory rate-    Temperature-  Pulse oximetry-     Constitutional: [x] Appears well-developed and well-nourished [x] No apparent distress      [] Abnormal-   Mental status  [x] Alert and awake  [x] Oriented to person/place/time [x]Able to follow commands      Eyes:  EOM    [x]  Normal  [] Abnormal-  Sclera  [x]  Normal  [] Abnormal -         Discharge [x]  None visible  [] Abnormal -    HENT:   [x] Normocephalic, atraumatic.   [] Abnormal   [] Mouth/Throat: Mucous membranes are moist.     External Ears [x] Normal  [] Abnormal-     Neck: [x] No visualized mass     Pulmonary/Chest: [x] Respiratory effort normal.  [x] No visualized signs of difficulty breathing or respiratory distress        [] Abnormal-     Neurological:        [x] No Facial Asymmetry (Cranial nerve 7 motor function) (limited exam to video visit)          [] No gaze palsy        [] Abnormal-         Skin:        [x] No significant exanthematous lesions or discoloration noted on facial skin         [] Abnormal-            Psychiatric:       [x] Normal Affect [] No Hallucinations        [] Abnormal-     Other pertinent observable physical exam findings: None      MOST RECENT LABS ON RECORD:   Lab Results   Component Value Date    WBC 6.3 09/25/2020    HGB 7.6 (L) 09/25/2020    HCT 24.6 (L) 09/25/2020     09/25/2020    CHOL 121 09/25/2020    TRIG 90 09/25/2020    HDL 36 (L) 09/25/2020    LDLCHOLESTEROL 67 09/25/2020    ALT 6 09/25/2020    AST 8 09/25/2020     09/26/2020    K 4.0 09/26/2020     (H) 09/26/2020    CREATININE 2.43 (H) 09/26/2020    BUN 39 (H) 09/26/2020    CO2 22 09/26/2020    TSH 1.45 10/25/2019    PSA 4.21 (H) 03/30/2020    INR 1.0 09/07/2019    LABA1C 7.4 (H) 12/23/2019 ASSESSMENT:  Patient Active Problem List    Diagnosis Date Noted    Altered mental status 09/25/2020     Priority: Low    LISANDRA (acute kidney injury) ([facility] 09/25/2020     Priority: Low    Sepsis without acute organ dysfunction ([facility] 09/24/2020     Priority: Low    Somnolence 09/24/2020     Priority: Low    Elevated troponin 09/24/2020     Priority: Low    TIA (transient ischemic attack) 09/24/2020     Priority: Low    BPH with obstruction/lower urinary tract symptoms 10/16/2019     Priority: Low    Neurogenic bladder 10/16/2019     Priority: Low    Other constipation 10/16/2019     Priority: Low    Spondylosis of lumbosacral region without myelopathy or radiculopathy 10/15/2019     Priority: Low    Urinary retention 09/07/2019     Priority: Low    Fecal incontinence 09/07/2019     Priority: Low    H/O: CVA (cerebrovascular accident) 09/06/2019     Priority: Low    Lumbar radiculopathy 09/05/2019     Priority: Low    Hypertension      Priority: Low    Diabetes mellitus ([facility]      Priority: Low    Intractable back pain 09/04/2019     Priority: Low       PLAN:  · Elevated troponin:   · I think elevated troponin secondary to acute on chronic renal failure. · Patient denies any chest pain, pressure or tightness. · No significant shortness of breath. · ECGs reviewed, no acute ischemic changes. · Echo reviewed, normal ejection fraction and wall motion. Continue medical management and risk factor modification. Antiplatelet Agent: Continue Plavix for now. Beta Blocker: Start carvedilol 3.125 mg twice daily for better control of the blood pressure. We can increase the dose if needed. Anti-anginal medications: Continue amlodipine (Norvasc) 10 mg once daily. Excellent lipid panel. Pending hemoglobin A1c result. · Acute on chronic renal failure:  · Closely followed by our nephrology consultant. · Multiple myeloma work-up in progress. .    Anemia: Please consider transfusion if hemoglobin less than 7. I will send anemia work-up.   Essential Hypertension: Borderline controlled  · Beta Blocker: Start carvedilol 3.125 mg twice daily, we may need to increase the dose depending on his next 24-hour blood pressure log. · Calcium Channel Blocker: Continue amlodipine (Norvasc) 10 mg once daily. · Continue hydralazine 50 mg 3 times daily    Mina South is a 70 y.o. male being evaluated by a Virtual Visit (video visit) encounter to address concerns as mentioned above. A caregiver was present when appropriate. Due to this being a TeleHealth encounter (During [facility] public health emergency), evaluation of the following organ systems was limited: Vitals/Constitutional/EENT/Resp/CV/GI//MS/Neuro/Skin/Heme-Lymph-Imm. Pursuant to the emergency declaration under the [address] [place] [address], [address] [place] [address] authority and the [employer] and [employer] Act, this Virtual Visit was conducted with patient's (and/or legal guardian's) consent, to reduce the patient's risk of exposure to COVID-19 and provide necessary medical care. The patient (and/or legal guardian) has also been advised to contact this office for worsening conditions or problems, and seek emergency medical treatment and/or call 911 if deemed necessary. Services were provided through a video synchronous discussion virtually to substitute for in-person clinic visit. Patient and provider were located at their individual homes. --aDsia Liz MD on 9/26/2020 at 12:25 PM    An electronic signature was used to authenticate this note. Once again, thank you for allowing me to participate in this patients care. Please do not hesitate to contact me could I be of further assistance. Sincerely,  Brett Romero MD, F.A.C.C.   [facility] Cardiology Specialist    [address]  [facility] [place], [address] [place] [address]  Phone: [phone], Fax: [phone]     I believe that the risk of significant morbidity and mortality related to the patient's current medical conditions are: Intermediate.

## 2020-09-26 NOTE — PROGRESS NOTES
Speech Language Pathology  Facility/Department: [facility] AT THE [facility] MED SURG   CLINICAL BEDSIDE SWALLOW TREATMENT    NAME: Angie Camara  : 1945  MRN: [id number]    ADMISSION DATE: 2020    Visit Diagnoses       Codes    DM type 2, not at goal [facility])     E11.9    DNR (do not resuscitate) discussion     Z71.89               Past Medical History:  has a past medical history of BPH (benign prostatic hyperplasia), Cerebral artery occlusion with cerebral infarction ([facility] Depression, Diabetes mellitus ([facility] Dorsalgia, Hypertension, Incontinence of feces, Lumbar radiculopathy, TIA (transient ischemic attack), Urinary retention, and UTI (urinary tract infection). Past Surgical History:  has a past surgical history that includes Nerve Surgery (Left, 10/15/2019). Recent Chest Xray/CT of Chest: ( 20)  Impression    No acute process. Date of Eval: 2020  Evaluating Therapist: Megan Lincoln    Current Diet level:  Current Diet : Regular  Current Liquid Diet : Thin    Primary Complaint:    Patient reports no difficulty with swallowing at this time. Pain:   Pain Assessment  Pain Assessment: 0-10  Pain Level: 0    Reason for Referral  Angie Camara was referred for a bedside swallow evaluation to assess the efficiency of his swallow function, identify signs and symptoms of aspiration, and make recommendations regarding safe dietary consistencies, effective compensatory strategies, and safe eating environment. Impression  Dysphagia Diagnosis  Dysphagia Diagnosis: Swallow function appears grossly intact  Dysphagia Outcome Severity Scale: Level 6: Within functional limits/Modified independence    Treatment Plan  Requires SLP Intervention: No         Recommended Diet and Intervention  Solid: Diet Solids Recommendation: Regular  Liquid: Liquid Consistency Recommendation:  Thin  Medication:Recommended Form of Meds: Whole with water  Therapeutic Interventions: Diet tolerance monitoring    Compensatory Swallowing Strategies Attempted  Compensatory Swallowing Strategies: Eat/Feed slowly; Small bites/sips;Upright as possible for all oral intake; Alternate solids and liquids; External pacing        General  Chart Reviewed: Yes  Behavior/Cognition  Behavior/Cognition: Alert; Cooperative  Respiratory Status: Room air  O2 Device: None (Room air)  Communication Observation: Functional  Follows Directions: Simple  Dentition: Adequate  Patient Positioning: Upright in bed  Baseline Vocal Quality: Normal  Consistencies Administered: Reg solid; Dysphagia Soft and Bite-Sized (Dysphagia III); Thin;Thin - straw    Pain Level: 0    Vision/Hearing  Vision  Vision: Impaired  Vision Exceptions: Wears glasses at all times  Hearing  Hearing: Within functional limits    Oral Motor Deficits  Oral/Motor  Oral Motor: Within functional limits    Oral Phase Dysfunction  Oral Phase  Oral Phase: WFL     Indicators of Pharyngeal Phase Dysfunction   Pharyngeal Phase  Pharyngeal Phase: WFL    Prognosis  Prognosis  Prognosis for safe diet advancement: good  Barriers to reach goals: cognitive deficits  Individuals consulted  Consulted and agree with results and recommendations: Patient;RN    Education  Patient Education: ST educated patient re: results of evaluation, diet recommendations, and plan of are  Patient Education Response: Verbalizes understanding       Therapy Time  SLP Individual Minutes  Time In: 0800  Time Out: 0815  Minutes: 15        Patient seen sitting upright in bed for dysphagia follow up. Patietn reports no bolus sensation or coughing/throat clearing with foods in last day. Patient consumed breakfast meal consisting of toast, scrambled eggs, orange juice, and coffee. Patient demonstrated adequate bolus preparation/propulsion with no oral residues post-swallow. Patient also demonstrating no overt s/sx of aspiration/penetration with any consistencies trialed.  Adequate laryngeal elevation upon palpation noted. Patient took appropriate bite sizes throughout meal, but took large successive sips of orange juice x 1. Despite this, no overt s/sx of asp/pen noted. ST recommends continued diet of regular solids and thin liquids at this time. Recommended strategies should include: small bites/sips, pacing/slow rate, alternate bites/sips, upright during all oral intake. Further dysphagia therapy not warranted at this time. Re-consult ST if changes occur.      Chastity Llamas M.S., CCC-SLP  9/26/2020 8:18 AM

## 2020-09-26 NOTE — PROGRESS NOTES
[facility]  Inpatient/Observation/Outpatient Rehabilitation    Date: 2020  Patient Name: Brice Sargent       [x] Inpatient Acute/Observation       []  Outpatient  : 1945       [] Pt no showed for scheduled appointment    [] Pt refused/declined therapy at this time due to:           [x] Pt cancelled due to:  [] No Reason Given   [x] Sick/ill   [] Other: Per RN, continue to hold PT/OT at this time d/t high troponin levels. Will attempt evaluation at our earliest opportunity.        Krista Matt, PT, DPT Date: 2020

## 2020-09-26 NOTE — PROGRESS NOTES
Writer told Dr. Stas Avalos about critical troponin value at 136. No new orders received. Patient is still to hold on PT/OT due to high troponin values.

## 2020-09-26 NOTE — PLAN OF CARE
Problem: Pain:  Goal: Pain level will decrease  Description: Pain level will decrease  Outcome: Ongoing     Problem: Pain:  Goal: Control of chronic pain  Description: Control of chronic pain  Outcome: Ongoing     Problem: Falls - Risk of:  Goal: Will remain free from falls  Description: Will remain free from falls  Outcome: Ongoing     Problem: Skin Integrity:  Goal: Will show no infection signs and symptoms  Description: Will show no infection signs and symptoms  Outcome: Ongoing     Problem: Suicide risk  Goal: Provide patient with safe environment  Description: Provide patient with safe environment  Outcome: Ongoing     Problem: Skin Integrity:  Goal: Absence of new skin breakdown  Description: Absence of new skin breakdown  Outcome: Ongoing     Problem: Confusion - Acute:  Goal: Mental status will be restored to baseline  Description: Mental status will be restored to baseline  Outcome: Ongoing     Problem: Discharge Planning:  Goal: Participates in care planning  Description: Participates in care planning  Outcome: Ongoing     Problem: Sensory Perception - Impaired:  Goal: Demonstrations of improved sensory functioning will increase  Description: Demonstrations of improved sensory functioning will increase  Outcome: Ongoing     Problem: Psychomotor Activity - Altered:  Goal: Absence of psychomotor disturbance signs and symptoms  Description: Absence of psychomotor disturbance signs and symptoms  Outcome: Ongoing     Problem: Mood - Altered:  Goal: Verbalizations of feeling emotionally comfortable while being cared for will increase  Description: Verbalizations of feeling emotionally comfortable while being cared for will increase  Outcome: Ongoing     Problem: Sensory Perception - Impaired:  Goal: Able to refrain from responding to false sensory perceptions  Description: Able to refrain from responding to false sensory perceptions  Outcome: Ongoing     Problem: Sleep Pattern Disturbance:  Goal: Appears well-rested  Description: Appears well-rested  Outcome: Ongoing

## 2020-09-27 NOTE — PROGRESS NOTES
Progress Note  German Amato MD    SUBJECTIVE: Patient is seen for Principal Problem:    Altered mental status  Active Problems:    Sepsis without acute organ dysfunction (HCC)    Somnolence    Elevated troponin    TIA (transient ischemic attack)    LISANDRA (acute kidney injury) ([address]  Resolved Problems:    * No resolved hospital problems. *    He is feeling better  Renal function improving  He has no chest pain,palpitations  His troponin is still elevated  hb7.6    OBJECTIVE:    Vitals:   Vitals:    09/27/20 0903   BP: (!) 167/63   Pulse:    Resp:    Temp:    SpO2:      Weight: 231 lb 7.7 oz (105 kg)   Height: 6' 1\" (185.4 cm)   -----------------------------------------------------------------  Exam:    CONSTITUTIONAL:  awake, alert, cooperative, no apparent distress, and appears stated age  EYES:  Lids and lashes normal, pupils equal, round and reactive to light, extra ocular muscles intact, sclera clear, conjunctiva normal  ENT:  Normocephalic, without obvious abnormality, atraumatic, sinuses nontender on palpation, external ears without lesions, oral pharynx with moist mucus membranes, tonsils without erythema or exudates, gums normal and good dentition.   NECK:  Supple, symmetrical, trachea midline, no adenopathy, thyroid symmetric, not enlarged and no tenderness, skin normal  HEMATOLOGIC/LYMPHATICS:  no cervical lymphadenopathy  LUNGS:  No increased work of breathing, good air exchange, clear to auscultation bilaterally, no crackles or wheezing  CARDIOVASCULAR:  S1,s2,no gallop,no edems  ABDOMEN:  Soft,non tender    MUSCULOSKELETAL:  there is no redness, warmth, or swelling of the joints  NEUROLOGIC:  Has no focal deficit  SKIN:  no bruising or bleeding  EXT:     no cyanosis, clubbing or edema present    -----------------------------------------------------------------  Diagnostic Data: Reviewed    More Recent Labs:    Results for orders placed or performed during the hospital encounter of 09/24/20   Culture, Blood 2    Specimen: Blood   Result Value Ref Range    Specimen Description . BLOOD     Special Requests 20ML LAC     Culture NO GROWTH 3 DAYS    Culture, Blood 1    Specimen: Blood   Result Value Ref Range    Specimen Description . BLOOD     Special Requests 20 ML RFA     Culture NO GROWTH 3 DAYS    CBC Auto Differential   Result Value Ref Range    WBC 6.0 3.5 - 11.3 k/uL    RBC 2.75 (L) 4.21 - 5.77 m/uL    Hemoglobin 8.0 (L) 13.0 - 17.0 g/dL    Hematocrit 25.1 (L) 40.7 - 50.3 %    MCV 91.3 82.6 - 102.9 fL    MCH 29.1 25.2 - 33.5 pg    MCHC 31.9 28.4 - 34.8 g/dL    RDW 14.2 11.8 - 14.4 %    Platelets 730 470 - 695 k/uL    MPV 9.7 8.1 - 13.5 fL    NRBC Automated 0.0 0.0 per 100 WBC    Differential Type NOT REPORTED     Seg Neutrophils 68 (H) 36 - 65 %    Lymphocytes 14 (L) 24 - 43 %    Monocytes 15 (H) 3 - 12 %    Eosinophils % 3 1 - 4 %    Basophils 0 0 - 2 %    Immature Granulocytes 0 0 %    Segs Absolute 4.03 1.50 - 8.10 k/uL    Absolute Lymph # 0.86 (L) 1.10 - 3.70 k/uL    Absolute Mono # 0.91 0.10 - 1.20 k/uL    Absolute Eos # 0.18 0.00 - 0.44 k/uL    Basophils Absolute <0.03 0.00 - 0.20 k/uL    Absolute Immature Granulocyte <0.03 0.00 - 0.30 k/uL    WBC Morphology NOT REPORTED     RBC Morphology NOT REPORTED     Platelet Estimate NOT REPORTED    Basic Metabolic Panel   Result Value Ref Range    Glucose 123 (H) 70 - 99 mg/dL    BUN 45 (H) 8 - 23 mg/dL    CREATININE 2.39 (H) 0.70 - 1.20 mg/dL    Bun/Cre Ratio 19 9 - 20    Calcium 8.2 (L) 8.6 - 10.4 mg/dL    Sodium 137 135 - 144 mmol/L    Potassium 4.5 3.7 - 5.3 mmol/L    Chloride 105 98 - 107 mmol/L    CO2 23 20 - 31 mmol/L    Anion Gap 9 9 - 17 mmol/L    GFR Non-African American 27 (L) >60 mL/min    GFR  32 (L) >60 mL/min    GFR Comment          GFR Staging         Troponin   Result Value Ref Range    Troponin, High Sensitivity 115 (HH) 0 - 22 ng/L    Troponin T NOT REPORTED <0.03 ng/mL    Troponin Interp NOT REPORTED    Urinalysis with 23 20 - 31 mmol/L    Anion Gap 8 (L) 9 - 17 mmol/L    Alkaline Phosphatase 73 40 - 129 U/L    ALT 6 5 - 41 U/L    AST 8 <40 U/L    Total Bilirubin 0.20 (L) 0.3 - 1.2 mg/dL    Total Protein 5.2 (L) 6.4 - 8.3 g/dL    Alb 2.5 (L) 3.5 - 5.2 g/dL    Albumin/Globulin Ratio 0.9 (L) 1.0 - 2.5    GFR Non-African American 28 (L) >60 mL/min    GFR  33 (L) >60 mL/min    GFR Comment          GFR Staging         Glucose, Whole Blood   Result Value Ref Range    POC Glucose 216 (H) 74 - 100 mg/dL   Lipid Panel   Result Value Ref Range    Cholesterol 121 <200 mg/dL    HDL 36 (L) >40 mg/dL    LDL Cholesterol 67 0 - 130 mg/dL    Chol/HDL Ratio 3.4 <5    Triglycerides 90 <150 mg/dL    VLDL NOT REPORTED 1 - 30 mg/dL   Ammonia   Result Value Ref Range    Ammonia 19 16 - 60 umol/L   Lactate, Sepsis   Result Value Ref Range    Lactic Acid, Sepsis 0.8 0.5 - 1.9 mmol/L    Lactic Acid, Sepsis, Whole Blood NOT REPORTED 0.5 - 1.9 mmol/L   Lactate, Sepsis   Result Value Ref Range    Lactic Acid, Sepsis 0.4 (L) 0.5 - 1.9 mmol/L    Lactic Acid, Sepsis, Whole Blood NOT REPORTED 0.5 - 1.9 mmol/L   Glucose, Whole Blood   Result Value Ref Range    POC Glucose 224 (H) 74 - 100 mg/dL   Protein / creatinine ratio, urine   Result Value Ref Range    Total Protein, Urine 258 mg/dL    Creatinine, Ur 60.6 39.0 - 259.0 mg/dL    Urine Total Protein Creatinine Ratio 4.26 (H) 0.00 - 0.20   CBC   Result Value Ref Range    WBC 6.3 3.5 - 11.3 k/uL    RBC 2.58 (L) 4.21 - 5.77 m/uL    Hemoglobin 7.6 (L) 13.0 - 17.0 g/dL    Hematocrit 24.6 (L) 40.7 - 50.3 %    MCV 95.3 82.6 - 102.9 fL    MCH 29.5 25.2 - 33.5 pg    MCHC 30.9 28.4 - 34.8 g/dL    RDW 14.3 11.8 - 14.4 %    Platelets 110 075 - 767 k/uL    MPV 9.8 8.1 - 13.5 fL    NRBC Automated 0.0 0.0 per 100 WBC   [place]/Lambda Free Lt Chains, Serum Quant   Result Value Ref Range    Kappa Free Light Chains QNT 8.56 (H) 0.37 - 1.94 mg/dL    Lambda Free Light Chains QNT 6.49 (H) 0.57 - 2.63 mg/dL    Free Kappa/Lambda Ratio 1.32 0.26 - 1.65   Electrophoresis Protein, Serum without Reflex to Immunofixation   Result Value Ref Range    Total Protein 4.8 (L) 6.4 - 8.3 g/dL    Albumin (calculated) PENDING g/dL    Albumin % PENDING %    Alpha-1-Globulin PENDING g/dL    Alpha 1 % PENDING %    Alpha-2-Globulin PENDING g/dL    Alpha 2 % PENDING %    Beta Globulin PENDING g/dL    Beta Percent PENDING %    Gamma Globulin PENDING g/dL    Gamma Globulin % PENDING %    Total Prot. Sum PENDING g/dL    Total Prot. Sum,% PENDING %    Protein Electrophoresis, Serum PENDING     Pathologist PENDING    Basic Metabolic Panel   Result Value Ref Range    Glucose 150 (H) 70 - 99 mg/dL    BUN 39 (H) 8 - 23 mg/dL    CREATININE 2.43 (H) 0.70 - 1.20 mg/dL    Bun/Cre Ratio 16 9 - 20    Calcium 8.0 (L) 8.6 - 10.4 mg/dL    Sodium 141 135 - 144 mmol/L    Potassium 4.0 3.7 - 5.3 mmol/L    Chloride 109 (H) 98 - 107 mmol/L    CO2 22 20 - 31 mmol/L    Anion Gap 10 9 - 17 mmol/L    GFR Non-African American 26 (L) >60 mL/min    GFR  32 (L) >60 mL/min    GFR Comment          GFR Staging         Ferritin   Result Value Ref Range    Ferritin 189 30 - 400 ug/L   Glucose, Whole Blood   Result Value Ref Range    POC Glucose 230 (H) 74 - 100 mg/dL   Troponin   Result Value Ref Range    Troponin, High Sensitivity 136 (HH) 0 - 22 ng/L    Troponin T NOT REPORTED <0.03 ng/mL    Troponin Interp NOT REPORTED    COVID-19    Specimen: Other   Result Value Ref Range    SARS-CoV-2          SARS-CoV-2, Rapid Not Detected Not Detected    Source . NASOPHARYNGEAL     SARS-CoV-2         Glucose, Whole Blood   Result Value Ref Range    POC Glucose 200 (H) 74 - 100 mg/dL   Basic Metabolic Panel   Result Value Ref Range    Glucose 116 (H) 70 - 99 mg/dL    BUN 33 (H) 8 - 23 mg/dL    CREATININE 2.42 (H) 0.70 - 1.20 mg/dL    Bun/Cre Ratio 14 9 - 20    Calcium 8.1 (L) 8.6 - 10.4 mg/dL    Sodium 141 135 - 144 mmol/L    Potassium 4.6 3.7 - 5.3 mmol/L    Chloride 107 98 - 107 mmol/L    CO2 23 20 - 31 mmol/L    Anion Gap 11 9 - 17 mmol/L    GFR Non-African American 26 (L) >60 mL/min    GFR  32 (L) >60 mL/min    GFR Comment          GFR Staging         Glucose, Whole Blood   Result Value Ref Range    POC Glucose 228 (H) 74 - 100 mg/dL   Glucose, Whole Blood   Result Value Ref Range    POC Glucose 98 74 - 100 mg/dL   EKG 12 Lead   Result Value Ref Range    Ventricular Rate 75 BPM    Atrial Rate 75 BPM    P-R Interval 220 ms    QRS Duration 108 ms    Q-T Interval 424 ms    QTc Calculation (Bazett) 473 ms    P Axis 67 degrees    R Axis 39 degrees    T Axis 82 degrees   EKG 12 Lead   Result Value Ref Range    Ventricular Rate 79 BPM    Atrial Rate 79 BPM    P-R Interval 226 ms    QRS Duration 116 ms    Q-T Interval 424 ms    QTc Calculation (Bazett) 486 ms    P Axis 72 degrees    R Axis 60 degrees    T Axis 93 degrees   EKG 12 Lead   Result Value Ref Range    Ventricular Rate 82 BPM    Atrial Rate 82 BPM    P-R Interval 232 ms    QRS Duration 120 ms    Q-T Interval 424 ms    QTc Calculation (Bazett) 495 ms    P Axis 64 degrees    R Axis 21 degrees    T Axis 85 degrees       ASSESSMENT:   Patient Active Problem List    Diagnosis Date Noted    Altered mental status 09/25/2020    LISANDRA (acute kidney injury) ([address] 09/25/2020    Sepsis without acute organ dysfunction (HCC) 09/24/2020    Somnolence 09/24/2020    Elevated troponin 09/24/2020    TIA (transient ischemic attack) 09/24/2020    BPH with obstruction/lower urinary tract symptoms 10/16/2019    Neurogenic bladder 10/16/2019    Other constipation 10/16/2019    Spondylosis of lumbosacral region without myelopathy or radiculopathy 10/15/2019    Urinary retention 09/07/2019    Fecal incontinence 09/07/2019    H/O: CVA (cerebrovascular accident) 09/06/2019    Lumbar radiculopathy 09/05/2019    Hypertension     Diabetes mellitus ([address]     Intractable back pain 09/04/2019         PLAN:  · Altered mental status- resolved  · Anemia- may be due to myeloma  · LISANDRA- renal function improving,appreciate nephrology opinion  · Elevated troponin- has no chest pain,palpitation. continue monitoring,cardiology follow up.       Jeremy Marcos M.D.

## 2020-09-27 NOTE — PROGRESS NOTES
[facility]   [phone]   Nephrology progress note  9/27/2020, 11:09 AM      Pt Name:    Brice Sargent  MRN:     [id number]     YOB: 1945  Admit Date:    9/24/2020  6:03 PM  Primary Care Physician:  Guero Onofre MD   Room number  1639/8471-21    TELEHEALTH EVALUATION -- Audio/Visual (During KSXDV-59 public health emergency)    Patient's Location: Hospital Room # [phone] (myself) Location: [place] [address] office, [address] [facility], [address] [place] [address]  Patient's nurse: RN Ms. Jonathan Christiansen    Chief Complaint: Nephrology following for LISANDRA/CKD    Subjective:  Patient seen and examined  Awake and alert  Denies any new complaints  Denies any shortness of breath  Tolerating IV fluids well  Answering questions appropriately      Objective:  24HR INTAKE/OUTPUT:      Intake/Output Summary (Last 24 hours) at 9/27/2020 1109  Last data filed at 9/27/2020 0525  Gross per 24 hour   Intake 2508. 14 ml   Output --   Net 2508. 14 ml     I/O last 3 completed shifts: In: 2908.1 [P.O.:1100; I.V.:1808.1]  Out: -   No intake/output data recorded. Admission weight: 225 lb 5 oz (102.2 kg)  Wt Readings from Last 3 Encounters:   09/27/20 231 lb 7.7 oz (105 kg)   07/23/20 217 lb (98.4 kg)   06/29/20 200 lb (90.7 kg)     Body mass index is 30.54 kg/m².     Physical examination  VITALS:     Vitals:    09/27/20 0039 09/27/20 0515 09/27/20 0700 09/27/20 0903   BP: (!) 164/62  (!) 164/61 (!) 167/63   Pulse: 72  70    Resp: 16  18    Temp: 98.3 °F (36.8 °C)  98.8 °F (37.1 °C)    TempSrc: Temporal  Temporal    SpO2: 94%  93%    Weight:  231 lb 7.7 oz (105 kg)     Height:         Constitutional and General Appearance: alert and cooperative, appears comfortable, no distress  Lungs: no use of accessory muscles or labored breathing noted, Respiratory effort observed to be normal  Extremities: No visible swelling  Skin:  No discoloration noted on facial skin  Neuro: no slurred speech, no facial drooping noted  Psychiatric:  Not agitated  Mental status: awake and not in distress    Due to this being a TeleHealth encounter, evaluation of the following organ systems is limited: EENT/Resp/CV/GI//MS/Neuro/Skin/Lymph    Lab Data  CBC:   Recent Labs     09/24/20  1831 09/25/20  0603 09/25/20  1402   WBC 6.0 4.5 6.3   HGB 8.0* 7.3* 7.6*   HCT 25.1* 23.5* 24.6*    267 279     BMP:  Recent Labs     09/25/20  0603 09/26/20  0552 09/27/20  0610    141 141   K 3.9 4.0 4.6    109* 107   CO2 23 22 23   BUN 42* 39* 33*   CREATININE 2.33* 2.43* 2.42*   GLUCOSE 118* 150* 116*   CALCIUM 8.0* 8.0* 8.1*     Hepatic:   Recent Labs     09/25/20  0603   LABALBU 2.5*   AST 8   ALT 6   BILITOT 0.20*   ALKPHOS 73         Meds:  Infusion:    sodium chloride 75 mL/hr at 09/27/20 0916    dextrose       Meds:    carvedilol  3.125 mg Oral BID WC    gabapentin  300 mg Oral TID    glimepiride  2 mg Oral BID WC    sertraline  200 mg Oral Daily    tamsulosin  0.4 mg Oral Daily    amLODIPine  10 mg Oral Daily    insulin lispro  4-10 Units Subcutaneous TID AC    insulin glargine  15 Units Subcutaneous Nightly    sodium chloride flush  10 mL Intravenous 2 times per day    enoxaparin  40 mg Subcutaneous Daily    clopidogrel  75 mg Oral Daily    hydrALAZINE  50 mg Oral 3 times per day     Meds prn: sodium chloride flush, acetaminophen **OR** acetaminophen, polyethylene glycol, promethazine **OR** ondansetron, glucose, dextrose, glucagon (rDNA), dextrose       Impression and Plan:  1. LISANDRA on CKD III  Creatinine high but overall stable over last three days  Will reduce IV fluid rate  Overall renal function stable  If discharged then follow-up with us in the office in 3 to 4 weeks for further evaluation of nephrotic proteinuria  Work-up sent  If here we will see him in the morning  Discussed with patient and staff    2. HTN: Blood pressure readings reviewed. Will increase hydralazine. Monitor. 3. AMS: improved  4.  Nephrotic Proteinuria: initial work-up sent, DM vs other causes  Follow SPEP and KIM, Will need comprehensive evaluation of this- can be completed as outpatient. Discussed with patient. 5. DM  6. Electrolytes: stable  7.  Acid-base status. Stable  8. Right renal cyst  9. Altered mental status. Improved    D/W patient and RN    Paige Freeman MD  [facility]    Pursuant to the emergency declaration under the [facility] waiver authority and the [employer] Act, this Virtual  Visit was conducted, with patient's consent, to reduce the patient's risk of exposure to COVID-19. Caregiver (Patient's RN) was present when appropriate. Virtual visit was done to address concerns as mentioned above. Due to this being a TeleHealth encounter (During TMYJD-25 public health emergency), evaluation of the following organ systems was limited: EENT/Resp/CV/GI//MS/Neuro/Skin/Lymph     Services were provided through a video synchronous discussion virtually to substitute for in-person visit.

## 2020-09-27 NOTE — PROGRESS NOTES
Patient was getting agitated in room and attempting to get out of bed. Patient stated \"i'm ready to get out of this place and i'm getting out of here and going to the bathroom. \" Writer offered bedpan to patient, but refused at this time. Will offer again when Mena Patel goes in to check blood sugar. He was less agitated after sitting and explaining why we aren't letting him get up on his own. He is just ready to go home he kept stating over and over.

## 2020-09-27 NOTE — PROGRESS NOTES
[facility]  Inpatient/Observation/Outpatient Rehabilitation    Date: 2020  Patient Name: Mina South       [x] Inpatient Acute/Observation       []  Outpatient  : 1945       [] Pt no showed for scheduled appointment    [] Pt refused/declined therapy at this time due to:           [x] Pt cancelled due to:  [] No Reason Given   [] Sick/ill   [x] Other:    Pt eval held at this time, per [facility] RN, d/t continued elevated troponin levels.  PT to check again on 2020       Rima Grant, EMILIANO Date: 2020

## 2020-09-27 NOTE — PROGRESS NOTES
Pt yelling out for nurse, Shruthi Lui entered room and pt states he wants to get out of bed, nurse tries to assist pt to sit to edge of bed, pt unable to sit, pt very weak. Pt yelling at nurse, states \" you are not letting me leave\". Pt reoriented pt to place and situation. Pt requests to call wife. Wife connected to pt on the phone. Pt then calms down after talking to wife.

## 2020-09-27 NOTE — PROGRESS NOTES
Progress Note  German Amato MD    SUBJECTIVE: Patient is seen for Principal Problem:    Altered mental status  Active Problems:    Sepsis without acute organ dysfunction (HCC)    Somnolence    Elevated troponin    TIA (transient ischemic attack)    LISANDRA (acute kidney injury) ([facility] [address]  Resolved Problems:    * No resolved hospital problems. *     patient was seen on 9/25/2020 for his follow up  He has no chest pain,palpitations  His troponin is elevated    OBJECTIVE:    Vitals:   Vitals:    09/27/20 0903   BP: (!) 167/63   Pulse:    Resp:    Temp:    SpO2:      Weight: 231 lb 7.7 oz (105 kg)   Height: 6' 1\" (185.4 cm)   -----------------------------------------------------------------  Exam:    CONSTITUTIONAL:  awake, alert, cooperative, no apparent distress, and appears stated age  EYES:  Lids and lashes normal, pupils equal, round and reactive to light, extra ocular muscles intact, sclera clear, conjunctiva normal  ENT:  Normocephalic, without obvious abnormality, atraumatic, sinuses nontender on palpation, external ears without lesions, oral pharynx with moist mucus membranes, tonsils without erythema or exudates, gums normal and good dentition.   NECK:  Supple, symmetrical, trachea midline, no adenopathy, thyroid symmetric, not enlarged and no tenderness, skin normal  HEMATOLOGIC/LYMPHATICS:  no cervical lymphadenopathy  LUNGS:  No increased work of breathing, good air exchange, clear to auscultation bilaterally, no crackles or wheezing  CARDIOVASCULAR:  S1,s2,no gallop,no edems  ABDOMEN:  Soft,non tender    MUSCULOSKELETAL:  there is no redness, warmth, or swelling of the joints  NEUROLOGIC:  Has no focal deficit  SKIN:  no bruising or bleeding  EXT:     no cyanosis, clubbing or edema present    -----------------------------------------------------------------  Diagnostic Data: Reviewed    More Recent Labs:    Results for orders placed or performed during the hospital encounter of 09/24/20   Culture, Blood 2 Specimen: Blood   Result Value Ref Range    Specimen Description . BLOOD     Special Requests 20ML LAC     Culture NO GROWTH 3 DAYS    Culture, Blood 1    Specimen: Blood   Result Value Ref Range    Specimen Description . BLOOD     Special Requests 20 ML RFA     Culture NO GROWTH 3 DAYS    CBC Auto Differential   Result Value Ref Range    WBC 6.0 3.5 - 11.3 k/uL    RBC 2.75 (L) 4.21 - 5.77 m/uL    Hemoglobin 8.0 (L) 13.0 - 17.0 g/dL    Hematocrit 25.1 (L) 40.7 - 50.3 %    MCV 91.3 82.6 - 102.9 fL    MCH 29.1 25.2 - 33.5 pg    MCHC 31.9 28.4 - 34.8 g/dL    RDW 14.2 11.8 - 14.4 %    Platelets 194 970 - 238 k/uL    MPV 9.7 8.1 - 13.5 fL    NRBC Automated 0.0 0.0 per 100 WBC    Differential Type NOT REPORTED     Seg Neutrophils 68 (H) 36 - 65 %    Lymphocytes 14 (L) 24 - 43 %    Monocytes 15 (H) 3 - 12 %    Eosinophils % 3 1 - 4 %    Basophils 0 0 - 2 %    Immature Granulocytes 0 0 %    Segs Absolute 4.03 1.50 - 8.10 k/uL    Absolute Lymph # 0.86 (L) 1.10 - 3.70 k/uL    Absolute Mono # 0.91 0.10 - 1.20 k/uL    Absolute Eos # 0.18 0.00 - 0.44 k/uL    Basophils Absolute <0.03 0.00 - 0.20 k/uL    Absolute Immature Granulocyte <0.03 0.00 - 0.30 k/uL    WBC Morphology NOT REPORTED     RBC Morphology NOT REPORTED     Platelet Estimate NOT REPORTED    Basic Metabolic Panel   Result Value Ref Range    Glucose 123 (H) 70 - 99 mg/dL    BUN 45 (H) 8 - 23 mg/dL    CREATININE 2.39 (H) 0.70 - 1.20 mg/dL    Bun/Cre Ratio 19 9 - 20    Calcium 8.2 (L) 8.6 - 10.4 mg/dL    Sodium 137 135 - 144 mmol/L    Potassium 4.5 3.7 - 5.3 mmol/L    Chloride 105 98 - 107 mmol/L    CO2 23 20 - 31 mmol/L    Anion Gap 9 9 - 17 mmol/L    GFR Non-African American 27 (L) >60 mL/min    GFR  32 (L) >60 mL/min    GFR Comment          GFR Staging         Troponin   Result Value Ref Range    Troponin, High Sensitivity 115 (HH) 0 - 22 ng/L    Troponin T NOT REPORTED <0.03 ng/mL    Troponin Interp NOT REPORTED    Urinalysis with Microscopic   Result Value Ref Range    Color, UA YELLOW YELLOW    Turbidity UA CLEAR CLEAR    Glucose, Ur TRACE (A) NEGATIVE    Bilirubin Urine NEGATIVE NEGATIVE    Ketones, Urine NEGATIVE NEGATIVE    Specific Gravity, UA 1.025 (H) 1.010 - 1.020    Urine Hgb TRACE (A) NEGATIVE    pH, UA 5.5 5.0 - 9.0    Protein, UA 4+ (A) NEGATIVE    Urobilinogen, Urine Normal Normal    Nitrite, Urine NEGATIVE NEGATIVE    Leukocyte Esterase, Urine NEGATIVE NEGATIVE    Urinalysis Comments NOT REPORTED     -          WBC, UA 2 TO 5 0 - 5 /HPF    RBC, UA 0 TO 2 0 - 2 /HPF    Casts UA NOT REPORTED /LPF    Crystals, UA NOT REPORTED None /HPF    Epithelial Cells UA 2 TO 5 0 - 5 /HPF    Renal Epithelial, UA NOT REPORTED 0 /HPF    Bacteria, UA TRACE (A) None    Mucus, UA NOT REPORTED None    Trichomonas, UA NOT REPORTED None    Amorphous, UA TRACE (A) None    Other Observations UA NOT REPORTED NOT REQ.     Yeast, UA NOT REPORTED None   Lactate, Sepsis   Result Value Ref Range    Lactic Acid, Sepsis 0.5 0.5 - 1.9 mmol/L    Lactic Acid, Sepsis, Whole Blood NOT REPORTED 0.5 - 1.9 mmol/L   Troponin   Result Value Ref Range    Troponin, High Sensitivity 124 (HH) 0 - 22 ng/L    Troponin T NOT REPORTED <0.03 ng/mL    Troponin Interp NOT REPORTED    CBC   Result Value Ref Range    WBC 4.5 3.5 - 11.3 k/uL    RBC 2.52 (L) 4.21 - 5.77 m/uL    Hemoglobin 7.3 (L) 13.0 - 17.0 g/dL    Hematocrit 23.5 (L) 40.7 - 50.3 %    MCV 93.3 82.6 - 102.9 fL    MCH 29.0 25.2 - 33.5 pg    MCHC 31.1 28.4 - 34.8 g/dL    RDW 14.4 11.8 - 14.4 %    Platelets 560 380 - 000 k/uL    MPV 10.2 8.1 - 13.5 fL    NRBC Automated 0.0 0.0 per 100 WBC   Comprehensive Metabolic Panel w/ Reflex to MG   Result Value Ref Range    Glucose 118 (H) 70 - 99 mg/dL    BUN 42 (H) 8 - 23 mg/dL    CREATININE 2.33 (H) 0.70 - 1.20 mg/dL    Bun/Cre Ratio 18 9 - 20    Calcium 8.0 (L) 8.6 - 10.4 mg/dL    Sodium 138 135 - 144 mmol/L    Potassium 3.9 3.7 - 5.3 mmol/L    Chloride 107 98 - 107 mmol/L    CO2 23 20 - 31 mmol/L Anion Gap 8 (L) 9 - 17 mmol/L    Alkaline Phosphatase 73 40 - 129 U/L    ALT 6 5 - 41 U/L    AST 8 <40 U/L    Total Bilirubin 0.20 (L) 0.3 - 1.2 mg/dL    Total Protein 5.2 (L) 6.4 - 8.3 g/dL    Alb 2.5 (L) 3.5 - 5.2 g/dL    Albumin/Globulin Ratio 0.9 (L) 1.0 - 2.5    GFR Non-African American 28 (L) >60 mL/min    GFR  33 (L) >60 mL/min    GFR Comment          GFR Staging         Glucose, Whole Blood   Result Value Ref Range    POC Glucose 216 (H) 74 - 100 mg/dL   Lipid Panel   Result Value Ref Range    Cholesterol 121 <200 mg/dL    HDL 36 (L) >40 mg/dL    LDL Cholesterol 67 0 - 130 mg/dL    Chol/HDL Ratio 3.4 <5    Triglycerides 90 <150 mg/dL    VLDL NOT REPORTED 1 - 30 mg/dL   Ammonia   Result Value Ref Range    Ammonia 19 16 - 60 umol/L   Lactate, Sepsis   Result Value Ref Range    Lactic Acid, Sepsis 0.8 0.5 - 1.9 mmol/L    Lactic Acid, Sepsis, Whole Blood NOT REPORTED 0.5 - 1.9 mmol/L   Lactate, Sepsis   Result Value Ref Range    Lactic Acid, Sepsis 0.4 (L) 0.5 - 1.9 mmol/L    Lactic Acid, Sepsis, Whole Blood NOT REPORTED 0.5 - 1.9 mmol/L   Glucose, Whole Blood   Result Value Ref Range    POC Glucose 224 (H) 74 - 100 mg/dL   Protein / creatinine ratio, urine   Result Value Ref Range    Total Protein, Urine 258 mg/dL    Creatinine, Ur 60.6 39.0 - 259.0 mg/dL    Urine Total Protein Creatinine Ratio 4.26 (H) 0.00 - 0.20   CBC   Result Value Ref Range    WBC 6.3 3.5 - 11.3 k/uL    RBC 2.58 (L) 4.21 - 5.77 m/uL    Hemoglobin 7.6 (L) 13.0 - 17.0 g/dL    Hematocrit 24.6 (L) 40.7 - 50.3 %    MCV 95.3 82.6 - 102.9 fL    MCH 29.5 25.2 - 33.5 pg    MCHC 30.9 28.4 - 34.8 g/dL    RDW 14.3 11.8 - 14.4 %    Platelets 622 699 - 694 k/uL    MPV 9.8 8.1 - 13.5 fL    NRBC Automated 0.0 0.0 per 100 WBC   Capac/Lambda Free Lt Chains, Serum Quant   Result Value Ref Range    Kappa Free Light Chains QNT 8.56 (H) 0.37 - 1.94 mg/dL    Lambda Free Light Chains QNT 6.49 (H) 0.57 - 2.63 mg/dL    Free Kappa/Lambda Ratio 1.32 0.26 - 1.65   Electrophoresis Protein, Serum without Reflex to Immunofixation   Result Value Ref Range    Total Protein 4.8 (L) 6.4 - 8.3 g/dL    Albumin (calculated) PENDING g/dL    Albumin % PENDING %    Alpha-1-Globulin PENDING g/dL    Alpha 1 % PENDING %    Alpha-2-Globulin PENDING g/dL    Alpha 2 % PENDING %    Beta Globulin PENDING g/dL    Beta Percent PENDING %    Gamma Globulin PENDING g/dL    Gamma Globulin % PENDING %    Total Prot. Sum PENDING g/dL    Total Prot. Sum,% PENDING %    Protein Electrophoresis, Serum PENDING     Pathologist PENDING    Basic Metabolic Panel   Result Value Ref Range    Glucose 150 (H) 70 - 99 mg/dL    BUN 39 (H) 8 - 23 mg/dL    CREATININE 2.43 (H) 0.70 - 1.20 mg/dL    Bun/Cre Ratio 16 9 - 20    Calcium 8.0 (L) 8.6 - 10.4 mg/dL    Sodium 141 135 - 144 mmol/L    Potassium 4.0 3.7 - 5.3 mmol/L    Chloride 109 (H) 98 - 107 mmol/L    CO2 22 20 - 31 mmol/L    Anion Gap 10 9 - 17 mmol/L    GFR Non-African American 26 (L) >60 mL/min    GFR  32 (L) >60 mL/min    GFR Comment          GFR Staging         Ferritin   Result Value Ref Range    Ferritin 189 30 - 400 ug/L   Glucose, Whole Blood   Result Value Ref Range    POC Glucose 230 (H) 74 - 100 mg/dL   Troponin   Result Value Ref Range    Troponin, High Sensitivity 136 (HH) 0 - 22 ng/L    Troponin T NOT REPORTED <0.03 ng/mL    Troponin Interp NOT REPORTED    COVID-19    Specimen: Other   Result Value Ref Range    SARS-CoV-2          SARS-CoV-2, Rapid Not Detected Not Detected    Source . NASOPHARYNGEAL     SARS-CoV-2         Glucose, Whole Blood   Result Value Ref Range    POC Glucose 200 (H) 74 - 100 mg/dL   Basic Metabolic Panel   Result Value Ref Range    Glucose 116 (H) 70 - 99 mg/dL    BUN 33 (H) 8 - 23 mg/dL    CREATININE 2.42 (H) 0.70 - 1.20 mg/dL    Bun/Cre Ratio 14 9 - 20    Calcium 8.1 (L) 8.6 - 10.4 mg/dL    Sodium 141 135 - 144 mmol/L    Potassium 4.6 3.7 - 5.3 mmol/L    Chloride 107 98 - 107 mmol/L    CO2 23 20 - 31 mmol/L    Anion Gap 11 9 - 17 mmol/L    GFR Non-African American 26 (L) >60 mL/min    GFR  32 (L) >60 mL/min    GFR Comment          GFR Staging         Glucose, Whole Blood   Result Value Ref Range    POC Glucose 228 (H) 74 - 100 mg/dL   Glucose, Whole Blood   Result Value Ref Range    POC Glucose 98 74 - 100 mg/dL   EKG 12 Lead   Result Value Ref Range    Ventricular Rate 75 BPM    Atrial Rate 75 BPM    P-R Interval 220 ms    QRS Duration 108 ms    Q-T Interval 424 ms    QTc Calculation (Bazett) 473 ms    P Axis 67 degrees    R Axis 39 degrees    T Axis 82 degrees   EKG 12 Lead   Result Value Ref Range    Ventricular Rate 79 BPM    Atrial Rate 79 BPM    P-R Interval 226 ms    QRS Duration 116 ms    Q-T Interval 424 ms    QTc Calculation (Bazett) 486 ms    P Axis 72 degrees    R Axis 60 degrees    T Axis 93 degrees   EKG 12 Lead   Result Value Ref Range    Ventricular Rate 82 BPM    Atrial Rate 82 BPM    P-R Interval 232 ms    QRS Duration 120 ms    Q-T Interval 424 ms    QTc Calculation (Bazett) 495 ms    P Axis 64 degrees    R Axis 21 degrees    T Axis 85 degrees       ASSESSMENT:   Patient Active Problem List    Diagnosis Date Noted    Altered mental status 09/25/2020    LISANDRA (acute kidney injury) ([address] 09/25/2020    Sepsis without acute organ dysfunction (HCC) 09/24/2020    Somnolence 09/24/2020    Elevated troponin 09/24/2020    TIA (transient ischemic attack) 09/24/2020    BPH with obstruction/lower urinary tract symptoms 10/16/2019    Neurogenic bladder 10/16/2019    Other constipation 10/16/2019    Spondylosis of lumbosacral region without myelopathy or radiculopathy 10/15/2019    Urinary retention 09/07/2019    Fecal incontinence 09/07/2019    H/O: CVA (cerebrovascular accident) 09/06/2019    Lumbar radiculopathy 09/05/2019    Hypertension     Diabetes mellitus ([address]     Intractable back pain 09/04/2019         PLAN:  · Altered mental status- improved  · LISANDRA- renal function improving,appreciate nephrology opinion  · Elevated troponin- has no chest pain,palpitation. continue monitoring,cardiology follow up.       Snow Mattson M.D.

## 2020-09-27 NOTE — PROGRESS NOTES
Attempted to change IV site d/t protocol, patient agreeable. After setting up supplies, pt became very agitated and stated \"do not poke me again. \" Pt educated on benefits of changing IV site. Pt continues to decline.

## 2020-09-27 NOTE — PROGRESS NOTES
Patient yelling out for nurses. Previous shift and this writer in room. Patient wants out of bed. Patient educated that he is to weak to ambulate or use BSC. We offered him the bed ahuser and he refuses. Nurses reposition patient and realize he already had a BM. Patient was cleaned. Assessment and vitals completed, see charting on all. Patient was alert to person only. Nurse did reorient him  Call light is in reach, bed alarm on and rails up.   Patient was educated to call for help

## 2020-09-28 PROBLEM — D64.9 ANEMIA: Status: ACTIVE | Noted: 2020-01-01

## 2020-09-28 NOTE — PROGRESS NOTES
[facility] [address] SPECIALTY [facility]  OCCUPATIONAL THERAPY  No Visit Note    [] ICU    [x] Acute   Patient: Elaine Blanco  Room: 3407/4869-15      Elaine Blanco not seen on 9/28/2020 at 8:52 AM due to patient refusing to participate in therapy evaluation, noted to have agitation. \"Why cant you people just leave me be today. I am not going to do anything with you. \" Will attempt evaluation at our earliest opportunity.           Signature: Rona Infante, OTR/L

## 2020-09-28 NOTE — PROGRESS NOTES
Sharon Diane am scribing for and in the presence of Satnam Phillips MD, F.A.C.C..    Patient: Samantha Ham  : 1945  Date of Admission: 2020  Primary Care Physician: Jeremy Olvera  Today's Date: 2020    REASON FOR CONSULTATION: Altered Mental Status (increased confusion onset today. ); Extremity Weakness (new onset of right sided weakness today per NH staff); and Urinary Tract Infection (+nitrites and leucocytes in urine dip today. )      HPI: Mr. Fer Champion is a 70 y.o. male with no prior cardiac history who was sent from the nursing home because of altered mental status, weakness and urinary tract infection. Cardiology consulted because of elevated troponin. Patient is drowsy and sleepy but responds to questions appropriately. He denies any prior history of heart disease. He knew that he has chronic kidney disease for the past several month. He has history of diabetes and hypertension. He is not aware of any family history of premature heart disease. He is lifelong non-smoker. He is staying at [facility]. He does snore but has never used a CPAP machine. Exercise Tolerance: Mr. Fer Champion reports that he has a fair exercise tolerance. His says that he could walk around at [facility]. Goes to the bathroom and to the dining area. Patient is admitted to the hospital because of acute on chronic kidney disease, severe anemia and cardiology consulted because of elevated troponin. Ultrasound of the kidneys showed only simple renal cyst.  Nephrology consulted work-up for multiple myeloma in progress. He is feeling better today. No chest pain, pressure or tightness. No significant shortness of breath. No events over the weekend. His echo is unremarkable.     Past Medical History:   Diagnosis Date    BPH (benign prostatic hyperplasia)     [facility]ere[facility] artery occlusion [facility] [address]     Depression     Diabetes mellitus ([facility] [address]     Dorsalgia     Hypertension     Incontinence of feces     Lumbar radiculopathy     TIA (transient ischemic attack)     Urinary retention     UTI (urinary tract infection)        CURRENT ALLERGIES: Patient has no known allergies. REVIEW OF SYSTEMS: Limited review of system because patient is sleepy and drowsy but he was able to answer simple yes/no questions. Positive and negative are mentioned in HPI. Past Surgical History:   Procedure Laterality Date    NERVE SURGERY Left 10/15/2019    LUMBAR FACET-L4-L5, L5-SI performed by Thania Arce MD at [address] History:  Social History     Tobacco Use    Smoking status: Never Smoker    Smokeless tobacco: Never Used   Substance Use Topics    Alcohol use: No    Drug use: No        CURRENT MEDICATIONS:  Outpatient Medications Marked as Taking for the 9/24/20 encounter [facility] Encounter)   Medication Sig Dispense Refill    losartan (COZAAR) 100 MG tablet Take 100 mg by mouth daily      NONFORMULARY Take by mouth daily hyzaar 100-25mg oral      loperamide (IMODIUM) 2 MG capsule Take 2 mg by mouth 4 times daily as needed for Diarrhea      insulin aspart (NOVOLOG FLEXPEN) 100 UNIT/ML injection pen Inject into the skin 4 times daily Sliding scale      amLODIPine (NORVASC) 10 MG tablet Take 10 mg by mouth daily      ammonium lactate (AMLACTIN) 12 % cream Apply topically nightly Apply topically as needed.  hydrALAZINE (APRESOLINE) 25 MG tablet Take 50 mg by mouth 3 times daily       glimepiride (AMARYL) 2 MG tablet Take 2 mg by mouth every morning (before breakfast)       insulin glargine (LANTUS) 100 UNIT/ML injection vial Inject 15 Units into the skin nightly       magnesium hydroxide (MILK OF MAGNESIA) 400 MG/5ML suspension Take 30 mLs by mouth      polyethylene glycol (GLYCOLAX) packet Take 17 g by mouth      sertraline (ZOLOFT) 100 MG tablet Take 200 mg by mouth daily       gabapentin (NEURONTIN) 300 MG capsule Take 300 mg by mouth 3 times daily.       rhythm, normal heart sounds. Exam reveals no gallop and no friction rubs. No heart murmur heard. Pulmonary/Chest: Effort normal and breath sounds normal. No respiratory distress. He has no wheezes, rhonchi or rales. Abdominal: Soft, non-tender. Bowel sounds and aorta are normal. He exhibits no organomegaly, mass or bruit. Extremities: No edema. No cyanosis and no clubbing. Pulses are 2+ radial and carotid pulses. 2+ dorsalis pedis and posterior tibial pulses bilaterally. Skin: Skin is warm and dry. There is no rash or diaphoresis. Psychiatric: He has a normal mood and affect.  His speech is normal and behavior is normal.      MOST RECENT LABS ON RECORD:   Lab Results   Component Value Date    WBC 7.8 09/28/2020    HGB 7.9 (L) 09/28/2020    HCT 24.7 (L) 09/28/2020     09/28/2020    CHOL 121 09/25/2020    TRIG 90 09/25/2020    HDL 36 (L) 09/25/2020    LDLCHOLESTEROL 67 09/25/2020    ALT 6 09/25/2020    AST 8 09/25/2020     09/28/2020    K 4.0 09/28/2020     09/28/2020    CREATININE 2.35 (H) 09/28/2020    BUN 35 (H) 09/28/2020    CO2 20 09/28/2020    TSH 1.45 10/25/2019    PSA 4.21 (H) 03/30/2020    INR 1.0 09/07/2019    LABA1C 7.4 (H) 12/23/2019        ASSESSMENT:  Patient Active Problem List    Diagnosis Date Noted    Anemia 09/28/2020    Altered mental status 09/25/2020    LISANDRA (acute kidney injury) ([facility] [address] 09/25/2020    Sepsis without acute organ dysfunction (HCC) 09/24/2020    Somnolence 09/24/2020    Elevated troponin 09/24/2020    TIA (transient ischemic attack) 09/24/2020    BPH with obstruction/lower urinary tract symptoms 10/16/2019    Neurogenic bladder 10/16/2019    Other constipation 10/16/2019    Spondylosis of lumbosacral region without myelopathy or radiculopathy 10/15/2019    Urinary retention 09/07/2019    Fecal incontinence 09/07/2019    H/O: CVA (cerebrovascular accident) 09/06/2019    Lumbar radiculopathy 09/05/2019    Hypertension     Diabetes mellitus

## 2020-09-28 NOTE — PROGRESS NOTES
[facility] SPECIALTY [facility]  OCCUPATIONAL THERAPY  No Visit Note    [] ICU    [x] Acute   Patient: Brando Mejia  Room: 31/9030-97      Brando Mejia not seen on 9/28/2020 at 1:45 PM due to: patient continues to be agitated, calling RN \"*unt, *itch*\". Nursing reported she gave patient ABBY Quezada requesting to hold Evaluation.         Signature: JOSHUA Maguire/JESSICA

## 2020-09-28 NOTE — PLAN OF CARE
Problem: Pain:  Goal: Pain level will decrease  Description: Pain level will decrease  Outcome: Ongoing  Note: Patient does not have any pain     Problem: Falls - Risk of:  Goal: Will remain free from falls  Description: Will remain free from falls  Outcome: Ongoing  Note: Patient is non ambulatory, HFR, non slip socks on, rails up, bed alarm active  Patient educated to call for assistance   Goal: Absence of physical injury  Description: Absence of physical injury  Outcome: Ongoing  Note: No injury as of this time     Problem: Sleep Pattern Disturbance:  Goal: Appears well-rested  Description: Appears well-rested  Outcome: Ongoing  Note: Patient has not slept all night      Problem: Mood - Altered:  Goal: Verbalizations of feeling emotionally comfortable while being cared for will increase  Description: Verbalizations of feeling emotionally comfortable while being cared for will increase  Outcome: Ongoing     Problem: Injury - Risk of, Physical Injury:  Goal: Absence of physical injury  Description: Absence of physical injury  Outcome: Ongoing  Note: No injury as of this time     Problem: Injury - Risk of, Physical Injury:  Goal: Will remain free from falls  Description: Will remain free from falls  Outcome: Ongoing  Note: Patient is non ambulatory, HFR, non slip socks on, rails up, bed alarm active  Patient educated to call for assistance

## 2020-09-28 NOTE — PROGRESS NOTES
Patient continues to yell out, when nurse goes in room, patient does not need anything. He wants to go home.  Nurse continues to reorient and direct patient

## 2020-09-28 NOTE — PROGRESS NOTES
injection vial, Inject 15 Units into the skin nightly   magnesium hydroxide (MILK OF MAGNESIA) 400 MG/5ML suspension, Take 30 mLs by mouth  polyethylene glycol (GLYCOLAX) packet, Take 17 g by mouth  sertraline (ZOLOFT) 100 MG tablet, Take 200 mg by mouth daily   gabapentin (NEURONTIN) 300 MG capsule, Take 300 mg by mouth 3 times daily. tamsulosin (FLOMAX) 0.4 MG capsule, Take 1 capsule by mouth daily  verapamil (CALAN SR) 240 MG extended release tablet, Take 240 mg by mouth nightly  docusate sodium (COLACE) 100 MG capsule, Take 100 mg by mouth daily   metFORMIN (GLUCOPHAGE) 500 MG tablet, Take 500 mg by mouth 2 times daily (with meals)  Sodium Phosphates (FLEET) 7-19 GM/118ML, Place 1 enema rectally once as needed  glucose (GLUTOSE) 40 % GEL, Take 15 g by mouth as needed   glucagon, rDNA, 1 MG injection, Inject 1 mg into the muscle  bisacodyl (DULCOLAX) 10 MG suppository, Place 10 mg rectally daily as needed for Constipation  acetaminophen (TYLENOL) 500 MG tablet, Take 1,000 mg by mouth 3 times daily as needed for Pain    Current Medications:     Scheduled Meds:    carvedilol  6.25 mg Oral BID WC    hydrALAZINE  100 mg Oral 3 times per day    gabapentin  300 mg Oral TID    glimepiride  2 mg Oral BID WC    sertraline  200 mg Oral Daily    tamsulosin  0.4 mg Oral Daily    amLODIPine  10 mg Oral Daily    insulin lispro  4-10 Units Subcutaneous TID AC    insulin glargine  15 Units Subcutaneous Nightly    sodium chloride flush  10 mL Intravenous 2 times per day    enoxaparin  40 mg Subcutaneous Daily    clopidogrel  75 mg Oral Daily     Continuous Infusions:    dextrose       PRN Meds:  haloperidol, sodium chloride flush, acetaminophen **OR** acetaminophen, polyethylene glycol, promethazine **OR** ondansetron, glucose, dextrose, glucagon (rDNA), dextrose    Input/Output:       I/O last 3 completed shifts: In: 1781.3 [P.O.:450; I.V.:1331.3]  Out: 650 [Urine:650].       Patient Vitals for the past 96 hrs (Last 3 readings):   Weight   20 0415 233 lb 14.5 oz (106.1 kg)   20 0515 231 lb 7.7 oz (105 kg)   20 0500 227 lb 1.2 oz (103 kg)       Vital Signs:   Temperature:  Temp: 99.5 °F (37.5 °C)  TMax:   Temp (24hrs), Av.8 °F (37.1 °C), Min:98.4 °F (36.9 °C), Max:99.5 °F (37.5 °C)    Respirations:  Resp: 18  Pulse:   Pulse: 88  BP:    BP: (!) 146/66  BP Range: Systolic (14DMI), LPR:633 , Min:146 , LSC:007       Diastolic (20JHU), DUO:60, Min:56, Max:81      Physical Examination:     General -- no distress  Oral Mucosa -- moist  Neck --  JVD - no  Extremities -- no edema, moves all extremeties  CNS - awake and alert   Pschy - not agitated, mood and memory normal    Due to this being a TeleHealth encounter, evaluation of the following organ systems is limited: Vitals/EENT/Resp/CV/GI//MS/Neuro/Skin/Heme-Lymph-Imm. Labs:       Recent Labs     20  1402 20  0600   WBC 6.3 7.8   RBC 2.58* 2.70*   HGB 7.6* 7.9*   HCT 24.6* 24.7*   MCV 95.3 91.5   MCH 29.5 29.3   MCHC 30.9 32.0   RDW 14.3 14.2    291   MPV 9.8 10.4      BMP:   Recent Labs     20  0552 20  0610 20  0600    141 138   K 4.0 4.6 4.0   * 107 106   CO2 22 23 20   BUN 39* 33* 35*   CREATININE 2.43* 2.42* 2.35*   GLUCOSE 150* 116* 119*   CALCIUM 8.0* 8.1* 8.3*      Phosphorus:   No results for input(s): PHOS in the last 72 hours. Magnesium:    Recent Labs     20  0600   MG 2.0     Albumin:  No results for input(s): LABALBU in the last 72 hours.   BNP:    No results found for: BNP  ALEJANDRO:      Lab Results   Component Value Date    ALEJANDRO NEGATIVE 10/25/2019     SPEP:  Lab Results   Component Value Date    PROT 4.8 2020    ALBCAL PENDING 2020    ALBPCT PENDING 2020    LABALPH PENDING 2020    LABALPH PENDING 2020    A1PCT PENDING 2020    A2PCT PENDING 2020    LABBETA PENDING 2020    BETAPCT PENDING 2020    GAMGLOB PENDING 2020    GGPCT PENDING 09/26/2020    PATH PENDING 09/26/2020     UPEP:   No results found for: LABPE  C3:   No results found for: C3  C4:   No results found for: C4  MPO ANCA:   No results found for: MPO  PR3 ANCA:   No results found for: PR3  Anti-GBM:   No results found for: GBMABIGG  Hep BsAg:       No results found for: HEPBSAG  Hep C AB:        No results found for: HEPCAB    Urinalysis/Chemistries:      Lab Results   Component Value Date    NITRU NEGATIVE 09/24/2020    COLORU YELLOW 09/24/2020    PHUR 5.5 09/24/2020    WBCUA 2 TO 5 09/24/2020    RBCUA 0 TO 2 09/24/2020    MUCUS NOT REPORTED 09/24/2020    TRICHOMONAS NOT REPORTED 09/24/2020    YEAST NOT REPORTED 09/24/2020    BACTERIA TRACE 09/24/2020    SPECGRAV 1.025 09/24/2020    LEUKOCYTESUR NEGATIVE 09/24/2020    UROBILINOGEN Normal 09/24/2020    BILIRUBINUR NEGATIVE 09/24/2020    GLUCOSEU TRACE 09/24/2020    KETUA NEGATIVE 09/24/2020    AMORPHOUS TRACE 09/24/2020     Urine Sodium:   No results found for: MAME  Urine Potassium:  No results found for: KUR  Urine Chloride:  No results found for: CLUR  Urine Osmolarity: No results found for: OSMOU  Urine Protein:   No components found for: TOTALPROTEIN, URINE   Urine Creatinine:     Lab Results   Component Value Date    LABCREA 60.6 09/25/2020     Urine Eosinophils:  No components found for: UEOS        Impression and Plan:  1. LISANDRA on CKD III: no improvement with IV fluids, anticipate this may be his new baseline. Will stop IV fluids. 2. Proteinuria ~ 4 grams, possibly from diabetes. SPEP is pending, will check other serologies as outpatient. 3. Anemia: heme/onc has been consulted  4. Right renal cyst  5. Mild metabolic acidosis: monitor  6. HTN: Bps are improving  7. DM  8. Altered mental status  9. Dementia        Please don't hesitate to call with any questions.   Electronically signed by Moo Bhatti DO on 9/28/2020 at 12:37 PM

## 2020-09-28 NOTE — PROGRESS NOTES
Progress Note    SUBJECTIVE:  F/u on AMS    OBJECTIVE:  Resting in bed alert but agitated. When asked questions to check orientation, he states \"if you ask me that again I will hit you\" or he will state \"I am not telling you\". He appears to have confusion and disorientation. He denies any pain or CP. He denies SOB. On oxygen at 1L per NC. He continues to have confusion and talking but not to anyone. Vitals:   Vitals:    09/28/20 0715   BP: (!) 146/66   Pulse: 88   Resp: 18   Temp: 99.5 °F (37.5 °C)   SpO2: (!) 86%     Weight: 233 lb 14.5 oz (106.1 kg)   Height: 6' 1\" (185.4 cm)   -----------------------------------------------------------------  Exam:    CONSTITUTIONAL:  awake, alert, cooperative and confused. EYES:  Lids and lashes normal, pupils equal, round and reactive to light, extra ocular muscles intact, sclera clear, conjunctiva normal  ENT:  normocepalic, without obvious abnormality, atraumatic  NECK:  supple, symmetrical, trachea midline, skin normal and no stridor  HEMATOLOGIC/LYMPHATICS:  no cervical lymphadenopathy and no supraclavicular lymphadenopathy  LUNGS:  No increased work of breathing, good air exchange, clear to auscultation bilaterally, no crackles or wheezing  CARDIOVASCULAR:  regular rate and rhythm, normal S1 and S2 and no edema  ABDOMEN:  No scars, normal bowel sounds, soft, non-distended, non-tender, no masses palpated, no hepatosplenomegally  MUSCULOSKELETAL:  there is no redness, warmth, or swelling of the joints  full range of motion noted  motor strength is 5 out of 5 all extremities bilaterally  tone is normal  NEUROLOGIC:  Mental Status Exam:  Level of Alertness:   awake  Orientation:   person  Memory:   abnormal - confusion.   SKIN:  Bruising to right forearm, scabs to dorsal right foot, normal skin color, texture, turgor, no redness, warmth, or swelling, no rashes and no lesions  EXT:     no cyanosis, clubbing or edema present -----------------------------------------------------------------  Diagnostic Data: Reviewed    ASSESSMENT:   Principal Problem:    Altered mental status  Active Problems:    Sepsis without acute organ dysfunction (HCC)    Somnolence    Elevated troponin    TIA (transient ischemic attack)    LISANDRA (acute kidney injury) ([facility] [address]    Anemia  Resolved Problems:    * No resolved hospital problems. *        PLAN:  · AMS  · Fall Precautions  · IV NS at 40 ml/hr  · Sepsis without Acute Organ Dysfunction  · Chest Xray -- Negative  · BC -- No growth  · UA -- No Infection  · No ATB at this time  · Elevated Troponin secondary to CKD  · Appreciate Dr. Catherine Gutierrez  · TIA  · No reoccurance  · LISANDRA  · Medications changed per Cardiology  · Appreciate Nephrology  · Anemia -- Unexplained -- Concern for Multiple Myeloma  · Kappe Free -- 8.56 elevated  · Lambda -- 6.49 elevated  · Awaiting Kappa/Lambda Quant. · No transfusion at this time.   Hgb 7.6 today  · Consult Hem/Occ  · Discharge Plan:  Return to the [facility]  · Covid-19 Negative  · High Risk Medication:  None      SEBLE Dow, NP-C

## 2020-09-28 NOTE — PROGRESS NOTES
PALLIATIVE CARE  Follow up visit with pt and spouse per request of . Per  wife still has questions regarding his [facility] order. In to speak with wife who is present in the room. Spouse, Garland Paige, tells me that Bigg Sterling has been in a nursing home since August 2019. Tells me that he had been complaining of back pain, had multiple testing done and pain management. States that he has not been able to walk and that she is unable to care for him at home. Moved to the [facility] at [facility] in May 2020. Tells me that he has seen a neurologist about 3 times and that he likely has dementia. Tells me that Chanell Guzman has good days and bad days\" and that while in the hospital he seems more confused today. States that due to the COVID-19 health issues that she has only been able to see him for a short time once a week, which makes it difficult for her to know how he is really doing. Support given. Tells me that he is not usually \"belligerent\". During my visit Bigg Sterling tries repeatedly to get out of bed. He dangles his leg over the side of the bed. Will put it back in bed when asked. Wants to sit at the edge of the bed but wife states that he cannot safely do so without help. Discussed will defer to primary nurse. Wife tells me that Bigg Sterling completed advance directives in August of 2019 per recommendation of their . Tells me that she brought a copy of them to the hospital Friday and gave them to the ED registration. Not on paper chart. Health Information Services notified and message left of need for copy for his chart. Discussion regarding current [facility] order. Differences between the two orders discussed. States that she had conflicting information from the [facility] in regards to his wishes for care and that she had told the ED physician that if it was best that he should be intubated. States that once she got to the ED he wasn't intubated and was told that he refused intubation.   Pt is confused this AM and writer's opinion is that he cannot make decisions today. He is looking for his keys, is restless, wants to go home. Wife states that he sometimes would call her in the middle of the night wanting to go home. After ongoing discussion during my visit, wife tells me that she desires to keep the current [facility] order for him. Support given to wife in regards to pt dementia, etc.  They will be  40 years within a couple of months. States that they had spoken in the past about what their wishes for care would be. She is tearful at times. Tells me that she is lonely at home, wishing she could take care of him but knowing she is unable to do so. Support given. Tells me that her oldest daughter has been the most supportive of them. Listening presence and support given. Denies other needs or concerns. Pt has not been sleeping well while in the hospital.  Pt states \"I could sleep if you would stop talking\" when wife encourages him to try to sleep. Copy of DNR order given to wife. Reassured that this is a doctor order and that the [facility] cannot change the order without patient or her consent. Will continue to follow and support. Bethany Rick RN, Marshall Ferraro and Elsie Esposito Nurse Coordinator  9/28/2020 11:13 AM

## 2020-09-28 NOTE — PROGRESS NOTES
Patient continues to be confused, he is asking for his son and says his dogs here. He is on the phone at this time with his wife.  Nurse did reorient the patient

## 2020-09-28 NOTE — PROGRESS NOTES
Met with Patient and his wife this a.m. Patient is a 70year old , white male, admitted with a diagnosis of Altered Mental Status. Patient is alert but confused. His wife of 36 years is present at this time and answering assessment questions. States that she wishes for Patient to return to [facility] when he is deemed medically stable. Patient resides at [facility] since July of this year. Had previously been a resident at [facility] in [facility]. Patient uses a wheelchair for ambulation at the nursing home. He has 3 adult children. 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 Wife describes him as active and involved in his life prior to [employer]. Nursing home manages his medications and transportation needs. PCP is Dr. Ender Roman. He has Medicare and Medicaid insurances to cover the cost of his medications. Discharge plan is to return to [facility] when deemed medically stable. Patient is currently a '[facility]' but wife has questions re:  Current code status. Referred to Palliative Care RN, Tom Ly, for her to address. Patient has no Advance Directives on file at this time. LSW spoke with Tommy Sterling in Admissions at [facility] SURG[facility] and they are able to take Patient back when he is ready. LSW to facilitate details of discharge and assist with needs as they arise.     [address]  9/28/2020

## 2020-09-28 NOTE — PROGRESS NOTES
[facility]  Inpatient/Observation/Outpatient Rehabilitation    Date: 2020  Patient Name: Brice Sargent       [x] Inpatient Acute/Observation       []  Outpatient  : 1945       [] Pt no showed for scheduled appointment    [x] Pt refused/declined therapy at this time due to:      Pt stated he was sleeping and not doing anything else      [] Pt cancelled due to:  [] No Reason Given   [] Sick/ill   [] Other:    Will attempt evaluation at our earliest opportunity.        Vivi Love Date: 2020

## 2020-09-28 NOTE — PROGRESS NOTES
Patient continues to talk to self, and occasional call out for help. Patient continues to be confused.  Will continue to monitor

## 2020-09-28 NOTE — PROGRESS NOTES
Patient continues to try and climb out of bed. He doesn't have the strength to do so. Nurse educated patient that he is to weak and doesn't walk at this time. Per patient wife on admission patient has been non ambulatory. Nurse continues to educate patient.

## 2020-09-28 NOTE — PROGRESS NOTES
Physical Therapy    Facility/Department: [facility] AT [facility] MED SURG  Initial Assessment    NAME: Bety Can  : 1945  MRN: [id number]    Date of Service: 2020    Discharge Recommendations:  ECF with PT, ECF without PT, Continue to assess pending progress   PT Equipment Recommendations  Equipment Needed: No    Assessment   Body structures, Functions, Activity limitations: Decreased functional mobility ; Decreased ADL status; Decreased balance;Decreased posture;Decreased strength;Decreased endurance;Decreased high-level IADLs  Assessment: Pt is a 70 y.o. male who was admitted due to a CVA. He has refused therapy or has not been appropriate for therapy due to cognitive status. Patient demonstrates LE weakness, decreased sitting balance, and decreased endurance. He would benefit from skilled PT to address his deficits to improve functional mobility. Treatment Diagnosis: Generalized weakness  Prognosis: Fair  Decision Making: Medium Complexity  REQUIRES PT FOLLOW UP: Yes  Activity Tolerance  Activity Tolerance: Patient limited by fatigue;Patient limited by cognitive status       Patient Diagnosis(es): The primary encounter diagnosis was Acute CVA (cerebrovascular accident) ([facility] [address] Diagnoses of Elevated troponin, Somnolence, Sepsis without acute organ dysfunction, due to unspecified organism [facility]), Altered mental status, unspecified altered mental status type, Essential hypertension, DM type 2, not at goal [facility]), and DNR (do not resuscitate) discussion were also pertinent to this visit. has a past medical history of BPH (benign prostatic hyperplasia), Cerebral artery occlusion with cerebral infarction ([facility] [address] Depression, Diabetes mellitus ([facility] [address] Dorsalgia, Hypertension, Incontinence of feces, Lumbar radiculopathy, TIA (transient ischemic attack), Urinary retention, and UTI (urinary tract infection).    has a past surgical history that includes Nerve Surgery (Left, of deficits  Initiation: Requires cues for all  Sequencing: Requires cues for all    Objective     Observation/Palpation  Posture: Fair    AROM RLE (degrees)  RLE AROM: WFL  AROM LLE (degrees)  LLE AROM : WFL  Strength RLE  Comment: Grossly 3/5  Strength LLE  Comment: Grossly 3/5     Sensation  Overall Sensation Status: WFL  Bed mobility  Supine to Sit: 2 Person assistance;Maximum assistance  Scooting: Maximal assistance;2 Person assistance  Transfers  Sit to Stand: Maximum Assistance  Stand to sit: Maximum Assistance  Stand Pivot Transfers: Maximum Assistance  Ambulation  Ambulation?: Yes  WB Status: FWB  Ambulation 1  Surface: level tile  Device: Hand-Held Assist  Assistance: Maximum assistance  Quality of Gait: Pt assisted with moving to chair with bent knees, shuffling gait pattern. Distance: 2 steps  Stairs/Curb  Stairs?: No     Balance  Posture: Fair  Sitting - Static: Poor;+  Sitting - Dynamic: Poor  Standing - Static: Poor  Standing - Dynamic: Poor        Plan   Plan  Times per week: 7  Times per day: Twice a day  Plan Comment: 1x/day on weekends  Safety Devices  Type of devices: All fall risk precautions in place    AM-PAC Score  AM-PAC Inpatient Mobility without Stair Climbing Raw Score : 9 (09/28/20 1644)  AM-PAC Inpatient without Stair Climbing T-Scale Score : 32.44 (09/28/20 1644)  Mobility Inpatient CMS 0-100% Score: 76.07 (09/28/20 1644)  Mobility Inpatient without Stair CMS G-Code Modifier : CL (09/28/20 1644)    Goals  Short term goals  Time Frame for Short term goals: 10 days  Short term goal 1: Patient will perform transfers with mod A  Short term goal 2: Patient will perform bed mobility with Min A  Short term goal 3: Patient will tolerate 20-30 minutes of therex/act to improve endurance for ADLs. Short term goal 4: Patient will improve sitting balance to fair.   Patient Goals   Patient goals : None stated       Therapy Time   Individual Concurrent Group Co-treatment   Time In 9464         Time Out 1510         Minutes 15         Timed Code Treatment Minutes: 15 Minutes       Elin Garcia, PT, DPT

## 2020-09-28 NOTE — ACP (ADVANCE CARE PLANNING)
Advance Care Planning     Advance Care Planning Activator (Inpatient)  Conversation Note      Date of ACP Conversation: 9/24/2020    Conversation Conducted with: Patient with Derek 51: Next of Kin by law (only applies in absence of above) (name) Eri Tavera Activator: [address] [facility] makes decisions on behalf of the incapacitated patient: Decision Maker is asked to consider and make decisions based on patient values, known preferences, or best interests. Health Care Decision Maker:     Current Designated Health Care Decision Maker:   Primary Decision Maker: Mary Terry - [phone]    Secondary Decision Maker: Suzanna Hernandez [facility] - [phone]    Supplemental (Other) Decision Maker: Kristen Valdivia - Child      Care Preferences    Ventilation: \"If you were in your present state of health and suddenly became very ill and were unable to breathe on your own, what would your preference be about the use of a ventilator (breathing machine) if it were available to you? \"      Would the patient desire the use of ventilator (breathing machine)?: no    \"If your health worsens and it becomes clear that your chance of recovery is unlikely, what would your preference be about the use of a ventilator (breathing machine) if it were available to you? \"     Would the patient desire the use of ventilator (breathing machine)?: No      Resuscitation  \"CPR works best to restart the heart when there is a sudden event, like a heart attack, in someone who is otherwise healthy. Unfortunately, CPR does not typically restart the heart for people who have serious health conditions or who are very sick. \"    \"In the event your heart stopped as a result of an underlying serious health condition, would you want attempts to be made to restart your heart (answer \"yes\" for attempt to resuscitate) or would you prefer a natural death (answer \"no\" for do not attempt to resuscitate)? \" no       [x] Yes   [] No   Educated Patient / Felicie Jose regarding differences between Advance Directives and portable DNR orders.     Length of ACP Conversation in minutes:      Conversation Outcomes:  [x] ACP discussion completed  [] Existing advance directive reviewed with patient; no changes to patient's previously recorded wishes  [] New Advance Directive completed  [x] Portable Do Not Rescitate prepared for Provider review and signature--copy given to spouse  [] POLST/POST/MOLST/MOST prepared for Provider review and signature      Follow-up plan:    [] Schedule follow-up conversation to continue planning  [x] Referred individual to Provider for additional questions/concerns   [] Advised patient/agent/surrogate to review completed ACP document and update if needed with changes in condition, patient preferences or care setting    [] This note routed to one or more involved healthcare providers

## 2020-09-28 NOTE — PROGRESS NOTES
Patient rounded on, found gown on floor, IV tubing broke. Patient brief changed, new gown, and new IV tubing provided. Patient has c/o SOB and congestion. Nurse could here some auditory wheezing when turning him. Vitals obtained oxygen 86% on RA. Patient sat up, deep breathing exercises, able to get oxygen to 89-90 but patient doesn't maintain and drops to 88-89%. Provided with oxygen at 2l/nc and patient states immediate relief in breathing. Oxygen is at 93 % on 2l/nc.

## 2020-09-28 NOTE — PROGRESS NOTES
Patient bed alarm going off, patient wiggled himself down so low. Patient was incontinent of stool and urine and he had it out brief and onto sheet. Full linen change and patient cleansed up. Patient reminded to stay in bed and call for help with nurse call light.

## 2020-09-28 NOTE — PROGRESS NOTES
Occupational Therapy   Occupational Therapy Initial Assessment  Date: 2020   Patient Name: Samantha Ham  MRN: [id number]     : 1945    Date of Service: 2020    Discharge Recommendations:  ECF with OT, Continue to assess pending progress       Assessment   Performance deficits / Impairments: Decreased functional mobility ; Decreased ADL status; Decreased strength;Decreased safe awareness;Decreased cognition;Decreased balance;Decreased high-level IADLs  Assessment: Patient is a 71 y/o male admitted to hospital due to altered mental status. Patient presented with decreased strength/endurance that is limiting his previous ability to participate in ADL's. Patient to benefit from OT services in order to address these deficits. Treatment Diagnosis: M62.81- Generalized Weakness  Prognosis: Fair  Decision Making: High Complexity  OT Education: OT Role;Plan of Care  Barriers to Learning: cognition  REQUIRES OT FOLLOW UP: Yes  Activity Tolerance  Activity Tolerance: Patient limited by fatigue;Treatment limited secondary to decreased cognition  Activity Tolerance: Good behaviors noted, however confused and requiring increased prompts. Safety Devices  Safety Devices in place: Yes  Type of devices: Chair alarm in place;Call light within reach           Patient Diagnosis(es): The primary encounter diagnosis was Acute CVA (cerebrovascular accident) ([facility] Diagnoses of Elevated troponin, Somnolence, Sepsis without acute organ dysfunction, due to unspecified organism [facility]), Altered mental status, unspecified altered mental status type, Essential hypertension, DM type 2, not at goal [facility]), and DNR (do not resuscitate) discussion were also pertinent to this visit.      has a past medical history of BPH (benign prostatic hyperplasia), Cerebral artery occlusion with cerebral infarction ([facility] [address] [facility] Depression, Diabetes mellitus ([facility] [address] Dorsalgia, Hypertension, Incontinence of feces, Lumbar radiculopathy, TIA (transient ischemic attack), Urinary retention, and UTI (urinary tract infection). has a past surgical history that includes Nerve Surgery (Left, 10/15/2019). Treatment Diagnosis: M62.81- Generalized Weakness      Restrictions  Restrictions/Precautions  Restrictions/Precautions: General Precautions, Fall Risk    Subjective   General  Chart Reviewed: Yes  Patient assessed for rehabilitation services?: Yes  Family / Caregiver Present: Yes  Referring Practitioner: Dr. Brian Ramos  Diagnosis: Altered Mental Status  Subjective  Subjective: Patient lying in bed upon arrival, agreeable to OT evaluation. Had some inconsistencies in report in regards to where he is living and his ability to perform ADL's clarified details with patient's wife. Social/Functional History  Social/Functional History  Lives With: Other (comment)  Type of Home: Facility  Bathroom Toilet: Handicap height  Bathroom Equipment: Shower chair, Grab bars in shower  Home Equipment: [address] Help From: Personal care attendant  ADL Assistance: Needs assistance  Homemaking Assistance: Needs assistance  Ambulation Assistance: Needs assistance  Transfer Assistance: Needs assistance  Active : No  Additional Comments: Patient poor historian, reports that he can walk with a walker at [facility], however wife reports that patient is unable to walk and thinks that he can. Objective   Vision: Impaired  Vision Exceptions: Wears glasses at all times  Hearing: Within functional limits    Orientation  Overall Orientation Status: Impaired  Orientation Level: Oriented to person  Observation/Palpation  Posture: Fair     ADL  Feeding: Setup  Grooming: Moderate assistance  UE Bathing:  Moderate assistance  LE Bathing: Dependent/Total  UE Dressing: Maximum assistance  LE Dressing: Dependent/Total  Toileting: Dependent/Total        Bed mobility  Supine to Sit: 2 Person assistance;Maximum assistance  Scooting: Maximal assistance;2 Person assistance  Transfers  Sit Pivot Transfers: Maximum assistance;2 Person assistance  Transfer Comments: max A of 2 required for safety for a sit-pivot transfer. LUE AROM (degrees)  LUE AROM : WFL  Left Hand AROM (degrees)  Left Hand AROM: WFL  RUE AROM (degrees)  RUE AROM : WFL  Right Hand AROM (degrees)  Right Hand AROM: WFL  LUE Strength  Gross LUE Strength: WFL  L Hand General: 4-/5  RUE Strength  Gross RUE Strength: WFL  R Hand General: 4-/5                   Plan   Plan  Times per week: 7  Times per day: Daily  Current Treatment Recommendations: Strengthening, Endurance Training, Neuromuscular Re-education, Self-Care / ADL, Safety Education & Training, Balance Training      AM-PAC Score        AM-[facility] Inpatient Daily Activity Raw Score: 12 (09/28/20 1623)  AM-PAC Inpatient ADL T-Scale Score : 30.6 (09/28/20 1623)  ADL Inpatient CMS 0-100% Score: 66.57 (09/28/20 1623)  ADL Inpatient CMS G-Code Modifier : CL (09/28/20 1623)    Goals  Short term goals  Time Frame for Short term goals: 10 visits  Short term goal 1: Patient to participate in AROM/ther-ex exercises to increase strength/endurance for ADLs with moderate cuing. Short term goal 2: Patient to complete functional transfers with mod A of 2. Short term goal 3: Patient to complete ADL grooming tasks given setup and min A.        Therapy Time   Individual Concurrent Group Co-treatment   Time In 1501         Time Out 1514         Minutes [address], OTR/L

## 2020-09-29 NOTE — PROGRESS NOTES
Renal Progress Note  [facility]    Patient :  Bety Can; 70 y.o. MRN# [id number]  Location:  1853/4229-29  Attending:  Renard Espinal MD  Admit Date:  9/24/2020   Hospital Day: 5       TELEHEALTH EVALUATION -- Audio/Visual (During [id number] public health emergency)     Telehealth service was provided with the patient at his room in [facility] and myself the physician in my office in [place], [place] and patient's RN Leslie Soares who has initiated the visit. Pursuant to the emergency declaration under the [address] [place] [address], [facility], [facility] waiver authority and the [employer] Act, this Virtual  Visit was conducted, with patient's consent, to reduce the patient's risk of exposure to COVID-19 and provide continuity of care for an established patient. Services were provided through a video synchronous discussion virtually to substitute for in-person clinic visit. Subjective:     Nephrology is following the patient for LISANDRA/CKD. Patient seen via video visit. On 2 L NC. Denies SOB. No edema. Bps improved from yesterday. Good appetite. Outpatient Medications:     Medications Prior to Admission: losartan (COZAAR) 100 MG tablet, Take 100 mg by mouth daily  NONFORMULARY, Take by mouth daily hyzaar 100-25mg oral  loperamide (IMODIUM) 2 MG capsule, Take 2 mg by mouth 4 times daily as needed for Diarrhea  insulin aspart (NOVOLOG FLEXPEN) 100 UNIT/ML injection pen, Inject into the skin 4 times daily Sliding scale  amLODIPine (NORVASC) 10 MG tablet, Take 10 mg by mouth daily  ammonium lactate (AMLACTIN) 12 % cream, Apply topically nightly Apply topically as needed.   hydrALAZINE (APRESOLINE) 25 MG tablet, Take 50 mg by mouth 3 times daily   glimepiride (AMARYL) 2 MG tablet, Take 2 mg by mouth every morning (before breakfast)   insulin glargine (LANTUS) 100 UNIT/ML injection vial, Inject 15 Units into the skin nightly   magnesium hydroxide (MILK OF MAGNESIA) 400 MG/5ML suspension, Take 30 mLs by mouth  polyethylene glycol (GLYCOLAX) packet, Take 17 g by mouth  sertraline (ZOLOFT) 100 MG tablet, Take 200 mg by mouth daily   gabapentin (NEURONTIN) 300 MG capsule, Take 300 mg by mouth 3 times daily. tamsulosin (FLOMAX) 0.4 MG capsule, Take 1 capsule by mouth daily  verapamil (CALAN SR) 240 MG extended release tablet, Take 240 mg by mouth nightly  docusate sodium (COLACE) 100 MG capsule, Take 100 mg by mouth daily   metFORMIN (GLUCOPHAGE) 500 MG tablet, Take 500 mg by mouth 2 times daily (with meals)  Sodium Phosphates (FLEET) 7-19 GM/118ML, Place 1 enema rectally once as needed  glucose (GLUTOSE) 40 % GEL, Take 15 g by mouth as needed   glucagon, rDNA, 1 MG injection, Inject 1 mg into the muscle  bisacodyl (DULCOLAX) 10 MG suppository, Place 10 mg rectally daily as needed for Constipation  acetaminophen (TYLENOL) 500 MG tablet, Take 1,000 mg by mouth 3 times daily as needed for Pain    Current Medications:     Scheduled Meds:    carvedilol  12.5 mg Oral BID WC    hydrALAZINE  100 mg Oral 3 times per day    gabapentin  300 mg Oral TID    glimepiride  2 mg Oral BID WC    sertraline  200 mg Oral Daily    tamsulosin  0.4 mg Oral Daily    amLODIPine  10 mg Oral Daily    insulin lispro  4-10 Units Subcutaneous TID AC    insulin glargine  15 Units Subcutaneous Nightly    sodium chloride flush  10 mL Intravenous 2 times per day    enoxaparin  40 mg Subcutaneous Daily    clopidogrel  75 mg Oral Daily     Continuous Infusions:    dextrose       PRN Meds:  haloperidol, sodium chloride flush, acetaminophen **OR** acetaminophen, polyethylene glycol, promethazine **OR** ondansetron, glucose, dextrose, glucagon (rDNA), dextrose    Input/Output:       I/O last 3 completed shifts:   In: 240 [P.O.:240]  Out: - .      Patient Vitals for the past 96 hrs (Last 3 readings):   Weight   09/29/20 0427 232 lb 12.9 oz (105.6 kg) 20 0415 233 lb 14.5 oz (106.1 kg)   20 0515 231 lb 7.7 oz (105 kg)       Vital Signs:   Temperature:  Temp: 98.7 °F (37.1 °C)  TMax:   Temp (24hrs), Av.8 °F (37.1 °C), Min:98.1 °F (36.7 °C), Max:99.5 °F (37.5 °C)    Respirations:  Resp: 16  Pulse:   Pulse: 77  BP:    BP: (!) 156/63  BP Range: Systolic (96JIY), JSH:659 , Min:129 , LGK:791       Diastolic (75UVZ), TEQ:87, Min:53, Max:68      Physical Examination:     General -- no distress  Oral Mucosa -- moist  Neck --  JVD - no  Extremities -- no edema, moves all extremeties  CNS - awake and alert   Pschy - not agitated, mood and memory normal    Due to this being a TeleHealth encounter, evaluation of the following organ systems is limited: Vitals/EENT/Resp/CV/GI//MS/Neuro/Skin/Heme-Lymph-Imm. Labs:       Recent Labs     20  0600 20  0630   WBC 7.8 7.4   RBC 2.70* 2.50*   HGB 7.9* 7.3*   HCT 24.7* 23.3*   MCV 91.5 93.2   MCH 29.3 29.2   MCHC 32.0 31.3   RDW 14.2 14.5*    267   MPV 10.4 10.0      BMP:   Recent Labs     20  0610 20  0600 20  0630    138 138   K 4.6 4.0 3.6*    106 107   CO2 23 20 19*   BUN 33* 35* 39*   CREATININE 2.42* 2.35* 2.52*   GLUCOSE 116* 119* 137*   CALCIUM 8.1* 8.3* 8.2*      Phosphorus:   No results for input(s): PHOS in the last 72 hours. Magnesium:    Recent Labs     20  0600   MG 2.0     Albumin:  No results for input(s): LABALBU in the last 72 hours. BNP:    No results found for: BNP  ALEJANDRO:      Lab Results   Component Value Date    ALEJANDRO NEGATIVE 10/25/2019     SPEP:  Lab Results   Component Value Date    PROT 4.8 2020    ALBCAL 2.7 2020    ALBPCT 56 2020    LABALPH 0.2 2020    LABALPH 0.8 2020    A1PCT 4 2020    A2PCT 17 2020    LABBETA 0.6 2020    BETAPCT 11 2020    GAMGLOB 0.6 2020    GGPCT 12 2020    PATH ELECTRONICALLY SIGNED.  Maritza Hardy M.D. 2020     UPEP:   No results found for: LABPE  C3:   No results found for: C3  C4:   No results found for: C4  MPO ANCA:   No results found for: MPO  PR3 ANCA:   No results found for: PR3  Anti-GBM:   No results found for: GBMABIGG  Hep BsAg:       No results found for: HEPBSAG  Hep C AB:        No results found for: HEPCAB    Urinalysis/Chemistries:      Lab Results   Component Value Date    NITRU NEGATIVE 09/24/2020    COLORU YELLOW 09/24/2020    PHUR 5.5 09/24/2020    WBCUA 2 TO 5 09/24/2020    RBCUA 0 TO 2 09/24/2020    MUCUS NOT REPORTED 09/24/2020    TRICHOMONAS NOT REPORTED 09/24/2020    YEAST NOT REPORTED 09/24/2020    BACTERIA TRACE 09/24/2020    SPECGRAV 1.025 09/24/2020    LEUKOCYTESUR NEGATIVE 09/24/2020    UROBILINOGEN Normal 09/24/2020    BILIRUBINUR NEGATIVE 09/24/2020    GLUCOSEU TRACE 09/24/2020    KETUA NEGATIVE 09/24/2020    AMORPHOUS TRACE 09/24/2020     Urine Sodium:   No results found for: MAME  Urine Potassium:  No results found for: KUR  Urine Chloride:  No results found for: CLUR  Urine Osmolarity: No results found for: OSMOU  Urine Protein:   No components found for: TOTALPROTEIN, URINE   Urine Creatinine:     Lab Results   Component Value Date    LABCREA 60.6 09/25/2020     Urine Eosinophils:  No components found for: UEOS        Impression and Plan:  1. CKD IV: no improvement with IV fluids, anticipate this is patient's new baseline. Will need to evaluate for underlying glomerular disorder given the proteinuria. Further work up can be done in the outpatient setting. Follow up serum immunofixation. 2. Proteinuria ~ 4 grams, possibly from diabetes, see above  3. Anemia: heme/onc has been consulted  4. Right renal cyst  5. Metabolic acidosis: worse today will add po sodium bicarbonate  6. HTN: Bps are improving  7. DM  8. Altered mental status  9. Dementia        Please don't hesitate to call with any questions.   Electronically signed by [id number] Marguerite Esposito DO on 9/29/2020 at 2:42 PM

## 2020-09-29 NOTE — PROGRESS NOTES
[facility] SPECIALTY [facility]  OCCUPATIONAL THERAPY  No Visit Note    [] ICU    [x] Acute   Patient: Mercedez Woo  Room: 9303/8109-50      Mercedez [facility] not seen on 9/29/2020 at 10:06 AM due to refusal with increased agitation as encouragement and education provided. Daija Lopez, is aware.           Signature: Radu Ward OTR/JESSICA

## 2020-09-29 NOTE — PROGRESS NOTES
Writer checked on patient and O2 was off and patient was sleeping. Writer tried to put O2 on, patient woke up and would not let writer put O2 on. Writer tried to educate, pt still refusing and turning head away.

## 2020-09-29 NOTE — PROGRESS NOTES
[facility]  Inpatient/Observation/Outpatient Rehabilitation    Date: 2020  Patient Name: Cheryl Caceres       [x] Inpatient Acute/Observation       []  Outpatient  : 1945       [] Pt no showed for scheduled appointment    [x] Pt refused/declined therapy at this time due to:           [] Pt cancelled due to:  [] No Reason Given   [] Sick/ill   [] Other:    Pt not seen for physical therapy at 10:10am d/t refusal with increased agitation as encouragement and education provided on importance of increased activity. Pt acknowledges understanding but continues to refuse. George Dillon is aware.        Angelica Armenta, PTA Date: 2020

## 2020-09-29 NOTE — PROGRESS NOTES
Pt is heard yelling down the xiong way for help. When writer entered the room, pt's legs were hanging off the bed and his feet touching the floor. Writer asks patient to put his legs back in the bed and he states \"I am not putting my legs into the bed until I find my wife. \" Pt's wife attempted to be contacted x2, unsuccessfully. Pt still refuses to place legs back into bed until he talks to hs wife. Pt is confused and oriented to self only at this time. Pt attempted to be reoriented but only becomes more agitated asking about his wife. Writer and Devan Jesus RN remain at patient bedside at this time for patient safety.

## 2020-09-29 NOTE — PLAN OF CARE
Problem: Pain:  Goal: Pain level will decrease  Description: Pain level will decrease  Outcome: Ongoing  Note: Pain assessed routinely and as needed with a FLACC scale. PRN pain medication given as appropriate per orders. Pain reassessed within an hour, will continue to monitor    Goal: Control of acute pain  Description: Control of acute pain  Outcome: Ongoing  Goal: Control of chronic pain  Description: Control of chronic pain  Outcome: Ongoing     Problem: Falls - Risk of:  Goal: Will remain free from falls  Description: Will remain free from falls  Outcome: Ongoing  Note: Fall risk assessment done and patient is a high risk for falls. Alarms on as needed for patient safety. Patient being monitored on a regular basis. No falls noted at this time. Goal: Absence of physical injury  Description: Absence of physical injury  Outcome: Ongoing     Problem: Skin Integrity:  Goal: Will show no infection signs and symptoms  Description: Will show no infection signs and symptoms  Outcome: Ongoing  Note: Patient is able to turn self as needed. No new open areas or redness noted. Will continue to assess     Goal: Absence of new skin breakdown  Description: Absence of new skin breakdown  Outcome: Ongoing     Problem: Suicide risk  Goal: Provide patient with safe environment  Description: Provide patient with safe environment  Outcome: Ongoing     Problem: Confusion - Acute:  Goal: Absence of continued neurological deterioration signs and symptoms  Description: Absence of continued neurological deterioration signs and symptoms  Outcome: Ongoing  Note: Patient is confused to everything but self. Will continue to monitor.   Goal: Mental status will be restored to baseline  Description: Mental status will be restored to baseline  Outcome: Ongoing     Problem: Discharge Planning:  Goal: Ability to perform activities of daily living will improve  Description: Ability to perform activities of daily living will improve  Outcome: Ongoing  Goal: Participates in care planning  Description: Participates in care planning  Outcome: Ongoing     Problem: Injury - Risk of, Physical Injury:  Goal: Will remain free from falls  Description: Will remain free from falls  Outcome: Ongoing  Note: Fall risk assessment done and patient is a high risk for falls. Alarms on as needed for patient safety. Patient being monitored on a regular basis. No falls noted at this time.      Goal: Absence of physical injury  Description: Absence of physical injury  Outcome: Ongoing     Problem: Mood - Altered:  Goal: Mood stable  Description: Mood stable  Outcome: Ongoing  Goal: Absence of abusive behavior  Description: Absence of abusive behavior  Outcome: Ongoing  Goal: Verbalizations of feeling emotionally comfortable while being cared for will increase  Description: Verbalizations of feeling emotionally comfortable while being cared for will increase  Outcome: Ongoing     Problem: Psychomotor Activity - Altered:  Goal: Absence of psychomotor disturbance signs and symptoms  Description: Absence of psychomotor disturbance signs and symptoms  Outcome: Ongoing     Problem: Sensory Perception - Impaired:  Goal: Demonstrations of improved sensory functioning will increase  Description: Demonstrations of improved sensory functioning will increase  Outcome: Ongoing  Goal: Decrease in sensory misperception frequency  Description: Decrease in sensory misperception frequency  Outcome: Ongoing  Goal: Able to refrain from responding to false sensory perceptions  Description: Able to refrain from responding to false sensory perceptions  Outcome: Ongoing  Goal: Demonstrates accurate environmental perceptions  Description: Demonstrates accurate environmental perceptions  Outcome: Ongoing  Goal: Able to distinguish between reality-based and nonreality-based thinking  Description: Able to distinguish between reality-based and nonreality-based thinking  Outcome: Ongoing  Goal: Able to

## 2020-09-29 NOTE — PROGRESS NOTES
Occupational Therapy  Facility/Department: [facility] MED SURG  Daily Treatment Note  NAME: Tami Briceno  : 1945  MRN: [id number]    Date of Service: 2020    Discharge Recommendations:  ECF with OT, Continue to assess pending progress       Assessment      Safety Devices  Safety Devices in place: Yes  Type of devices: Call light within reach; Bed alarm in place         Patient Diagnosis(es): The primary encounter diagnosis was Acute CVA (cerebrovascular accident) ([facility] [address] [address] Diagnoses of Elevated troponin, Somnolence, Sepsis without acute organ dysfunction, due to unspecified organism [facility]), Altered mental status, unspecified altered mental status type, Essential hypertension, DM type 2, not at goal [facility]), and DNR (do not resuscitate) discussion were also pertinent to this visit. has a past medical history of BPH (benign prostatic hyperplasia), Cerebral artery occlusion with cerebral infarction ([facility] [address] Depression, [facility] Dorsalgia, Hypertension, Incontinence of feces, Lumbar radiculopathy, TIA (transient ischemic attack), Urinary retention, and UTI (urinary tract infection). has a past surgical history that includes Nerve Surgery (Left, 10/15/2019). Restrictions  Restrictions/Precautions  Restrictions/Precautions: General Precautions, Fall Risk  Subjective   General  Chart Reviewed: Yes  Patient assessed for rehabilitation services?: Yes  Family / Caregiver Present: No  Referring Practitioner: Dr. Alber Hassan  Diagnosis: Altered Mental Status  Subjective  Subjective: Pt lying in bed upon arrival. Pt required Max encouragement to participate in therapy session this date. Pt stated \"I would rather just sleep\". Pt agreed to in bed ther ex only.       Orientation     Objective                                                                Type of ROM/Therapeutic Exercise  Type of ROM/Therapeutic Exercise: AROM  Comment: Pt tolerated BUE ther ex with 1# dumbbell x 7 planes x 20 reps x 1 set to increase UE strength in order to ease completion of ADL tasks. Pt required RBs as needed secondary to fatigue. Plan   Plan  Times per week: 7  Times per day: Daily  Current Treatment Recommendations: Strengthening, Endurance Training, Neuromuscular Re-education, Self-Care / ADL, Safety Education & Training, Balance Training  G-Code     OutComes Score                                                  AM-PAC Score             Goals  Short term goals  Time Frame for Short term goals: 10 visits  Short term goal 1: Patient to participate in AROM/ther-ex exercises to increase strength/endurance for ADLs with moderate cuing. Short term goal 2: Patient to complete functional transfers with mod A of 2. Short term goal 3: Patient to complete ADL grooming tasks given setup and min A.        Therapy Time   Individual Concurrent Group Co-treatment   Time In 1314         Time Out 1337         Minutes 23                 WILLIAM Moore/JESSICA

## 2020-09-29 NOTE — PROGRESS NOTES
Didi Mccormack M.D. Internal Medicine Progress Note 9/29/20    SUBJECTIVE:    Patient seen for f/u of Altered mental status. He is doing better. Less agitation today. Denies any pain. Afebrile last 24 hours. ROS:   Constitutional: negative  for fevers, and negative for chills. Respiratory: negative for shortness of breath, negative for cough, and negative for wheezing  Cardiovascular: negative for chest pain, and negative for palpitations  Gastrointestinal: negative for abdominal pain, negative for nausea,negative for vomiting, negative for diarrhea, and negative for constipation     All other systems were reviewed with the patient and are negative unless otherwise stated in HPI    OBJECTIVE:  Vitals:   Temp: 98.7 °F (37.1 °C)  BP: (!) 156/63  Resp: 16  Pulse: 77  SpO2: 95 %    24HR INTAKE/OUTPUT:      Intake/Output Summary (Last 24 hours) at 9/29/2020 0825  Last data filed at 9/28/2020 0830  Gross per 24 hour   Intake 240 ml   Output --   Net 240 ml     -----------------------------------------------------------------  Exam:  GEN:    Awake, alert, no acute distress  EYES:  EOMI, pupils equal   NECK: Supple. No lymphadenopathy. No carotid bruit  CVS:    regular rate and rhythm, no audible murmur  PULM:  CTA, no wheezes, rales or rhonchi, no acute respiratory distress  ABD:    Bowels sounds normal.  Abdomen is soft. No distention. no tenderness to palpation. EXT:   no edema bilaterally . No calf tenderness. NEURO: Moves all extremities. Motor and sensory are grossly intact  SKIN:  No rashes.   No skin lesions.    -----------------------------------------------------------------  Diagnostic Data:    · All available data reviewed  Lab Results   Component Value Date    WBC 7.4 09/29/2020    HGB 7.3 (L) 09/29/2020    MCV 93.2 09/29/2020     09/29/2020      Lab Results   Component Value Date    GLUCOSE 137 (H) 09/29/2020    BUN 39 (H) 09/29/2020    CREATININE 2.52 (H) 09/29/2020     09/29/2020    K 3.6 (L) 09/29/2020    CALCIUM 8.2 (L) 09/29/2020     09/29/2020    CO2 19 (L) 09/29/2020       PROBLEM LIST:  Principal Problem:    Altered mental status  Active Problems:    Sepsis without acute organ dysfunction (HCC)    Somnolence    Elevated troponin    TIA (transient ischemic attack)    LISANDRA (acute kidney injury) ([address]    Anemia  Resolved Problems:    * No resolved hospital problems. *      ASSESSMENT / PLAN:  · AMS likely metabolic encephalopathy due to dehydration and LISANDRA superimposed on CKD  ? Improving   ? Continue gentle hydration IV  ? Fall Precautions  · Clarification: does NOT meet sepsis criteria this admission  ? Chest Xray -- Negative  ? BC -- No growth  ? UA -- No Infection  ? No ATB at this time  · Elevated Troponin secondary to CKD  ? Appreciate Dr. Casillas Loud input  · TIA  ? No reoccurance  · LISANDRA superimposed on CKD stage 3  ? Baseline creatinine 1.75 in June  ? Appreciate Nephrology - IVF's DC'd due to no improvement with IVF  · Acute anemia (uncertain cause) superimposed on chronic anemia of chronic kidney disease  ? [place] Free -- 8.56 elevated  ? Lambda -- 6.49 elevated  ? Awaiting [place]/Lambda Quant. ? No transfusion at this time.  Hgb 7.3 today  ? Consult Hem/Occ  · Discharge Plan:  Return to the [facility]  ?  Bert Smith M.D.  9/29/2020  8:25 AM

## 2020-09-29 NOTE — PROGRESS NOTES
Yohana Ragsdale am scribing for and in the presence of Heather Stern MD, F.A.C.C..    Patient: Bi Scruggs  : 1945  Date of Admission: 2020  Primary Care Physician: Mary Jo Renae  Today's Date: 2020    REASON FOR CONSULTATION: Altered Mental Status (increased confusion onset today. ); Extremity Weakness (new onset of right sided weakness today per NH staff); and Urinary Tract Infection (+nitrites and leucocytes in urine dip today. )      HPI: Mr. Phillip Alvarez is a 70 y.o. male with no prior cardiac history who was sent from the nursing home because of altered mental status, weakness and urinary tract infection. Cardiology consulted because of elevated troponin. Patient is drowsy and sleepy but responds to questions appropriately. He denies any prior history of heart disease. He knew that he has chronic kidney disease for the past several month. He has history of diabetes and hypertension. He is not aware of any family history of premature heart disease. He is lifelong non-smoker. He is staying at [facility]. He does snore but has never used a CPAP machine. Exercise Tolerance: Mr. Phillip Alvarez reports that he has a fairly good exercise tolerance. His says that he could walk around at [facility]. Goes to the bathroom and to the dining area. Patient is admitted to the hospital because of acute on chronic kidney disease, severe anemia and cardiology consulted because of elevated troponin. Ultrasound of the kidneys showed only simple renal cyst.  Nephrology on board. Initial work-up for multiple myeloma is positive. Oncology consulted. He is feeling okay today. Does not complain of any chest pain or shortness of breath. He still not willing or trying to get out of bed. His blood pressures uncontrolled over the past 24 hours. Heart rate is good enough for us to increase his carvedilol to 25 mg twice daily.     Past Medical History:   Diagnosis Date    BPH (benign prostatic hyperplasia)     Cerebral artery occlusion with [facility] infarction ([facility]     Depression     Diabetes mellitus ([facility]     Dorsalgia     Hypertension     Incontinence of feces     Lumbar radiculopathy     TIA (transient ischemic attack)     Urinary retention     UTI (urinary tract infection)        CURRENT ALLERGIES: Patient has no known allergies. REVIEW OF SYSTEMS: Limited review of system because patient is sleepy and drowsy but he was able to answer simple yes/no questions. Positive and negative are mentioned in HPI. Past Surgical History:   Procedure Laterality Date    NERVE SURGERY Left 10/15/2019    LUMBAR FACET-L4-L5, L5-SI performed by Yasir Hernandez MD at [address] History:  Social History     Tobacco Use    Smoking status: Never Smoker    Smokeless tobacco: Never Used   Substance Use Topics    Alcohol use: No    Drug use: No        CURRENT MEDICATIONS:  Outpatient Medications Marked as Taking for the 9/24/20 encounter [facility] Encounter)   Medication Sig Dispense Refill    losartan (COZAAR) 100 MG tablet Take 100 mg by mouth daily      NONFORMULARY Take by mouth daily hyzaar 100-25mg oral      loperamide (IMODIUM) 2 MG capsule Take 2 mg by mouth 4 times daily as needed for Diarrhea      insulin aspart (NOVOLOG FLEXPEN) 100 UNIT/ML injection pen Inject into the skin 4 times daily Sliding scale      amLODIPine (NORVASC) 10 MG tablet Take 10 mg by mouth daily      ammonium lactate (AMLACTIN) 12 % cream Apply topically nightly Apply topically as needed.       hydrALAZINE (APRESOLINE) 25 MG tablet Take 50 mg by mouth 3 times daily       glimepiride (AMARYL) 2 MG tablet Take 2 mg by mouth every morning (before breakfast)       insulin glargine (LANTUS) 100 UNIT/ML injection vial Inject 15 Units into the skin nightly       magnesium hydroxide (MILK OF MAGNESIA) 400 MG/5ML suspension Take 30 mLs by mouth      polyethylene glycol (GLYCOLAX) packet Take 17 g by mouth  sertraline (ZOLOFT) 100 MG tablet Take 200 mg by mouth daily       gabapentin (NEURONTIN) 300 MG capsule Take 300 mg by mouth 3 times daily.  tamsulosin (FLOMAX) 0.4 MG capsule Take 1 capsule by mouth daily 30 capsule 3    verapamil (CALAN SR) 240 MG extended release tablet Take 240 mg by mouth nightly      docusate sodium (COLACE) 100 MG capsule Take 100 mg by mouth daily       metFORMIN (GLUCOPHAGE) 500 MG tablet Take 500 mg by mouth 2 times daily (with meals)         carvedilol (COREG) tablet 6.25 mg, BID WC  hydrALAZINE (APRESOLINE) tablet 100 mg, 3 times per day  haloperidol (HALDOL) tablet 0.5 mg, Q6H PRN  gabapentin (NEURONTIN) capsule 300 mg, TID  glimepiride (AMARYL) tablet 2 mg, BID WC  sertraline (ZOLOFT) tablet 200 mg, Daily  tamsulosin (FLOMAX) capsule 0.4 mg, Daily  amLODIPine (NORVASC) tablet 10 mg, Daily  insulin lispro (HUMALOG) injection vial 4-10 Units, TID AC  insulin glargine (LANTUS) injection vial 15 Units, Nightly  sodium chloride flush 0.9 % injection 10 mL, 2 times per day  sodium chloride flush 0.9 % injection 10 mL, PRN  acetaminophen (TYLENOL) tablet 650 mg, Q6H PRN    Or  acetaminophen (TYLENOL) suppository 650 mg, Q6H PRN  polyethylene glycol (GLYCOLAX) packet 17 g, Daily PRN  promethazine (PHENERGAN) tablet 12.5 mg, Q6H PRN    Or  ondansetron (ZOFRAN) injection 4 mg, Q6H PRN  enoxaparin (LOVENOX) injection 40 mg, Daily  clopidogrel (PLAVIX) tablet 75 mg, Daily  glucose (GLUTOSE) 40 % oral gel 15 g, PRN  dextrose 50 % IV solution, PRN  glucagon (rDNA) injection 1 mg, PRN  dextrose 5 % solution, PRN         FAMILY HISTORY: No family history of premature CAD. PHYSICAL EXAM:   BP (!) 156/63   Pulse 77   Temp 98.7 °F (37.1 °C) (Temporal)   Resp 16   Ht 6' 1\" (1.854 m)   Wt 232 lb 12.9 oz (105.6 kg)   SpO2 95%   BMI 30.71 kg/m²  Body mass index is 30.71 kg/m².     Constitutional: He is sleepy but  arousable and is able to answer questions appropriately  HEENT: Pale, no JVD, no carotid bruit. No thyromegaly or lymphadenopathy  Cardiovascular: Normal rate, regular rhythm, normal heart sounds. Exam reveals no gallop and no friction rubs. No heart murmur heard. Pulmonary/Chest: Effort normal and breath sounds normal. No respiratory distress. He has no wheezes, rhonchi or rales. Abdominal: Soft, non-tender. Bowel sounds and aorta are normal. He exhibits no organomegaly, mass or bruit. Extremities: No edema. No cyanosis and no clubbing. Pulses are 2+ radial and carotid pulses. 2+ dorsalis pedis and posterior tibial pulses bilaterally. Skin: Skin is warm and dry. There is no rash or diaphoresis. Psychiatric: He has a normal mood and affect.  His speech is normal and behavior is normal.      MOST RECENT LABS ON RECORD:   Lab Results   Component Value Date    WBC 7.4 09/29/2020    HGB 7.3 (L) 09/29/2020    HCT 23.3 (L) 09/29/2020     09/29/2020    CHOL 121 09/25/2020    TRIG 90 09/25/2020    HDL 36 (L) 09/25/2020    LDLCHOLESTEROL 67 09/25/2020    ALT 6 09/25/2020    AST 8 09/25/2020     09/29/2020    K 3.6 (L) 09/29/2020     09/29/2020    CREATININE 2.52 (H) 09/29/2020    BUN 39 (H) 09/29/2020    CO2 19 (L) 09/29/2020    TSH 1.45 10/25/2019    PSA 4.21 (H) 03/30/2020    INR 1.0 09/07/2019    LABA1C 7.4 (H) 12/23/2019        ASSESSMENT:  Patient Active Problem List    Diagnosis Date Noted    Anemia 09/28/2020    Altered mental status 09/25/2020    L[facility]ANDRA (acute kidney injury) ([facility] 09/25/2020    Sepsis without acute organ dysfunction (HCC) 09/24/2020    Somnolence 09/24/2020    Elevated troponin 09/24/2020    TIA (transient ischemic attack) 09/24/2020    BPH with obstruction/lower urinary tract symptoms 10/16/2019    Neurogenic bladder 10/16/2019    Other constipation 10/16/2019    Spondylosis of lumbosacral region without myelopathy or radiculopathy 10/15/2019    Urinary retention 09/07/2019    Fecal incontinence 09/07/2019    H/O: MELIZA (cerebrovascular accident) 09/06/2019    Lumbar radiculopathy 09/05/2019    Hypertension     Diabetes mellitus ([facility]     Intractable back pain 09/04/2019       PLAN:  · Elevated troponin:   · Elevated troponin secondary to chronic kidney disease. · No chest pain, pressure or tightness. · No significant shortness of breath. · ECGs reviewed, no acute ischemic changes. Medical management of presumed CAD  Antiplatelet Agent: Continue Plavix for now. Beta Blocker: Increase carvedilol to 12.5 milligrams twice daily because of uncontrolled hypertension. Anti-anginal medications: Continue amlodipine (Norvasc) 10 mg once daily. Nephrology on board, work-up for multiple myeloma in progress. · Acute renal failure:  · Anemia  · Initial blood work suggestive of multiple myeloma. · Nephrology on board. · Continue management as per Dr. Melquiades Escobedo and our nephrology consultant team.        Essential Hypertension: Uncontrolled  · Beta Blocker: INCREASE to Carvedilol (Coreg) 12.5 mg twice daily. I also discussed the potential side effects of this medication including lightheadedness and dizziness and instructed them to stop the medication of this occurs and call our office if this occurs. · Continue hydralazine 100 mg 3 times daily. · Calcium Channel Blocker: Continue amlodipine (Norvasc) 10 mg once daily. · Continue following up the blood pressure as per unit protocol. Once again, thank you for allowing me to participate in this patients care. Please do not hesitate to contact me could I be of further assistance. Sincerely,  Chelsi Hampton MD, F.A.C.C. [facility] Cardiology Specialist    [address] [facility], [address] [place] [address]  Phone: [phone], Fax: [phone]     I believe that the risk of significant morbidity and mortality related to the patient's current medical conditions are: high.       The documentation recorded by the scribe, accurately and completely reflects the services I personally performed and the decisions made by me. Charles Willis MD, F.A.C.C.  September 29, 2020

## 2020-09-29 NOTE — PROGRESS NOTES
[facility]  Inpatient/Observation/Outpatient Rehabilitation    Date: 2020  Patient Name: Samantha Ham       [x] Inpatient Acute/Observation       []  Outpatient  : 1945       [] Pt no showed for scheduled appointment    [x] Pt refused/declined therapy at this time due to:           [] Pt cancelled due to:  [] No Reason Given   [] Sick/ill   [] Other:    Patient not seen for physical therapy at 3:45pm d/t refusal. Multiple attempts and approaches without success. Pt confused, agitation increases with persistence to participate.  Informed ABBY Garcia, PTA Date: 2020

## 2020-09-29 NOTE — PROGRESS NOTES
Patient agreeable to taking morning medication at this time but his Lovenox, again stating \"I'm not being poked right now. \" Pt brief changed and bed sheet changed as well. Large stool and large amount of urine noted in brief. Will continue to monitor.

## 2020-09-29 NOTE — PROGRESS NOTES
Writer was told patient took his meds fine all day. Writer had many unsuccessful attempts at trying to get patient to take night time meds. Writer tried putting them in pudding and patient still refused to take a bite.

## 2020-09-29 NOTE — PROGRESS NOTES
Pt assessed and vitals obtained at this time. Pt is currently agreeable to vitals and assessment. When orientation question are asked, pt only responds with his name and , not answering any other questions. FSBS attempted to be obtained but patient refused stating \"I've already been poked enough today you're not poking me again. \" Blood glucose this morning at 0630 was 137. No insulin required for that BS. Pt agreeable to taking medications at this time as well. Will continue to monitor.

## 2020-09-29 NOTE — PLAN OF CARE
Problem: Pain:  Goal: Pain level will decrease  Description: Pain level will decrease  9/29/2020 1036 by Paradise Tomas RN  Outcome: Ongoing     Problem: Pain:  Goal: Control of acute pain  Description: Control of acute pain  9/29/2020 1036 by Paradise Tomas RN  Outcome: Ongoing     Problem: Pain:  Goal: Control of chronic pain  Description: Control of chronic pain  9/29/2020 1036 by Paradise Tomas RN  Outcome: Ongoing     Problem: Falls - Risk of:  Goal: Will remain free from falls  Description: Will remain free from falls  9/29/2020 1036 by Paradise Tomas RN  Outcome: Ongoing     Problem: Falls - Risk of:  Goal: Absence of physical injury  Description: Absence of physical injury  9/29/2020 1036 by Paradise Tomas RN  Outcome: Ongoing     Problem: Skin Integrity:  Goal: Will show no infection signs and symptoms  Description: Will show no infection signs and symptoms  9/29/2020 1036 by Paradise Tomas RN  Outcome: Ongoing     Problem: Skin Integrity:  Goal: Absence of new skin breakdown  Description: Absence of new skin breakdown  9/29/2020 1036 by Paradise Tomas RN  Outcome: Ongoing     Problem: Suicide risk  Goal: Provide patient with safe environment  Description: Provide patient with safe environment  9/29/2020 1036 by Paradise Tomas RN  Outcome: Ongoing     Problem: Confusion - Acute:  Goal: Absence of continued neurological deterioration signs and symptoms  Description: Absence of continued neurological deterioration signs and symptoms  9/29/2020 1036 by Paradise Tomas RN  Outcome: Ongoing     Problem: Confusion - Acute:  Goal: Mental status will be restored to baseline  Description: Mental status will be restored to baseline  9/29/2020 1036 by Paradise Tomas RN  Outcome: Ongoing     Problem: Discharge Planning:  Goal: Ability to perform activities of daily living will improve  Description: Ability to perform activities of daily living will improve  9/29/2020 1036 by Felton Siddiqui ABBY Durbin  Outcome: Ongoing     Problem: Discharge Planning:  Goal: Participates in care planning  Description: Participates in care planning  9/29/2020 1036 by Gwen Suarez RN  Outcome: Ongoing     Problem: Injury - Risk of, Physical Injury:  Goal: Will remain free from falls  Description: Will remain free from falls  9/29/2020 1036 by Gwen Suarez RN  Outcome: Ongoing     Problem: Injury - Risk of, Physical Injury:  Goal: Absence of physical injury  Description: Absence of physical injury  9/29/2020 1036 by Gwen Suarez RN  Outcome: Ongoing     Problem: Mood - Altered:  Goal: Mood stable  Description: Mood stable  9/29/2020 1036 by Gwen Suarez RN  Outcome: Ongoing     Problem: Mood - Altered:  Goal: Absence of abusive behavior  Description: Absence of abusive behavior  9/29/2020 1036 by Gwen Suarez RN  Outcome: Ongoing

## 2020-09-30 NOTE — PROGRESS NOTES
[facility]  OCCUPATIONAL THERAPY  No Visit Note    [] ICU    [x] Acute   Patient: Joseph Valente  Room: 5792/4022-43      Joseph Valente not seen on 9/30/2020 at 9:46 AM due to Per Corey Nurse, RN, pt hemoglobin at 6.8 and will be getting 2 units of blood, but to check and see if pt is willing to complete exercises in bed. Pt looked very fatigued and was pleasant but stated \"I'm not going to do anything today. \" Pt states being very tired and waiting for blood. Told pt that therapy may be back to check on him later today, to which he was agreeable, but he just wants to go back to the [facility]. Corey Nurse, RN aware.  .          Signature: ZEYAD Mcdaniels, OTR/L

## 2020-09-30 NOTE — PLAN OF CARE
Problem: Pain:  Goal: Pain level will decrease  Description: Pain level will decrease  Outcome: Ongoing  Note: Assess pain every 4 hours and prn using a 0-10 scale. Teach patient adverse complication of uncontrolled pain. Plan patients day so that aggravating activities coincide with peak of analgesic. Patients should be medicated before procedures and activities that incite pain to prevent spikes in pain. Provide patient with distractions of choice such as TV, music or reading. Discuss with patient what a tolerable pain rating would be and work towards that and not just eliminating all pain. Goal: Control of acute pain  Description: Control of acute pain  Outcome: Ongoing  Note: Patient denies having any pain at this time. Problem: Falls - Risk of:  Goal: Will remain free from falls  Description: Will remain free from falls  Outcome: Ongoing  Note: Fall assessment completed daily. Bed in lowest position. Call light within reach. Falling star posted to outside of door. Fall risk sticker in place on ID band. Side rails up 2/4. Bed alarm on for safety. Patient ambulates with a strong 2 assist.  Will continue to monitor. Goal: Absence of physical injury  Description: Absence of physical injury  Outcome: Ongoing  Note: Patient free from physical injury at this time     Problem: Skin Integrity:  Goal: Will show no infection signs and symptoms  Description: Will show no infection signs and symptoms  Outcome: Ongoing  Note: Patient has redness on his scrotum and buttocks. Zinc paste applied during every briefs change. Vitals WNL. Goal: Absence of new skin breakdown  Description: Absence of new skin breakdown  Outcome: Ongoing  Note: Skin assessment performed, no new breakdown noted at this time. Patient skin is dry and intact. Will continue to monitor for redness or breakdown.        Problem: Confusion - Acute:  Goal: Absence of continued neurological deterioration signs and symptoms  Description: Absence of

## 2020-09-30 NOTE — FLOWSHEET NOTE
Vitals checked and assessment done. No c/o at present time . Continues oriented to name only.  Bed alarm on for safety, call light within reach

## 2020-09-30 NOTE — FLOWSHEET NOTE
[facility] called to remind oncologist of consult.  Valeria Zamudio at [facility] states he will see patient at clinic first and may not see patient until 7 pm

## 2020-09-30 NOTE — PROGRESS NOTES
Physical Therapy  Facility/Department: Critical access hospital AT THE [facility] MED SURG  Daily Treatment Note  NAME: Fariha Jose  : 1945  MRN: [id number]    Date of Service: 2020    Discharge Recommendations:  ECF with PT, ECF without PT, Continue to assess pending progress        Assessment      Activity Tolerance  Activity Tolerance: Patient limited by fatigue;Patient limited by cognitive status; Patient limited by pain     Patient Diagnosis(es): The primary encounter diagnosis was Acute CVA (cerebrovascular accident) ([facility] [address] Diagnoses of Elevated troponin, Somnolence, Sepsis without acute organ dysfunction, due to unspecified organism [facility]), Altered mental status, unspecified altered mental status type, Essential hypertension, DM type 2, not at goal [facility]), and DNR (do not resuscitate) discussion were also pertinent to this visit. has a past medical history of BPH (benign prostatic hyperplasia), Cerebral artery occlusion with cerebral infarction ([facility] Depression, Diabetes mellitus ([facility] Dorsalgia, Hypertension, Incontinence of feces, Lumbar radiculopathy, TIA (transient ischemic attack), Urinary retention, and UTI (urinary tract infection). has a past surgical history that includes Nerve Surgery (Left, 10/15/2019). Restrictions  Restrictions/Precautions  Restrictions/Precautions: General Precautions, Fall Risk  Subjective   General  Chart Reviewed: Yes  Family / Caregiver Present: Yes  Referring Practitioner: Anitra Verma MD  Subjective  Subjective: Patient reports L LE pain but unable to rate at this time; it is worse with movement. Orientation  Orientation  Orientation Level: Oriented to person  Cognition      Objective   Bed mobility  Comment: Pt refused transfers due to fatigue, pain and not feeling well.   Transfers  Comment: Pt refused  Ambulation  Ambulation?: No        Exercises  Straight Leg Raise: 2x10  Quad Sets: 2x10  Heelslides: 2x10  Hip Abduction: 2x10  Knee Short Arc Quad: 2x10  Ankle Pumps:

## 2020-09-30 NOTE — PLAN OF CARE
Problem: Pain:  Goal: Pain level will decrease  Outcome: Ongoing  Goal: Control of acute pain  Outcome: Ongoing  Goal: Control of chronic pain  Outcome: Ongoing     Problem: Falls - Risk of:  Goal: Will remain free from falls  Outcome: Ongoing  Goal: Absence of physical injury  Outcome: Ongoing     Problem: Skin Integrity:  Goal: Will show no infection signs and symptoms  Outcome: Ongoing  Goal: Absence of new skin breakdown  Outcome: Ongoing     Problem: Suicide risk  Goal: Provide patient with safe environment  Outcome: Ongoing     Problem: Confusion - Acute:  Goal: Absence of continued neurological deterioration signs and symptoms  Outcome: Ongoing  Goal: Mental status will be restored to baseline  Outcome: Ongoing     Problem: Discharge Planning:  Goal: Ability to perform activities of daily living will improve  Outcome: Ongoing  Goal: Participates in care planning  Outcome: Ongoing     Problem: Injury - Risk of, Physical Injury:  Goal: Will remain free from falls  Outcome: Ongoing  Goal: Absence of physical injury  Outcome: Ongoing     Problem: Mood - Altered:  Goal: Mood stable  Outcome: Ongoing  Goal: Absence of abusive behavior  Outcome: Ongoing  Goal: Verbalizations of feeling emotionally comfortable while being cared for will increase  Outcome: Ongoing     Problem: Psychomotor Activity - Altered:  Goal: Absence of psychomotor disturbance signs and symptoms  Outcome: Ongoing     Problem: Sensory Perception - Impaired:  Goal: Demonstrations of improved sensory functioning will increase  Outcome: Ongoing  Goal: Decrease in sensory misperception frequency  Outcome: Ongoing  Goal: Able to refrain from responding to false sensory perceptions  Outcome: Ongoing  Goal: Demonstrates accurate environmental perceptions  Outcome: Ongoing  Goal: Able to distinguish between reality-based and nonreality-based thinking  Outcome: Ongoing  Goal: Able to interrupt nonreality-based thinking  Outcome: Ongoing     Problem: Sleep Pattern Disturbance:  Goal: Appears well-rested  Outcome: Ongoing     Problem: Nutrition  Goal: Optimal nutrition therapy  Outcome: Ongoing     Problem: Musculor/Skeletal Functional Status  Goal: Highest potential functional level  Outcome: Ongoing  Goal: Absence of falls  Outcome: Ongoing

## 2020-09-30 NOTE — PROGRESS NOTES
[facility]  Physical Therapy    Date: 2020  Patient Name: Pedro Boo        : 1945       [x] Pt Refusal Pt refused stating\"maybe later\".            [] Pt Unavailable due to:          Nila Bloch, [id number] Date: 2020

## 2020-09-30 NOTE — PROGRESS NOTES
[facility]  Inpatient/Observation/Outpatient Rehabilitation    Date: 2020  Patient Name: Maya Eldridge       [x] Inpatient Acute/Observation       []  Outpatient  : 1945       [] Pt no showed for scheduled appointment    [x] Pt refused/declined therapy at this time due to:  Pt had procedure done this date and reports fatigue. Pt edu on importance of BUE therex and encouraged to complete AROM this evening in the least. \"Its not gonna be today\". [] Pt cancelled due to:  [] No Reason Given   [] Sick/ill   [] Other:    Will attempt evaluation at our earliest opportunity.        Candida CHESTER Date: 2020

## 2020-10-01 NOTE — PROGRESS NOTES
Elaine Abrams M.D. Internal Medicine Progress Note 10/1/20    SUBJECTIVE:    Patient seen for f/u of Altered mental status. He is feeling well this AM.  Denies any complaints. Denies fever or chills. Denies chest pain, palpitations, SOB or cough. ROS:   Constitutional: negative  for fevers, and negative for chills. Respiratory: negative for shortness of breath, negative for cough, and negative for wheezing  Cardiovascular: negative for chest pain, and negative for palpitations  Gastrointestinal: negative for abdominal pain, negative for nausea,negative for vomiting, negative for diarrhea, and negative for constipation     All other systems were reviewed with the patient and are negative unless otherwise stated in HPI    OBJECTIVE:  Vitals:   Temp: 98.7 °F (37.1 °C)  BP: 138/61  Resp: 18  Pulse: 60  SpO2: 92 %    24HR INTAKE/OUTPUT:      Intake/Output Summary (Last 24 hours) at 10/1/2020 0742  Last data filed at 10/1/2020 0359  Gross per 24 hour   Intake 2915.75 ml   Output --   Net 2915.75 ml     -----------------------------------------------------------------  Exam:  GEN:    Awake, alert and oriented x3. EYES:  EOMI, pupils equal   NECK: Supple. No lymphadenopathy. No carotid bruit  CVS:    regular rate and rhythm, no audible murmur  PULM:  CTA, no wheezes, rales or rhonchi, no acute respiratory distress  ABD:    Bowels sounds normal.  Abdomen is soft. No distention. no tenderness to palpation. EXT:   no edema bilaterally . No calf tenderness. NEURO: Moves all extremities. Motor and sensory are grossly intact  SKIN:  No rashes.   No skin lesions.    -----------------------------------------------------------------  Diagnostic Data:    · All available data reviewed  Lab Results   Component Value Date    WBC 6.6 10/01/2020    HGB 8.2 (L) 10/01/2020    MCV 93.0 10/01/2020     10/01/2020      Lab Results   Component Value Date    GLUCOSE 115 (H) 10/01/2020    BUN 56 (H) 10/01/2020 CREATININE 3.12 (H) 10/01/2020     10/01/2020    K 3.6 (L) 10/01/2020    CALCIUM 8.1 (L) 10/01/2020     10/01/2020    CO2 20 10/01/2020         ASSESSMENT / PLAN:  · AMS likely metabolic encephalopathy on top of underlying dementia  ? Improving   · Elevated Troponin secondary to CKD  ? Appreciate Dr. Marielle Bowden input  ? Continue Plavix, Carvedilol and Amlodipine  · LISANDRA superimposed on CKD stage 3  ? Appreciate Nephrology input  ? May be at his new baseline per nephrology  ? Monitor renal function  ? F/u with Nephrology as outpatient  · Acute iron deficiency anemia superimposed on chronic anemia of chronic kidney disease  ? Appreciate Hematology input  ? S/p PRBC transfusion yesterday: Hb 6.8 --> 8.3  ? IV iron infusion  ? Will f/u with hematology as outpatient  ? Pt declines colonoscopy / EGD  · Hypertension  ? Continue Amlodipine, Hydralazine  ? Coreg dose increased by Dr. Marielle Bowden  · Discharge Plan: Thea Daly to the [facility]  ?  Cliff Henning M.D.  10/1/2020  7:42 AM

## 2020-10-01 NOTE — PROGRESS NOTES
[facility]  Inpatient/Observation/Outpatient Rehabilitation    Date: 10/1/2020  Patient Name: Chris Renae       [x] Inpatient Acute/Observation       []  Outpatient  : 1945       [] Pt no showed for scheduled appointment    [x] Pt refused/declined therapy at this time due to:    Pt refused to complete any physical therapy. Stated \"I just don't want to get out of bed or do any exercises. \" Educated pt at length on the importance of participating in physical therapy but pt continued to refuse.         [] Pt cancelled due to:  [] No Reason Given   [] Sick/ill   [] Other:        Johnathan Lacey, PTA Date: 10/1/2020 72

## 2020-10-01 NOTE — PROGRESS NOTES
Patient had legs out of bed attempting to climb out. When writer tried to re-orient him and coax him to put his legs back in bed, he refused. When writer grabbed his legs and tried to put them back into bed, he became combative, verbally abusive and was kicking at writers arm. Haldol administered for agitation.

## 2020-10-01 NOTE — DISCHARGE SUMMARY
Discharge Summary    Bassam Quezada  :  1945  MRN:  [id number]    Admit date:  2020      Discharge date: 10/1/2020     Admitting Physician:  Andrey Burgess MD    Discharge Diagnoses:    Principal Problem:    Altered mental status  Active Problems:    Sepsis without acute organ dysfunction (HCC)    Somnolence    Elevated troponin    TIA (transient ischemic attack)    LISANDRA (acute kidney injury) ([facility]    Normocytic anemia    Acute CVA (cerebrovascular accident) ([facility]    Chronic renal impairment, stage 4 (severe) ([facility]  Resolved Problems:    * No resolved hospital problems. *      Hospital Course:   Bassam Quezada is a 70 y.o. male admitted with AMS. He presented to the ER from the [facility] with right sided weakness and AMS. He was transferred to the ER and upon evaluation and preparation to intubate, he woke up and declined intubation. He was admitted and Cardiology, Nephrology, and Hematology were consulted. At the [facility] he was able to ambulate around, although he has chronic back pain for years. His Right Sided Weakness has resolved and his mentation has improved and he is having less agitaiton. His elevated renal function is improved and medications have been adjusted. He was found to be anemic and there were concerns for multiple myeloma, however Hematology has explained that his anemia is due to [facility] which will cause the elevation in the Kappra at 8.5 and Lambda at 6.1, however his ratio was normal.  He is ordered to received 3 iron infusions with the last one to be done as an outpatient. He did receive 1 unit of PRBCs for Hgb of 6.8. Sepsis was also ruled out. I will discharge him to the [facility] today. He will continue on oxygen to maintain his SPO2 > 92%. He will have labs on Monday and follow up with Cardiology, Nephrology, Hematology, and his PCP.      Consultants:  Dr. Ute Olivera, heme, onc ; Dr. Ingrid Nolasco, Nephrology; Dr. Leelee Cummins, Cardiology    Procedures: none    Complications: none    Discharge Condition: good    Exam:  GEN:  alert and oriented to person, place and time, well-developed and well-nourished, in no acute distress  EYES: No gross abnormalities. , PERRL and EOMI  NECK: normal, supple, no lymphadenopathy,  no carotid bruits  PULM: clear to auscultation bilaterally- no wheezes, rales or rhonchi, normal air movement, no respiratory distress  COR: regular rate & rhythm, no murmurs, no gallops, S1 normal and S2 normal  ABD:  soft, non-tender, non-distended, normal bowel sounds, no masses or organomegaly  EXT:   no cyanosis, clubbing or edema present    NEURO: follows commands, PURVIS, no deficits  SKIN:  no rashes or significant lesions    Significant Diagnostic Studies:   Lab Results   Component Value Date    WBC 6.6 10/01/2020    HGB 8.2 (L) 10/01/2020     10/01/2020       Lab Results   Component Value Date    BUN 56 (H) 10/01/2020    CREATININE 3.12 (H) 10/01/2020     10/01/2020    K 3.6 (L) 10/01/2020    CALCIUM 8.1 (L) 10/01/2020     10/01/2020    CO2 20 10/01/2020    LABGLOM 20 (L) 10/01/2020       Lab Results   Component Value Date    WBCUA 2 TO 5 09/24/2020    RBCUA 0 TO 2 09/24/2020    EPITHUA 2 TO 5 09/24/2020    LEUKOCYTESUR NEGATIVE 09/24/2020    SPECGRAV 1.025 (H) 09/24/2020    GLUCOSEU TRACE (A) 09/24/2020    KETUA NEGATIVE 09/24/2020    PROTEINU 4+ (A) 09/24/2020    HGBUR TRACE (A) 09/24/2020    CASTUA NOT REPORTED 09/24/2020    CRYSTUA NOT REPORTED 09/24/2020    BACTERIA TRACE (A) 09/24/2020    YEAST NOT REPORTED 09/24/2020       Us Retroperitoneal Limited    Result Date: 9/25/2020  EXAMINATION: ULTRASOUND OF THE KIDNEYS 9/25/2020 2:10 pm COMPARISON: CT abdomen and pelvis of 11 June 2020 HISTORY: ORDERING SYSTEM PROVIDED HISTORY: Acute on chronic renal failure TECHNOLOGIST PROVIDED HISTORY: Acute on chronic renal failure FINDINGS: The right kidney measures 12.33 x 5.62 x 4.77 cm. The left kidney measures 11.5 x 4.85 x 5.17 cm.  Visualization of the left kidney is limited due to bowel gas and body habitus. A cyst in the lateral mid pole of the right kidney of 6.22 x 6.12 x 6.2 cm is noted. No hydronephrosis or nephrolithiasis is demonstrated. No cortical thinning is demonstrated. Right renal cyst.  Otherwise unremarkable ultrasound appearance of the kidneys. Assessment and Plan:  Patient Active Problem List    Diagnosis Date Noted    Acute CVA (cerebrovascular accident) ([address]     Chronic renal impairment, stage 4 (severe) ([address]     Normocytic anemia 09/28/2020    Altered mental status 09/25/2020    LISANDRA (acute kidney injury) ([address] 09/25/2020    Sepsis without acute organ dysfunction ([address] 09/24/2020    Somnolence 09/24/2020    Elevated troponin 09/24/2020    TIA (transient ischemic attack) 09/24/2020    BPH with obstruction/lower urinary tract symptoms 10/16/2019    Neurogenic bladder 10/16/2019    Other constipation 10/16/2019    Spondylosis of lumbosacral region without myelopathy or radiculopathy 10/15/2019    Urinary retention 09/07/2019    Fecal incontinence 09/07/2019    H/O: CVA (cerebrovascular accident) 09/06/2019    Lumbar radiculopathy 09/05/2019    Hypertension     Diabetes mellitus ([address]     Intractable back pain 09/04/2019        Discharge Medications:       Narciso Crawford   [facility] Medication Instructions [id number]    Printed on:10/01/20 4706   Medication Information                      acetaminophen (TYLENOL) 500 MG tablet  Take 1,000 mg by mouth 3 times daily as needed for Pain             amLODIPine (NORVASC) 10 MG tablet  Take 10 mg by mouth daily             ammonium lactate (AMLACTIN) 12 % cream  Apply topically nightly Apply topically as needed.              bisacodyl (DULCOLAX) 10 MG suppository  Place 10 mg rectally daily as needed for Constipation             carvedilol (COREG) 12.5 MG tablet  Take 1 tablet by mouth 2 times daily (with meals)             clopidogrel (PLAVIX) 75 MG tablet  Take 1 tablet by mouth daily             docusate sodium (COLACE) 100 MG capsule  Take 100 mg by mouth daily              gabapentin (NEURONTIN) 300 MG capsule  Take 300 mg by mouth 3 times daily. glimepiride (AMARYL) 2 MG tablet  Take 1 tablet by mouth 2 times daily (with meals)             glucagon, rDNA, 1 MG injection  Inject 1 mg into the muscle             glucose (GLUTOSE) 40 % GEL  Take 15 g by mouth as needed              hydrALAZINE (APRESOLINE) 100 MG tablet  Take 1 tablet by mouth every 8 hours             insulin aspart (NOVOLOG FLEXPEN) 100 UNIT/ML injection pen  Inject into the skin 4 times daily Sliding scale             insulin glargine (LANTUS) 100 UNIT/ML injection vial  Inject 15 Units into the skin nightly              iron sucrose (VENOFER) 20 MG/ML injection  Infuse 10 mLs intravenously daily for 1 dose 200 mg in 100 ml of 0.9% NS and Infuse             loperamide (IMODIUM) 2 MG capsule  Take 2 mg by mouth 4 times daily as needed for Diarrhea             magnesium hydroxide (MILK OF MAGNESIA) 400 MG/5ML suspension  Take 30 mLs by mouth             NONFORMULARY  Take by mouth daily hyzaar 100-25mg oral             polyethylene glycol (GLYCOLAX) packet  Take 17 g by mouth             sertraline (ZOLOFT) 100 MG tablet  Take 200 mg by mouth daily              sodium bicarbonate 650 MG tablet  Take 1 tablet by mouth 2 times daily             Sodium Phosphates (FLEET) 7-19 GM/118ML  Place 1 enema rectally once as needed             tamsulosin (FLOMAX) 0.4 MG capsule  Take 1 capsule by mouth daily                 Patient Instructions:    Activity: activity as tolerated  Diet: cardiac diet and renal diet  Wound Care: none needed  Other: Labs on Monday -- BMP, CBC w/ diff, Iron Studies     Disposition:   DC to [facility]    Follow up:  Patient will be followed by Kalli Valentin MD in 1-2 weeks    CORE MEASURES on Discharge (if applicable)  ACE/ARB in CHF: NA  Statin in MI: NA  ASA in MI: NA  Statin in CVA: NA  Antiplatelet in CVA: NA    Total time spent on discharge services: 45 minutes    Including the following activities:  Evaluation and Management of patient  Discussion with patient and/or surrogate about current care plan  Coordination with Case Management and/or   Coordination of care with Consultants (if applicable)   Coordination of care with Receiving Facility Physician (if applicable)  Completion of DME forms (if applicable)  Preparation of Discharge Summary  Preparation of Medication Reconciliation  Preparation of Discharge Prescriptions    Signed:  SEBLE Moon, NP-C  10/1/2020, 1:54 PM

## 2020-10-01 NOTE — PROGRESS NOTES
Physician Progress Note      Katt Riley  [facility] #:                  [id number]  :                       1945  ADMIT DATE:       2020 6:03 PM  [address] [place] [address] DATE:  RESPONDING  PROVIDER #:        Danisha Perez MD          QUERY TEXT:    Patient admitted 20 altered mental status with right sided weakness. Noted documentation in 20 Hematology/Oncology consult: \"Obviously patient   presents with acute CVA which has resolved\"    Also, Acute CVA is noted in   hospital problem list.    If possible, please document in progress notes and discharge summary if you   are evaluating and /or treating  acute CVA this admission    The medical record reflects the following:  Risk Factors: PMH CVA, DM, HTN  Clinical Indicators: per H&P: He was noticed to have rt sided weakness at   nursing home but has no weakness now;  20 CT Head: No acute intracranial   abnormality. Per Attending 20 attending  progress note: AMS likely   metabolic encephalopathy due to dehydration and LISANDRA superimposed on CKD  Treatment: Head CT, gentle hydration, fall precautions    Jimy Mariscal RN, [address]  Clinical   [phone]  . Options provided:  -- Acute CVA present on admission confirmed  -- Acute CVA ruled out this admission  -- Other - I will add my own diagnosis  -- Disagree - Not applicable / Not valid  -- Disagree - Clinically unable to determine / Unknown  -- Refer to Clinical Documentation Reviewer    PROVIDER RESPONSE TEXT:    After study, acute CVA ruled out this admission.     Query created by: Manasa Lopez on 10/1/2020 1:27 PM      Electronically signed by:  Danisah Perez MD 10/1/2020 1:32 PM

## 2020-10-01 NOTE — PROGRESS NOTES
[facility] Department of Pharmacy   Pharmacy Renal Adjustment Note    Fariha Jose is a 70 y.o. male. Pharmacist assessment of renally cleared medications. Recent Labs     09/29/20  0630 09/30/20  0645 10/01/20  0605   CREATININE 2.52* 3.09* 3.12*     Estimated Creatinine Clearance: 27 mL/min (A) (based on SCr of 3.12 mg/dL (H)). Height:   Ht Readings from Last 1 Encounters:   09/25/20 6' 1\" (1.854 m)     Weight:  Wt Readings from Last 1 Encounters:   10/01/20 242 lb 4.6 oz (109.9 kg)       The following medication has been adjusted based upon renal function:             Lovenox adjusted to 30 mg for Crcl < 30 ml/min.          Karla Hallman,10/1/2020,11:04 AM

## 2020-10-01 NOTE — PROGRESS NOTES
Patient is getting d/c'd to [facility], clarified Home O2 order with , patient does not need evaluated if going to rehab. Updated provider.

## 2020-10-01 NOTE — CONSULTS
_                         Today's Date: 9/30/2020  Patient Name: Maya Eldridge  Date of admission: 9/24/2020  6:03 PM  Patient's age: 70 y.o., 1945  Admission Dx: Acute CVA (cerebrovascular accident) [facility]) [I63.9]      Requesting Physician: Skip lElis MD    CHIEF COMPLAINT: Altered mental status. Acute CVA. Consult for anemia. History Obtained From:  patient, electronic medical record    HISTORY OF PRESENT ILLNESS:      The patient is a 70 y.o.  male who is admitted to the hospital for further management of acute CVA and altered mental status. Patient has multiple comorbidities as listed. He presented with right-sided weakness and altered mental status. Attempt to intubate him but he declined. He was in nursing home and was noted to have right-sided weakness. No fall or head trauma. No headaches. Weakness has resolved. Patient had generalized weakness and fatigue. Further work-up showed severe anemia and renal insufficiency. We have been asked to see him because of anemia. Patient has no active bleeding. No history of hematochezia. He had history of passage of black stool about 1 year ago. He declined colonoscopy. No fever. No chest pain. No shortness of breath. Patient quit smoking or alcohol drinking long time ago. Past Medical History:   has a past medical history of BPH (benign prostatic hyperplasia), Cerebral artery occlusion with cerebral infarction ([address] Depression, Diabetes mellitus ([address] Dorsalgia, Hypertension, Incontinence of feces, Lumbar radiculopathy, TIA (transient ischemic attack), Urinary retention, and UTI (urinary tract infection). Past Surgical History:   has a past surgical history that includes Nerve Surgery (Left, 10/15/2019). Family History: family history is not on file. Social History:   reports that he has never smoked.  He has never used smokeless tobacco. He reports that he does not drink alcohol or use drugs. Medications:    Prior to Admission medications    Medication Sig Start Date End Date Taking? Authorizing Provider   losartan (COZAAR) 100 MG tablet Take 100 mg by mouth daily   Yes Historical Provider, MD   NONFORMULARY Take by mouth daily hyzaar 100-25mg oral   Yes Historical Provider, MD   loperamide (IMODIUM) 2 MG capsule Take 2 mg by mouth 4 times daily as needed for Diarrhea   Yes Historical Provider, MD   insulin aspart (NOVOLOG FLEXPEN) 100 UNIT/ML injection pen Inject into the skin 4 times daily Sliding scale   Yes Historical Provider, MD   amLODIPine (NORVASC) 10 MG tablet Take 10 mg by mouth daily   Yes Historical Provider, MD   ammonium lactate (AMLACTIN) 12 % cream Apply topically nightly Apply topically as needed. Yes Historical Provider, MD   hydrALAZINE (APRESOLINE) 25 MG tablet Take 50 mg by mouth 3 times daily    Yes Historical Provider, MD   glimepiride (AMARYL) 2 MG tablet Take 2 mg by mouth every morning (before breakfast)  4/21/20  Yes Historical Provider, MD   insulin glargine (LANTUS) 100 UNIT/ML injection vial Inject 15 Units into the skin nightly    Yes Historical Provider, MD   magnesium hydroxide (MILK OF MAGNESIA) 400 MG/5ML suspension Take 30 mLs by mouth 9/4/19  Yes Historical Provider, MD   polyethylene glycol (GLYCOLAX) packet Take 17 g by mouth 9/7/19  Yes Historical Provider, MD   sertraline (ZOLOFT) 100 MG tablet Take 200 mg by mouth daily  10/3/19  Yes Historical Provider, MD   gabapentin (NEURONTIN) 300 MG capsule Take 300 mg by mouth 3 times daily.    Yes Historical Provider, MD   tamsulosin (FLOMAX) 0.4 MG capsule Take 1 capsule by mouth daily 9/12/19  Yes Kiesha Cotto MD   verapamil (CALAN SR) 240 MG extended release tablet Take 240 mg by mouth nightly   Yes Historical Provider, MD   docusate sodium (COLACE) 100 MG capsule Take 100 mg by mouth daily    Yes Historical Provider, MD   metFORMIN (GLUCOPHAGE) 500 MG tablet Take 500 mg by mouth 2 times daily (with meals)   Yes Historical Provider, MD   Sodium Phosphates (FLEET) 7-19 GM/118ML Place 1 enema rectally once as needed    Historical Provider, MD   glucose (GLUTOSE) 40 % GEL Take 15 g by mouth as needed  9/4/19   Historical Provider, MD   glucagon, rDNA, 1 MG injection Inject 1 mg into the muscle 9/4/19   Historical Provider, MD   bisacodyl (DULCOLAX) 10 MG suppository Place 10 mg rectally daily as needed for Constipation    Historical Provider, MD   acetaminophen (TYLENOL) 500 MG tablet Take 1,000 mg by mouth 3 times daily as needed for Pain    Historical Provider, MD     Current Facility-Administered Medications   Medication Dose Route Frequency Provider Last Rate Last Dose    carvedilol (COREG) tablet 12.5 mg  12.5 mg Oral BID  Jesenia Nelson MD   12.5 mg at 09/30/20 1728    sodium bicarbonate tablet 650 mg  650 mg Oral BID Reba Joshua DO   650 mg at 09/30/20 5730    hydrALAZINE (APRESOLINE) tablet 100 mg  100 mg Oral 3 times per day Jesenia Nelson MD   100 mg at 09/30/20 1728    haloperidol (HALDOL) tablet 0.5 mg  0.5 mg Oral Q6H PRN SEBLE Abreu - CNP   0.5 mg at 09/28/20 1308    gabapentin (NEURONTIN) capsule 300 mg  300 mg Oral TID Elo Bruce MD   300 mg at 09/30/20 1731    glimepiride (AMARYL) tablet 2 mg  2 mg Oral BID  Elo Bruce MD   2 mg at 09/30/20 1728    sertraline (ZOLOFT) tablet 200 mg  200 mg Oral Daily Elo Bruce MD   200 mg at 09/30/20 0966    tamsulosin (FLOMAX) capsule 0.4 mg  0.4 mg Oral Daily Elo Bruce MD   0.4 mg at 09/30/20 6054    amLODIPine (NORVASC) tablet 10 mg  10 mg Oral Daily Elo Bruce MD   10 mg at 09/30/20 2805    insulin lispro (HUMALOG) injection vial 4-10 Units  4-10 Units Subcutaneous TID [facility] Elo Bruce MD   6 Units at 09/30/20 1214    insulin glargine (LANTUS) injection vial 15 Units  15 Units Subcutaneous Nightly Elo Bruce MD   15 Units at 09/29/20 2025    sodium chloride flush 0.9 % injection 10 mL  10 mL Intravenous 2 times per day Viola Moctezuma MD   10 mL at 09/30/20 0833    sodium chloride flush 0.9 % injection 10 mL  10 mL Intravenous PRN Viola Moctezuma MD   10 mL at 09/30/20 1005    acetaminophen (TYLENOL) tablet 650 mg  650 mg Oral Q6H PRN Viola Moctezuma MD   650 mg at 09/30/20 1737    Or    acetaminophen (TYLENOL) suppository 650 mg  650 mg Rectal Q6H PRN Viola Moctezuma MD        polyethylene glycol [facility]) packet 17 g  17 g Oral Daily PRN Viola Moctezuma MD        promethazine (PHENERGAN) tablet 12.5 mg  12.5 mg Oral Q6H PRN Viola Moctezuma MD   12.5 mg at 09/25/20 0044    Or    ondansetron (ZOFRAN) injection 4 mg  4 mg Intravenous Q6H PRN Viola Moctezuma MD        enoxaparin (LOVENOX) injection 40 mg  40 mg Subcutaneous Daily Viola Moctezuma MD   40 mg at 09/30/20 0834    clopidogrel (PLAVIX) tablet 75 mg  75 mg Oral Daily Viola Moctezuma MD   75 mg at 09/30/20 0833    glucose (GLUTOSE) 40 % oral gel 15 g  15 g Oral PRN Brooke Nielson MD        dextrose 50 % IV solution  12.5 g Intravenous PRN Brooke Nielson MD        glucagon (rDNA) injection 1 mg  1 mg Intramuscular PRN Brooke Nielson MD        dextrose 5 % solution  100 mL/hr Intravenous PRN Brooke Nielson MD           Allergies:  Patient has no known allergies. REVIEW OF SYSTEMS:      · General: Positive for weakness and fatigue. No unanticipated weight loss or decreased appetite. No fever or chills. · Eyes: No blurred vision, eye pain or double vision. · Ears: No hearing problems or drainage. No tinnitus. · Throat: No sore throat, problems with swallowing or dysphagia. · Respiratory: No cough, sputum or hemoptysis. Positive for exertional shortness of breath. No pleuritic chest pain. · Cardiovascular: No chest pain, orthopnea or PND. No lower extremity edema. No palpitation. · Gastrointestinal: No problems with swallowing.  No abdominal pain or bloating. No nausea or vomiting. No diarrhea or constipation. No GI bleeding. · Genitourinary: No dysuria, hematuria, frequency or urgency. · Musculoskeletal: No muscle aches or pains. No limitation of movement. No back pain. No gait disturbance, No joint complaints. · Dermatologic: No skin rashes or pruritus. No skin lesions or discolorations. · Psychiatric: No depression, anxiety, or stress or signs of schizophrenia. No change in mood or affect. · Hematologic: No history of bleeding tendency. No bruises or ecchymosis. No history of clotting problems. · Infectious disease: No fever, chills or frequent infections. · Endocrine: No polydipsia or polyuria. No temperature intolerance. · Neurologic: As above  · Allergic/Immunologic: No nasal congestion or hives. No repeated infections.        PHYSICAL EXAM:      BP (!) 128/59   Pulse 67   Temp 98.5 °F (36.9 °C) (Temporal)   Resp 18   Ht 6' 1\" (1.854 m)   Wt 232 lb 12.9 oz (105.6 kg)   SpO2 90%   BMI 30.71 kg/m²    Temp (24hrs), Av.7 °F (37.1 °C), Min:98.4 °F (36.9 °C), Max:99.2 °F (37.3 °C)      General appearance - not in pain or distress  Mental status - alert and oriented  Eyes - pupils equal and reactive, extraocular eye movements intact  Ears - bilateral TM's and external ear canals normal  Nose - normal and patent, no erythema, discharge or polyps  Mouth - mucous membranes moist, pharynx normal without lesions  Neck - supple, no significant adenopathy  Lymphatics - no palpable lymphadenopathy, no hepatosplenomegaly  Chest - clear to auscultation, no wheezes, rales or rhonchi, symmetric air entry  Heart - normal rate, regular rhythm, normal S1, S2, no murmurs, rubs, clicks or gallops  Abdomen - soft, nontender, nondistended, no masses or organomegaly  Neurological - alert, oriented, normal speech, no focal findings or movement disorder noted  Musculoskeletal - no joint tenderness, deformity or swelling  Extremities - peripheral pulses normal, no pedal edema, no clubbing or cyanosis  Skin - normal coloration and turgor, no rashes, no suspicious skin lesions noted           DATA:      Labs:       CBC:   Recent Labs     09/29/20  0630 09/30/20  0645 09/30/20  1505   WBC 7.4 7.3  --    HGB 7.3* 6.8* 7.3*   HCT 23.3* 21.4* 23.5*    260  --      BMP:   Recent Labs     09/29/20  0630 09/30/20  0645    138   K 3.6* 3.7   CO2 19* 20   BUN 39* 46*   CREATININE 2.52* 3.09*   LABGLOM 25* 20*   GLUCOSE 137* 148*     PT/INR:   Recent Labs     09/30/20 0645   PROTIME 13.4   INR 1.0     APTT:  Recent Labs     09/30/20 0645   APTT 37.9*     LIVER PROFILE:No results for input(s): AST, ALT, LABALBU in the last 72 hours. IMPRESSION:    Primary Problem  Altered mental status    Active Hospital Problems    Diagnosis Date Noted    Anemia [D64.9] 09/28/2020    Altered mental status [R41.82] 09/25/2020    LISANDRA (acute kidney injury) ([facility] [address] [N17.9] 09/25/2020    Sepsis without acute organ dysfunction ([facility] [A41.9] 09/24/2020    Somnolence [R40.0] 09/24/2020    Elevated troponin [R79.89] 09/24/2020    TIA (transient ischemic attack) [G45.9] 09/24/2020       RECOMMENDATIONS:  1. Records and labs and images were reviewed and discussed with the patient and family at bedside. 2. Obviously patient presents with acute CVA which has resolved. Labs showing severe anemia. This is multifactorial.  He has clear evidence of iron deficiency anemia and he has renal insufficiency. So this is likely anemia of chronic renal insufficiency as well as iron deficiency anemia. Further work-up was negative. Patient is receiving blood transfusion now. We will give him IV iron infusion. I recommended GI evaluation and colonoscopy but he declined again. He had past history of melena 1 year ago. 3. Labs will be reevaluated after correction of the iron.   If hemoglobin continues to be below 10 patient will benefit from outpatient treatment with

## 2020-10-01 NOTE — PLAN OF CARE
Problem: Pain:  Goal: Pain level will decrease  Description: Pain level will decrease  10/1/2020 0241 by Ignacio Becerra RN  Outcome: Ongoing  Note: Assess pain every 4 hours and prn using a 0-10 scale. Teach patient adverse complication of uncontrolled pain. Plan patients day so that aggravating activities coincide with peak of analgesic. Patients should be medicated before procedures and activities that incite pain to prevent spikes in pain. Provide patient with distractions of choice such as TV, music or reading. Discuss with patient what a tolerable pain rating would be and work towards that and not just eliminating all pain. Problem: Falls - Risk of:  Goal: Will remain free from falls  Description: Will remain free from falls  10/1/2020 0241 by Ignacio Becerra RN  Outcome: Ongoing  Note: Fall assessment completed daily. Bed in lowest position. Call light within reach. Falling star posted to outside of door. Fall risk sticker in place on ID band. Side rails up 2/4. Bed alarm on for safety. Patient does not ambulate at this time. Will continue to monitor. Problem: Falls - Risk of:  Goal: Absence of physical injury  Description: Absence of physical injury  10/1/2020 0241 by Ignacio Becerra RN  Outcome: Ongoing  Note: Patient free from physical injury at this time. Problem: Skin Integrity:  Goal: Will show no infection signs and symptoms  Description: Will show no infection signs and symptoms  10/1/2020 0241 by Ignacio Becerra RN  Outcome: Ongoing  Note: No sign of infection     Problem: Skin Integrity:  Goal: Absence of new skin breakdown  Description: Absence of new skin breakdown  10/1/2020 0241 by Ignacio Becerra RN  Outcome: Ongoing  Note: Skin assessment performed, no new breakdown noted at this time. Patient skin is dry and intact. Will continue to monitor for further redness or breakdown.        Problem: Confusion - Acute:  Goal: Absence of continued neurological deterioration signs and symptoms  Description: Absence of continued neurological deterioration signs and symptoms  10/1/2020 0241 by Ignacio Becerra RN  Outcome: Ongoing  Note: Patient has conversations with people who arent there. Patient becomes angry, non cooperative and combative at times when staff tries to reorient him. Will continue to monitor. Problem: Discharge Planning:  Goal: Ability to perform activities of daily living will improve  Description: Ability to perform activities of daily living will improve  10/1/2020 0241 by Ignacio Becerra RN  Outcome: Ongoing  Note: ADLs met with assistance at this time. Problem: Injury - Risk of, Physical Injury:  Goal: Will remain free from falls  Description: Will remain free from falls  10/1/2020 0241 by Ignacio Becerra RN  Outcome: Ongoing  Note: Fall assessment completed daily. Bed in lowest position. Call light within reach. Falling star posted to outside of door. Fall risk sticker in place on ID band. Side rails up 2/4. Bed alarm on for safety. Patient does not ambulate at this time. Will continue to monitor. Problem: Injury - Risk of, Physical Injury:  Goal: Absence of physical injury  Description: Absence of physical injury  10/1/2020 0241 by Ignacio Becerra RN  Outcome: Ongoing  Note: Patient free from physical injury at this time. Problem: Mood - Altered:  Goal: Mood stable  Description: Mood stable  10/1/2020 0241 by Ignacio Becerra RN  Outcome: Ongoing  Note: Patient mood unpredicable this shift. Was pleasantly confused at beginning of shift and has gradually progressed to angry and confused. Problem: Mood - Altered:  Goal: Absence of abusive behavior  Description: Absence of abusive behavior  10/1/2020 0241 by Ignacio Becerra RN  Outcome: Ongoing  Note: Patient attempts to kick or hit writer during attempts to change brief, or when writer tries to put him back into bed after he attempts to climb out.       Problem: Sensory Perception - Impaired:  Goal: Able to refrain from responding to false sensory perceptions  Description: Able to refrain from responding to false sensory perceptions  10/1/2020 0241 by Gavin Ahuja RN  Outcome: Ongoing  Note: Patient currently having conversations with someone who isnt in the room. Problem: Sensory Perception - Impaired:  Goal: Able to distinguish between reality-based and nonreality-based thinking  Description: Able to distinguish between reality-based and nonreality-based thinking  10/1/2020 0241 by Gavin Ahuja RN  Outcome: Ongoing  Note: Patient continues to deny reality when writer re-orients him to the hospital setting. Problem: Sleep Pattern Disturbance:  Goal: Appears well-rested  Description: Appears well-rested  10/1/2020 0241 by Gavin Ahuja RN  Outcome: Ongoing  Note: Patient has not slept so far this shift.  Will continue to encourage rest. Attempted to turn tv volume down and patient became angry

## 2020-10-01 NOTE — PROGRESS NOTES
[facility]  Inpatient/Observation/Outpatient Rehabilitation    Date: 10/1/2020  Patient Name: Adrienne Mak       [x] Inpatient Acute/Observation       []  Outpatient  : 1945     Pt not seen this PM d/t pt discharged per SEBLE Carpenter, NP-C's note/orders.        [place], [place] Date: 10/1/2020

## 2020-10-01 NOTE — PROGRESS NOTES
[facility] SPECIALTY [facility]  OCCUPATIONAL THERAPY  No Visit Note    [] ICU    [x] Acute   Patient: Brando Mejia  Room: 2153/4349-07      Brando Mejia not seen on 10/1/2020 at 4:02 PM. OT treatment attempted however pt is discharged.           Signature: Chloé Valero, MOT, OTR/L

## 2020-10-02 PROBLEM — D50.9 IRON DEFICIENCY ANEMIA: Status: ACTIVE | Noted: 2020-01-01

## 2024-02-01 NOTE — FLOWSHEET NOTE
Sleeping , woke up for lunch. Attempted to put head of bed up for patient to eat, yells at nurse to put [id number] Food tray sat up. Encouraged to try to eat lunch.  Continue to monitor 74 year old female with PMH A fib on Plavix and coumadin presents with melena and exertional lightheadedness  x 3 days with known prior GI ulcer clinical presentation consistent with likely upper GI bleed

## (undated) DEVICE — PAIN TRAY: Brand: MEDLINE INDUSTRIES, INC.

## (undated) DEVICE — AVANOS* UNIVERSAL BLOCK TRAYS: Brand: AVANOS